# Patient Record
Sex: MALE | Race: WHITE | NOT HISPANIC OR LATINO | Employment: OTHER | URBAN - METROPOLITAN AREA
[De-identification: names, ages, dates, MRNs, and addresses within clinical notes are randomized per-mention and may not be internally consistent; named-entity substitution may affect disease eponyms.]

---

## 2023-08-12 ENCOUNTER — HOSPITAL ENCOUNTER (EMERGENCY)
Facility: HOSPITAL | Age: 68
Discharge: HOME | End: 2023-08-12
Attending: EMERGENCY MEDICINE
Payer: OTHER GOVERNMENT

## 2023-08-12 VITALS
RESPIRATION RATE: 16 BRPM | WEIGHT: 120.59 LBS | DIASTOLIC BLOOD PRESSURE: 94 MMHG | HEIGHT: 73 IN | SYSTOLIC BLOOD PRESSURE: 110 MMHG | BODY MASS INDEX: 15.98 KG/M2 | HEART RATE: 79 BPM | TEMPERATURE: 97.9 F | OXYGEN SATURATION: 95 %

## 2023-08-12 DIAGNOSIS — B00.89 HERPES DERMATITIS: Primary | ICD-10-CM

## 2023-08-12 PROCEDURE — 99282 EMERGENCY DEPT VISIT SF MDM: CPT

## 2023-08-12 RX ORDER — ACYCLOVIR 400 MG/1
400 TABLET ORAL
Qty: 50 TABLET | Refills: 0 | Status: SHIPPED | OUTPATIENT
Start: 2023-08-12

## 2023-08-12 NOTE — ED PROVIDER NOTES
Time: 10:19 AM EDT  Date of encounter:  8/12/2023  Independent Historian/Clinical History and Information was obtained by:   Patient    History is limited by: N/A    Chief Complaint: Skin rash      History of Present Illness:  Patient is a 67 y.o. year old male who presents to the emergency department for evaluation of rash to the mid back since early May.  He states initially was burning and now is itching.  He has previously reportedly been prescribed steroid cream which he states made it worse.  Is been no fever.    HPI    Patient Care Team  Primary Care Provider: Antony Florence MD    Past Medical History:     No Known Allergies  Past Medical History:   Diagnosis Date    Arthritis     COPD (chronic obstructive pulmonary disease)     Hypertension      History reviewed. No pertinent surgical history.  History reviewed. No pertinent family history.    Home Medications:  Prior to Admission medications    Medication Sig Start Date End Date Taking? Authorizing Provider   acyclovir (ZOVIRAX) 400 MG tablet Take 1 tablet by mouth 5 (Five) Times a Day. Take no more than 5 doses a day. 8/12/23   Dennis Woods MD   albuterol sulfate  (90 Base) MCG/ACT inhaler Inhale 2 puffs Every 4 (Four) Hours As Needed for Wheezing.    Provider, MD Pricila   amLODIPine (NORVASC) 5 MG tablet Take 1 tablet by mouth Daily.    ProviderPricila MD   diphenhydrAMINE (BENADRYL) 2 % cream Apply 1 application  topically to the appropriate area as directed 3 (Three) Times a Day As Needed for Itching. 8/12/23   Dennis Woods MD   gabapentin (NEURONTIN) 600 MG tablet Take 1 tablet by mouth 3 (Three) Times a Day.    ProviderPricila MD   methylPREDNISolone (MEDROL) 4 MG dose pack Take as directed on package instructions. 6/9/23   Cooksey, John Calvin, PA-C        Social History:   Social History     Tobacco Use    Smoking status: Every Day     Packs/day: 0.50     Years: 50.00     Pack years: 25.00     Types: Cigarettes     "Smokeless tobacco: Never   Vaping Use    Vaping Use: Never used   Substance Use Topics    Alcohol use: Never    Drug use: Never         Review of Systems:  Review of Systems   Constitutional:  Negative for chills and fever.   HENT:  Negative for congestion, ear pain and sore throat.    Eyes:  Negative for pain.   Respiratory:  Negative for cough, chest tightness and shortness of breath.    Cardiovascular:  Negative for chest pain.   Gastrointestinal:  Negative for abdominal pain, diarrhea, nausea and vomiting.   Genitourinary:  Negative for flank pain and hematuria.   Musculoskeletal:  Negative for joint swelling.   Skin:  Negative for pallor.   Neurological:  Negative for seizures and headaches.   All other systems reviewed and are negative.     Physical Exam:  BP (!) 130/108   Pulse 90   Temp 97.9 øF (36.6 øC) (Oral)   Resp 16   Ht 185.4 cm (73\")   Wt 54.7 kg (120 lb 9.5 oz)   SpO2 94%   BMI 15.91 kg/mý     Physical Exam  Constitutional:       Appearance: Normal appearance.   HENT:      Head: Normocephalic and atraumatic.      Nose: Nose normal.      Mouth/Throat:      Mouth: Mucous membranes are moist.   Eyes:      Extraocular Movements: Extraocular movements intact.      Conjunctiva/sclera: Conjunctivae normal.      Pupils: Pupils are equal, round, and reactive to light.   Cardiovascular:      Rate and Rhythm: Normal rate and regular rhythm.      Pulses: Normal pulses.      Heart sounds: Normal heart sounds.   Pulmonary:      Effort: Pulmonary effort is normal.      Breath sounds: Normal breath sounds. No wheezing.   Abdominal:      Palpations: Abdomen is soft.      Tenderness: There is no abdominal tenderness.   Musculoskeletal:         General: Normal range of motion.      Cervical back: Normal range of motion and neck supple.      Right lower leg: No edema.      Left lower leg: No edema.   Skin:     General: Skin is warm and dry.      Capillary Refill: Capillary refill takes less than 2 seconds.      " Findings: No rash.      Comments: To the mid thoracic region there is a 10 cm vertical by 15 cm horizontal area of rash.  This has a dark brawny discoloration with scattered raised sandpaper type lesions that could represent resolving vesicles.  There is no tenderness or erythema or fluctuance.   Neurological:      General: No focal deficit present.      Mental Status: He is alert and oriented to person, place, and time. Mental status is at baseline.      Cranial Nerves: No cranial nerve deficit.      Sensory: No sensory deficit.      Motor: No weakness.   Psychiatric:         Mood and Affect: Mood normal.         Behavior: Behavior normal.                Procedures:  Procedures      Medical Decision Making:      Comorbidities that affect care:        External Notes reviewed:          The following orders were placed and all results were independently analyzed by me:  No orders of the defined types were placed in this encounter.      Medications Given in the Emergency Department:  Medications - No data to display     ED Course:         Labs:    Lab Results (last 24 hours)       ** No results found for the last 24 hours. **             Imaging:    No Radiology Exams Resulted Within Past 24 Hours      Differential Diagnosis and Discussion:    Rash: Differential diagnosis includes but is not limited to sepsis, cellulitis, [place] Spotted Fever, meningitis, meningococcemia, Varicella, Strep infection, dermatitis, allergic reaction, Lyme disease, and toxic shock syndrome.        MDM  Number of Diagnoses or Management Options  Herpes dermatitis  Diagnosis management comments: Looking at the patient's rash and appreciating that steroids may have made it worse I am concerned that this may represent a herpetic resolving rash.  For that reason I will place him on acyclovir and he is prescribed Benadryl for the itching.  He does have a dermatology appointment 2 weeks and he strongly encouraged to keep that appointment.   I would not prescribe him steroids as he states those previously made it worse which again heightens my concern this may be herpetic in nature.  There is been no fever to suggest sepsis or other acute medical emergent issues at this time.             Patient Care Considerations:          Consultants/Shared Management Plan:    None    Social Determinants of Health:    Patient is independent, reliable, and has access to care.       Disposition and Care Coordination:    Discharged: The patient is suitable and stable for discharge with no need for consideration of observation or admission.    I have explained discharge medications and the need for follow up with the patient/caretakers. This was also printed in the discharge instructions. Patient was discharged with the following medications and follow up:      Medication List        New Prescriptions      acyclovir 400 MG tablet  Commonly known as: ZOVIRAX  Take 1 tablet by mouth 5 (Five) Times a Day. Take no more than 5 doses a day.     diphenhydrAMINE 2 % cream  Commonly known as: BENADRYL  Apply 1 application  topically to the appropriate area as directed 3 (Three) Times a Day As Needed for Itching.               Where to Get Your Medications        These medications were sent to [facility] [address] [place] [address], [address] - [phone]  - [phone]  [address] [facility] [address], [facility] [address]      Phone: [phone]   acyclovir 400 MG tablet  diphenhydrAMINE 2 % cream      Follow-up with the dermatologist as scheduled in 2 weeks.             Final diagnoses:   Herpes dermatitis        ED Disposition       ED Disposition   Discharge    Condition   Stable    Comment   --               This medical record created using voice recognition software.             Dennis Woods MD  08/12/23 [id number]

## 2023-11-27 PROCEDURE — 82948 REAGENT STRIP/BLOOD GLUCOSE: CPT

## 2023-12-25 ENCOUNTER — APPOINTMENT (OUTPATIENT)
Dept: GENERAL RADIOLOGY | Facility: HOSPITAL | Age: 68
End: 2023-12-25
Payer: OTHER GOVERNMENT

## 2023-12-25 ENCOUNTER — HOSPITAL ENCOUNTER (INPATIENT)
Facility: HOSPITAL | Age: 68
LOS: 4 days | Discharge: HOME | End: 2023-12-29
Attending: EMERGENCY MEDICINE | Admitting: FAMILY MEDICINE
Payer: OTHER GOVERNMENT

## 2023-12-25 DIAGNOSIS — R07.9 CHEST PAIN, UNSPECIFIED TYPE: Primary | ICD-10-CM

## 2023-12-25 DIAGNOSIS — I21.3 ST ELEVATION MYOCARDIAL INFARCTION (STEMI), UNSPECIFIED ARTERY: ICD-10-CM

## 2023-12-25 DIAGNOSIS — I21.21 ST ELEVATION MYOCARDIAL INFARCTION INVOLVING LEFT CIRCUMFLEX CORONARY ARTERY: ICD-10-CM

## 2023-12-25 DIAGNOSIS — E87.1 HYPONATREMIA: ICD-10-CM

## 2023-12-25 LAB
ACT BLD: 206 SECONDS (ref 89–137)
ACT BLD: 260 SECONDS (ref 89–137)
ACT BLD: 271 SECONDS (ref 89–137)
ALBUMIN SERPL-MCNC: 3.5 G/DL (ref 3.5–5.2)
ALBUMIN/GLOB SERPL: 0.9 G/DL
ALP SERPL-CCNC: 89 U/L (ref 39–117)
ALT SERPL W P-5'-P-CCNC: 10 U/L (ref 1–41)
ANION GAP SERPL CALCULATED.3IONS-SCNC: 13.3 MMOL/L (ref 5–15)
AST SERPL-CCNC: 29 U/L (ref 1–40)
BASOPHILS # BLD AUTO: 0.03 10*3/MM3 (ref 0–0.2)
BASOPHILS NFR BLD AUTO: 0.6 % (ref 0–1.5)
BILIRUB SERPL-MCNC: 0.3 MG/DL (ref 0–1.2)
BUN SERPL-MCNC: 7 MG/DL (ref 8–23)
BUN/CREAT SERPL: 11.5 (ref 7–25)
CALCIUM SPEC-SCNC: 9 MG/DL (ref 8.6–10.5)
CHLORIDE SERPL-SCNC: 89 MMOL/L (ref 98–107)
CHOLEST SERPL-MCNC: 116 MG/DL (ref 0–200)
CO2 SERPL-SCNC: 19.7 MMOL/L (ref 22–29)
CREAT SERPL-MCNC: 0.61 MG/DL (ref 0.76–1.27)
DEPRECATED RDW RBC AUTO: 44.2 FL (ref 37–54)
EGFRCR SERPLBLD CKD-EPI 2021: 105.9 ML/MIN/1.73
EOSINOPHIL # BLD AUTO: 0.2 10*3/MM3 (ref 0–0.4)
EOSINOPHIL NFR BLD AUTO: 3.9 % (ref 0.3–6.2)
ERYTHROCYTE [DISTWIDTH] IN BLOOD BY AUTOMATED COUNT: 13.2 % (ref 12.3–15.4)
GEN 5 2HR TROPONIN T REFLEX: 11 NG/L
GLOBULIN UR ELPH-MCNC: 3.7 GM/DL
GLUCOSE SERPL-MCNC: 100 MG/DL (ref 65–99)
HCT VFR BLD AUTO: 38.4 % (ref 37.5–51)
HDLC SERPL-MCNC: 32 MG/DL (ref 40–60)
HGB BLD-MCNC: 13.3 G/DL (ref 13–17.7)
HOLD SPECIMEN: NORMAL
HOLD SPECIMEN: NORMAL
IMM GRANULOCYTES # BLD AUTO: 0.01 10*3/MM3 (ref 0–0.05)
IMM GRANULOCYTES NFR BLD AUTO: 0.2 % (ref 0–0.5)
LDLC SERPL CALC-MCNC: 70 MG/DL (ref 0–100)
LDLC/HDLC SERPL: 2.2 {RATIO}
LIPASE SERPL-CCNC: 22 U/L (ref 13–60)
LYMPHOCYTES # BLD AUTO: 0.9 10*3/MM3 (ref 0.7–3.1)
LYMPHOCYTES NFR BLD AUTO: 17.4 % (ref 19.6–45.3)
MAGNESIUM SERPL-MCNC: 1.7 MG/DL (ref 1.6–2.4)
MCH RBC QN AUTO: 31.7 PG (ref 26.6–33)
MCHC RBC AUTO-ENTMCNC: 34.6 G/DL (ref 31.5–35.7)
MCV RBC AUTO: 91.4 FL (ref 79–97)
MONOCYTES # BLD AUTO: 0.85 10*3/MM3 (ref 0.1–0.9)
MONOCYTES NFR BLD AUTO: 16.4 % (ref 5–12)
NEUTROPHILS NFR BLD AUTO: 3.18 10*3/MM3 (ref 1.7–7)
NEUTROPHILS NFR BLD AUTO: 61.5 % (ref 42.7–76)
NRBC BLD AUTO-RTO: 0 /100 WBC (ref 0–0.2)
NT-PROBNP SERPL-MCNC: 285.8 PG/ML (ref 0–900)
PLATELET # BLD AUTO: 321 10*3/MM3 (ref 140–450)
PMV BLD AUTO: 8.2 FL (ref 6–12)
POTASSIUM SERPL-SCNC: 4.3 MMOL/L (ref 3.5–5.2)
PROCALCITONIN SERPL-MCNC: 2.44 NG/ML (ref 0–0.25)
PROT SERPL-MCNC: 7.2 G/DL (ref 6–8.5)
RBC # BLD AUTO: 4.2 10*6/MM3 (ref 4.14–5.8)
SODIUM SERPL-SCNC: 122 MMOL/L (ref 136–145)
TRIGL SERPL-MCNC: 68 MG/DL (ref 0–150)
TROPONIN T DELTA: 0 NG/L
TROPONIN T SERPL HS-MCNC: 11 NG/L
VLDLC SERPL-MCNC: 14 MG/DL (ref 5–40)
WBC NRBC COR # BLD AUTO: 5.17 10*3/MM3 (ref 3.4–10.8)
WHOLE BLOOD HOLD COAG: NORMAL
WHOLE BLOOD HOLD SPECIMEN: NORMAL

## 2023-12-25 PROCEDURE — 80061 LIPID PANEL: CPT | Performed by: INTERNAL MEDICINE

## 2023-12-25 PROCEDURE — 92978 ENDOLUMINL IVUS OCT C 1ST: CPT | Performed by: INTERNAL MEDICINE

## 2023-12-25 PROCEDURE — 25010000002 MORPHINE PER 10 MG: Performed by: EMERGENCY MEDICINE

## 2023-12-25 PROCEDURE — 92979 ENDOLUMINL IVUS OCT C EA: CPT | Performed by: INTERNAL MEDICINE

## 2023-12-25 PROCEDURE — 71045 X-RAY EXAM CHEST 1 VIEW: CPT

## 2023-12-25 PROCEDURE — 027034Z DILATION OF CORONARY ARTERY, ONE ARTERY WITH DRUG-ELUTING INTRALUMINAL DEVICE, PERCUTANEOUS APPROACH: ICD-10-PCS | Performed by: INTERNAL MEDICINE

## 2023-12-25 PROCEDURE — C1725 CATH, TRANSLUMIN NON-LASER: HCPCS | Performed by: INTERNAL MEDICINE

## 2023-12-25 PROCEDURE — 99291 CRITICAL CARE FIRST HOUR: CPT

## 2023-12-25 PROCEDURE — 99152 MOD SED SAME PHYS/QHP 5/>YRS: CPT | Performed by: INTERNAL MEDICINE

## 2023-12-25 PROCEDURE — 4A023N7 MEASUREMENT OF CARDIAC SAMPLING AND PRESSURE, LEFT HEART, PERCUTANEOUS APPROACH: ICD-10-PCS | Performed by: INTERNAL MEDICINE

## 2023-12-25 PROCEDURE — 83036 HEMOGLOBIN GLYCOSYLATED A1C: CPT | Performed by: INTERNAL MEDICINE

## 2023-12-25 PROCEDURE — 80053 COMPREHEN METABOLIC PANEL: CPT | Performed by: EMERGENCY MEDICINE

## 2023-12-25 PROCEDURE — 25510000001 IOPAMIDOL PER 1 ML: Performed by: INTERNAL MEDICINE

## 2023-12-25 PROCEDURE — 25010000002 HEPARIN (PORCINE) PER 1000 UNITS: Performed by: INTERNAL MEDICINE

## 2023-12-25 PROCEDURE — 25810000003 SODIUM CHLORIDE 0.9 % SOLUTION: Performed by: EMERGENCY MEDICINE

## 2023-12-25 PROCEDURE — C1753 CATH, INTRAVAS ULTRASOUND: HCPCS | Performed by: INTERNAL MEDICINE

## 2023-12-25 PROCEDURE — 25010000002 MIDAZOLAM PER 1MG: Performed by: INTERNAL MEDICINE

## 2023-12-25 PROCEDURE — 85347 COAGULATION TIME ACTIVATED: CPT

## 2023-12-25 PROCEDURE — B2111ZZ FLUOROSCOPY OF MULTIPLE CORONARY ARTERIES USING LOW OSMOLAR CONTRAST: ICD-10-PCS | Performed by: INTERNAL MEDICINE

## 2023-12-25 PROCEDURE — 83930 ASSAY OF BLOOD OSMOLALITY: CPT | Performed by: STUDENT IN AN ORGANIZED HEALTH CARE EDUCATION/TRAINING PROGRAM

## 2023-12-25 PROCEDURE — 99153 MOD SED SAME PHYS/QHP EA: CPT | Performed by: INTERNAL MEDICINE

## 2023-12-25 PROCEDURE — 84484 ASSAY OF TROPONIN QUANT: CPT | Performed by: EMERGENCY MEDICINE

## 2023-12-25 PROCEDURE — 93005 ELECTROCARDIOGRAM TRACING: CPT | Performed by: EMERGENCY MEDICINE

## 2023-12-25 PROCEDURE — C1887 CATHETER, GUIDING: HCPCS | Performed by: INTERNAL MEDICINE

## 2023-12-25 PROCEDURE — 93458 L HRT ARTERY/VENTRICLE ANGIO: CPT | Performed by: INTERNAL MEDICINE

## 2023-12-25 PROCEDURE — C1874 STENT, COATED/COV W/DEL SYS: HCPCS | Performed by: INTERNAL MEDICINE

## 2023-12-25 PROCEDURE — C1769 GUIDE WIRE: HCPCS | Performed by: INTERNAL MEDICINE

## 2023-12-25 PROCEDURE — C9606 PERC D-E COR REVASC W AMI S: HCPCS | Performed by: INTERNAL MEDICINE

## 2023-12-25 PROCEDURE — 83735 ASSAY OF MAGNESIUM: CPT | Performed by: EMERGENCY MEDICINE

## 2023-12-25 PROCEDURE — 83690 ASSAY OF LIPASE: CPT | Performed by: EMERGENCY MEDICINE

## 2023-12-25 PROCEDURE — 84484 ASSAY OF TROPONIN QUANT: CPT | Performed by: INTERNAL MEDICINE

## 2023-12-25 PROCEDURE — 25010000002 FENTANYL CITRATE (PF) 50 MCG/ML SOLUTION: Performed by: INTERNAL MEDICINE

## 2023-12-25 PROCEDURE — C1894 INTRO/SHEATH, NON-LASER: HCPCS | Performed by: INTERNAL MEDICINE

## 2023-12-25 PROCEDURE — 83880 ASSAY OF NATRIURETIC PEPTIDE: CPT | Performed by: EMERGENCY MEDICINE

## 2023-12-25 PROCEDURE — 25010000002 ONDANSETRON PER 1 MG: Performed by: EMERGENCY MEDICINE

## 2023-12-25 PROCEDURE — C9600 PERC DRUG-EL COR STENT SING: HCPCS | Performed by: INTERNAL MEDICINE

## 2023-12-25 PROCEDURE — B240ZZ3 ULTRASONOGRAPHY OF SINGLE CORONARY ARTERY, INTRAVASCULAR: ICD-10-PCS | Performed by: INTERNAL MEDICINE

## 2023-12-25 PROCEDURE — 93005 ELECTROCARDIOGRAM TRACING: CPT

## 2023-12-25 PROCEDURE — 85025 COMPLETE CBC W/AUTO DIFF WBC: CPT | Performed by: EMERGENCY MEDICINE

## 2023-12-25 PROCEDURE — 99223 1ST HOSP IP/OBS HIGH 75: CPT | Performed by: STUDENT IN AN ORGANIZED HEALTH CARE EDUCATION/TRAINING PROGRAM

## 2023-12-25 PROCEDURE — 84145 PROCALCITONIN (PCT): CPT | Performed by: STUDENT IN AN ORGANIZED HEALTH CARE EDUCATION/TRAINING PROGRAM

## 2023-12-25 PROCEDURE — 99255 IP/OBS CONSLTJ NEW/EST HI 80: CPT | Performed by: INTERNAL MEDICINE

## 2023-12-25 DEVICE — XIENCE SKYPOINT™ EVEROLIMUS ELUTING CORONARY STENT SYSTEM 3.00 MM X 15 MM / RAPID-EXCHANGE
Type: IMPLANTABLE DEVICE | Status: FUNCTIONAL
Brand: XIENCE SKYPOINT™

## 2023-12-25 DEVICE — XIENCE SKYPOINT™ EVEROLIMUS ELUTING CORONARY STENT SYSTEM 3.25 MM X 33 MM / RAPID-EXCHANGE
Type: IMPLANTABLE DEVICE | Status: FUNCTIONAL
Brand: XIENCE SKYPOINT™

## 2023-12-25 RX ORDER — FENTANYL CITRATE 50 UG/ML
INJECTION, SOLUTION INTRAMUSCULAR; INTRAVENOUS
Status: DISCONTINUED | OUTPATIENT
Start: 2023-12-25 | End: 2023-12-25 | Stop reason: HOSPADM

## 2023-12-25 RX ORDER — ASPIRIN 81 MG/1
324 TABLET, CHEWABLE ORAL ONCE
Status: DISCONTINUED | OUTPATIENT
Start: 2023-12-25 | End: 2023-12-25

## 2023-12-25 RX ORDER — SODIUM CHLORIDE 0.9 % (FLUSH) 0.9 %
10 SYRINGE (ML) INJECTION AS NEEDED
Status: DISCONTINUED | OUTPATIENT
Start: 2023-12-25 | End: 2023-12-29 | Stop reason: HOSPADM

## 2023-12-25 RX ORDER — HYDROCODONE BITARTRATE AND ACETAMINOPHEN 5; 325 MG/1; MG/1
1 TABLET ORAL EVERY 4 HOURS PRN
Status: DISCONTINUED | OUTPATIENT
Start: 2023-12-25 | End: 2023-12-29 | Stop reason: HOSPADM

## 2023-12-25 RX ORDER — ONDANSETRON 2 MG/ML
4 INJECTION INTRAMUSCULAR; INTRAVENOUS EVERY 6 HOURS PRN
Status: DISCONTINUED | OUTPATIENT
Start: 2023-12-25 | End: 2023-12-29 | Stop reason: HOSPADM

## 2023-12-25 RX ORDER — MIDAZOLAM HYDROCHLORIDE 2 MG/2ML
INJECTION, SOLUTION INTRAMUSCULAR; INTRAVENOUS
Status: DISCONTINUED | OUTPATIENT
Start: 2023-12-25 | End: 2023-12-25 | Stop reason: HOSPADM

## 2023-12-25 RX ORDER — ATORVASTATIN CALCIUM 40 MG/1
40 TABLET, FILM COATED ORAL NIGHTLY
Status: DISCONTINUED | OUTPATIENT
Start: 2023-12-25 | End: 2023-12-29 | Stop reason: HOSPADM

## 2023-12-25 RX ORDER — ONDANSETRON 2 MG/ML
4 INJECTION INTRAMUSCULAR; INTRAVENOUS ONCE
Status: COMPLETED | OUTPATIENT
Start: 2023-12-25 | End: 2023-12-25

## 2023-12-25 RX ORDER — LISINOPRIL 5 MG/1
5 TABLET ORAL DAILY
Status: ON HOLD | COMMUNITY
End: 2023-12-28 | Stop reason: SDUPTHER

## 2023-12-25 RX ORDER — IPRATROPIUM BROMIDE AND ALBUTEROL SULFATE 2.5; .5 MG/3ML; MG/3ML
3 SOLUTION RESPIRATORY (INHALATION)
Status: DISCONTINUED | OUTPATIENT
Start: 2023-12-26 | End: 2023-12-26

## 2023-12-25 RX ORDER — METOPROLOL SUCCINATE 25 MG/1
25 TABLET, EXTENDED RELEASE ORAL
Status: DISCONTINUED | OUTPATIENT
Start: 2023-12-26 | End: 2023-12-29 | Stop reason: HOSPADM

## 2023-12-25 RX ORDER — ASPIRIN 81 MG/1
81 TABLET ORAL DAILY
Status: DISCONTINUED | OUTPATIENT
Start: 2023-12-26 | End: 2023-12-29 | Stop reason: HOSPADM

## 2023-12-25 RX ORDER — NITROGLYCERIN 0.4 MG/1
0.4 TABLET SUBLINGUAL
Status: DISCONTINUED | OUTPATIENT
Start: 2023-12-25 | End: 2023-12-29 | Stop reason: HOSPADM

## 2023-12-25 RX ORDER — AMLODIPINE BESYLATE 5 MG/1
10 TABLET ORAL DAILY
Status: DISCONTINUED | OUTPATIENT
Start: 2023-12-26 | End: 2023-12-29 | Stop reason: HOSPADM

## 2023-12-25 RX ORDER — SODIUM CHLORIDE 9 MG/ML
500 INJECTION, SOLUTION INTRAVENOUS ONCE
Status: COMPLETED | OUTPATIENT
Start: 2023-12-26 | End: 2023-12-26

## 2023-12-25 RX ORDER — ACETAMINOPHEN 325 MG/1
650 TABLET ORAL EVERY 4 HOURS PRN
Status: DISCONTINUED | OUTPATIENT
Start: 2023-12-25 | End: 2023-12-29 | Stop reason: HOSPADM

## 2023-12-25 RX ORDER — LIDOCAINE HYDROCHLORIDE 20 MG/ML
INJECTION, SOLUTION INFILTRATION; PERINEURAL
Status: DISCONTINUED | OUTPATIENT
Start: 2023-12-25 | End: 2023-12-25 | Stop reason: HOSPADM

## 2023-12-25 RX ORDER — HEPARIN SODIUM 1000 [USP'U]/ML
INJECTION, SOLUTION INTRAVENOUS; SUBCUTANEOUS
Status: DISCONTINUED | OUTPATIENT
Start: 2023-12-25 | End: 2023-12-25 | Stop reason: HOSPADM

## 2023-12-25 RX ORDER — ONDANSETRON 4 MG/1
4 TABLET, ORALLY DISINTEGRATING ORAL EVERY 6 HOURS PRN
Status: DISCONTINUED | OUTPATIENT
Start: 2023-12-25 | End: 2023-12-29 | Stop reason: HOSPADM

## 2023-12-25 RX ORDER — CEFTRIAXONE SODIUM 1 G/50ML
1000 INJECTION, SOLUTION INTRAVENOUS EVERY 24 HOURS
Status: DISCONTINUED | OUTPATIENT
Start: 2023-12-26 | End: 2023-12-27

## 2023-12-25 RX ORDER — SODIUM CHLORIDE 9 MG/ML
250 INJECTION, SOLUTION INTRAVENOUS ONCE AS NEEDED
Status: DISCONTINUED | OUTPATIENT
Start: 2023-12-25 | End: 2023-12-29 | Stop reason: HOSPADM

## 2023-12-25 RX ORDER — DIPHENHYDRAMINE HCL 25 MG
25 CAPSULE ORAL EVERY 6 HOURS PRN
Status: DISCONTINUED | OUTPATIENT
Start: 2023-12-25 | End: 2023-12-29 | Stop reason: HOSPADM

## 2023-12-25 RX ORDER — MORPHINE SULFATE 2 MG/ML
2 INJECTION, SOLUTION INTRAMUSCULAR; INTRAVENOUS ONCE
Status: COMPLETED | OUTPATIENT
Start: 2023-12-25 | End: 2023-12-25

## 2023-12-25 RX ADMIN — MORPHINE SULFATE 2 MG: 2 INJECTION, SOLUTION INTRAMUSCULAR; INTRAVENOUS at 19:42

## 2023-12-25 RX ADMIN — ATORVASTATIN CALCIUM 40 MG: 40 TABLET, FILM COATED ORAL at 22:04

## 2023-12-25 RX ADMIN — HYDROCODONE BITARTRATE AND ACETAMINOPHEN 1 TABLET: 5; 325 TABLET ORAL at 22:08

## 2023-12-25 RX ADMIN — AMLODIPINE BESYLATE 10 MG: 10 TABLET ORAL at 23:51

## 2023-12-25 RX ADMIN — NITROGLYCERIN 0.4 MG: 0.4 TABLET SUBLINGUAL at 19:44

## 2023-12-25 RX ADMIN — ONDANSETRON 4 MG: 2 INJECTION INTRAMUSCULAR; INTRAVENOUS at 19:41

## 2023-12-25 RX ADMIN — SODIUM CHLORIDE 500 ML: 9 INJECTION, SOLUTION INTRAVENOUS at 19:40

## 2023-12-25 NOTE — Clinical Note
First balloon inflation max pressure = 14 lisa. First balloon inflation duration = 10 seconds. Second inflation of balloon - Max pressure = 10 lisa. 2nd Inflation of balloon - Duration = 10 seconds.

## 2023-12-25 NOTE — Clinical Note
First balloon inflation max pressure = 12 lisa. First balloon inflation duration = 10 seconds. Second inflation of balloon - Max pressure = 14 lisa. 2nd Inflation of balloon - Duration = 10 seconds. Third inflation of balloon - Max pressure = 12 lisa. 3rd Inflation of balloon - Duration = 10 seconds.

## 2023-12-25 NOTE — Clinical Note
First balloon inflation max pressure = 12 lisa. First balloon inflation duration = 10 seconds. Second inflation of balloon - Max pressure = 4 lisa. 2nd Inflation of balloon - Duration = 10 seconds.

## 2023-12-25 NOTE — Clinical Note
First balloon inflation max pressure = 8 lisa. First balloon inflation duration = 10 seconds. Second inflation of balloon - Max pressure = 8 lisa. 2nd Inflation of balloon - Duration = 10 seconds. Third inflation of balloon - Max pressure = 8 lisa. 3rd Inflation of balloon - Duration = 10 seconds.

## 2023-12-26 ENCOUNTER — APPOINTMENT (OUTPATIENT)
Dept: CARDIOLOGY | Facility: HOSPITAL | Age: 68
End: 2023-12-26
Payer: OTHER GOVERNMENT

## 2023-12-26 ENCOUNTER — APPOINTMENT (OUTPATIENT)
Dept: CT IMAGING | Facility: HOSPITAL | Age: 68
End: 2023-12-26
Payer: OTHER GOVERNMENT

## 2023-12-26 PROBLEM — E43 SEVERE MALNUTRITION: Status: ACTIVE | Noted: 2023-12-26

## 2023-12-26 LAB
ACT BLD: 141 SECONDS (ref 89–137)
ACT BLD: 152 SECONDS (ref 89–137)
ACT BLD: 158 SECONDS (ref 89–137)
ACT BLD: 158 SECONDS (ref 89–137)
ACT BLD: 185 SECONDS (ref 89–137)
ALBUMIN SERPL-MCNC: 3.5 G/DL (ref 3.5–5.2)
ALBUMIN/GLOB SERPL: 1 G/DL
ALP SERPL-CCNC: 88 U/L (ref 39–117)
ALT SERPL W P-5'-P-CCNC: 10 U/L (ref 1–41)
ANION GAP SERPL CALCULATED.3IONS-SCNC: 10.4 MMOL/L (ref 5–15)
ANION GAP SERPL CALCULATED.3IONS-SCNC: 12.2 MMOL/L (ref 5–15)
AST SERPL-CCNC: 26 U/L (ref 1–40)
BACTERIA BLD CULT: ABNORMAL
BACTERIA SPEC RESP CULT: NORMAL
BASOPHILS # BLD AUTO: 0.02 10*3/MM3 (ref 0–0.2)
BASOPHILS NFR BLD AUTO: 0.4 % (ref 0–1.5)
BH CV ECHO MEAS - AO ROOT DIAM: 3.2 CM
BH CV ECHO MEAS - EDV(CUBED): 91.1 ML
BH CV ECHO MEAS - EDV(MOD-SP2): 79.3 ML
BH CV ECHO MEAS - EDV(MOD-SP4): 65.8 ML
BH CV ECHO MEAS - EF(MOD-BP): 59.4 %
BH CV ECHO MEAS - EF(MOD-SP2): 63.3 %
BH CV ECHO MEAS - EF(MOD-SP4): 57.3 %
BH CV ECHO MEAS - ESV(CUBED): 19.9 ML
BH CV ECHO MEAS - ESV(MOD-SP2): 29.1 ML
BH CV ECHO MEAS - ESV(MOD-SP4): 28.1 ML
BH CV ECHO MEAS - FS: 39.8 %
BH CV ECHO MEAS - IVS/LVPW: 1.04 CM
BH CV ECHO MEAS - IVSD: 1.07 CM
BH CV ECHO MEAS - LA DIMENSION: 2.7 CM
BH CV ECHO MEAS - LAT PEAK E' VEL: 4.9 CM/SEC
BH CV ECHO MEAS - LV MASS(C)D: 164 GRAMS
BH CV ECHO MEAS - LVIDD: 4.5 CM
BH CV ECHO MEAS - LVIDS: 2.7 CM
BH CV ECHO MEAS - LVOT AREA: 3.1 CM2
BH CV ECHO MEAS - LVOT DIAM: 2 CM
BH CV ECHO MEAS - LVPWD: 1.03 CM
BH CV ECHO MEAS - MED PEAK E' VEL: 7 CM/SEC
BH CV ECHO MEAS - MV A MAX VEL: 64.9 CM/SEC
BH CV ECHO MEAS - MV DEC SLOPE: 509 CM/SEC2
BH CV ECHO MEAS - MV DEC TIME: 0.14 SEC
BH CV ECHO MEAS - MV E MAX VEL: 73.5 CM/SEC
BH CV ECHO MEAS - MV E/A: 1.13
BH CV ECHO MEAS - SV(MOD-SP2): 50.2 ML
BH CV ECHO MEAS - SV(MOD-SP4): 37.7 ML
BH CV ECHO MEAS - TAPSE (>1.6): 2.7 CM
BH CV ECHO MEAS - TR MAX PG: 42.8 MMHG
BH CV ECHO MEAS - TR MAX VEL: 327 CM/SEC
BH CV ECHO MEASUREMENTS AVERAGE E/E' RATIO: 12.35
BILIRUB SERPL-MCNC: 0.4 MG/DL (ref 0–1.2)
BOTTLE TYPE: ABNORMAL
BUN SERPL-MCNC: 8 MG/DL (ref 8–23)
BUN SERPL-MCNC: 8 MG/DL (ref 8–23)
BUN/CREAT SERPL: 15.1 (ref 7–25)
BUN/CREAT SERPL: 15.4 (ref 7–25)
CALCIUM SPEC-SCNC: 8.6 MG/DL (ref 8.6–10.5)
CALCIUM SPEC-SCNC: 8.8 MG/DL (ref 8.6–10.5)
CHLORIDE SERPL-SCNC: 89 MMOL/L (ref 98–107)
CHLORIDE SERPL-SCNC: 89 MMOL/L (ref 98–107)
CO2 SERPL-SCNC: 18.8 MMOL/L (ref 22–29)
CO2 SERPL-SCNC: 20.6 MMOL/L (ref 22–29)
CREAT SERPL-MCNC: 0.52 MG/DL (ref 0.76–1.27)
CREAT SERPL-MCNC: 0.53 MG/DL (ref 0.76–1.27)
CREAT UR-MCNC: 54 MG/DL
D-LACTATE SERPL-SCNC: 1 MMOL/L (ref 0.5–2)
DEPRECATED RDW RBC AUTO: 44.3 FL (ref 37–54)
EGFRCR SERPLBLD CKD-EPI 2021: 110.5 ML/MIN/1.73
EGFRCR SERPLBLD CKD-EPI 2021: 111.2 ML/MIN/1.73
EOSINOPHIL # BLD AUTO: 0.06 10*3/MM3 (ref 0–0.4)
EOSINOPHIL NFR BLD AUTO: 1.3 % (ref 0.3–6.2)
ERYTHROCYTE [DISTWIDTH] IN BLOOD BY AUTOMATED COUNT: 13.1 % (ref 12.3–15.4)
GLOBULIN UR ELPH-MCNC: 3.6 GM/DL
GLUCOSE SERPL-MCNC: 102 MG/DL (ref 65–99)
GLUCOSE SERPL-MCNC: 93 MG/DL (ref 65–99)
GRAM STN SPEC: NORMAL
HBA1C MFR BLD: 5.4 % (ref 4.8–5.6)
HCT VFR BLD AUTO: 39.7 % (ref 37.5–51)
HGB BLD-MCNC: 13.8 G/DL (ref 13–17.7)
HOLD SPECIMEN: NORMAL
IMM GRANULOCYTES # BLD AUTO: 0.03 10*3/MM3 (ref 0–0.05)
IMM GRANULOCYTES NFR BLD AUTO: 0.7 % (ref 0–0.5)
IVRT: 50 MS
L PNEUMO1 AG UR QL IA: NEGATIVE
LEFT ATRIUM VOLUME INDEX: 8.9 ML/M2
LYMPHOCYTES # BLD AUTO: 0.73 10*3/MM3 (ref 0.7–3.1)
LYMPHOCYTES NFR BLD AUTO: 16.1 % (ref 19.6–45.3)
MAGNESIUM SERPL-MCNC: 1.7 MG/DL (ref 1.6–2.4)
MCH RBC QN AUTO: 31.8 PG (ref 26.6–33)
MCHC RBC AUTO-ENTMCNC: 34.8 G/DL (ref 31.5–35.7)
MCV RBC AUTO: 91.5 FL (ref 79–97)
MONOCYTES # BLD AUTO: 0.65 10*3/MM3 (ref 0.1–0.9)
MONOCYTES NFR BLD AUTO: 14.3 % (ref 5–12)
MRSA DNA SPEC QL NAA+PROBE: NORMAL
NEUTROPHILS NFR BLD AUTO: 3.04 10*3/MM3 (ref 1.7–7)
NEUTROPHILS NFR BLD AUTO: 67.2 % (ref 42.7–76)
NRBC BLD AUTO-RTO: 0 /100 WBC (ref 0–0.2)
OSMOLALITY SERPL: 253 MOSM/KG (ref 280–301)
OSMOLALITY UR: 565 MOSM/KG
PHOSPHATE SERPL-MCNC: 3.6 MG/DL (ref 2.5–4.5)
PLATELET # BLD AUTO: 311 10*3/MM3 (ref 140–450)
PMV BLD AUTO: 8 FL (ref 6–12)
POTASSIUM SERPL-SCNC: 3.9 MMOL/L (ref 3.5–5.2)
POTASSIUM SERPL-SCNC: 4.2 MMOL/L (ref 3.5–5.2)
PROT SERPL-MCNC: 7.1 G/DL (ref 6–8.5)
RBC # BLD AUTO: 4.34 10*6/MM3 (ref 4.14–5.8)
S PNEUM AG SPEC QL LA: NEGATIVE
SODIUM SERPL-SCNC: 120 MMOL/L (ref 136–145)
SODIUM SERPL-SCNC: 120 MMOL/L (ref 136–145)
SODIUM UR-SCNC: <20 MMOL/L
WBC NRBC COR # BLD AUTO: 4.53 10*3/MM3 (ref 3.4–10.8)
WHOLE BLOOD HOLD SPECIMEN: NORMAL

## 2023-12-26 PROCEDURE — 87449 NOS EACH ORGANISM AG IA: CPT | Performed by: STUDENT IN AN ORGANIZED HEALTH CARE EDUCATION/TRAINING PROGRAM

## 2023-12-26 PROCEDURE — 84300 ASSAY OF URINE SODIUM: CPT | Performed by: STUDENT IN AN ORGANIZED HEALTH CARE EDUCATION/TRAINING PROGRAM

## 2023-12-26 PROCEDURE — 85347 COAGULATION TIME ACTIVATED: CPT

## 2023-12-26 PROCEDURE — 93306 TTE W/DOPPLER COMPLETE: CPT | Performed by: INTERNAL MEDICINE

## 2023-12-26 PROCEDURE — 25810000003 SODIUM CHLORIDE 0.9 % SOLUTION: Performed by: STUDENT IN AN ORGANIZED HEALTH CARE EDUCATION/TRAINING PROGRAM

## 2023-12-26 PROCEDURE — 87150 DNA/RNA AMPLIFIED PROBE: CPT | Performed by: STUDENT IN AN ORGANIZED HEALTH CARE EDUCATION/TRAINING PROGRAM

## 2023-12-26 PROCEDURE — 87641 MR-STAPH DNA AMP PROBE: CPT | Performed by: STUDENT IN AN ORGANIZED HEALTH CARE EDUCATION/TRAINING PROGRAM

## 2023-12-26 PROCEDURE — 25010000002 ONDANSETRON PER 1 MG: Performed by: INTERNAL MEDICINE

## 2023-12-26 PROCEDURE — 99233 SBSQ HOSP IP/OBS HIGH 50: CPT | Performed by: FAMILY MEDICINE

## 2023-12-26 PROCEDURE — 93306 TTE W/DOPPLER COMPLETE: CPT

## 2023-12-26 PROCEDURE — 83935 ASSAY OF URINE OSMOLALITY: CPT | Performed by: STUDENT IN AN ORGANIZED HEALTH CARE EDUCATION/TRAINING PROGRAM

## 2023-12-26 PROCEDURE — 87205 SMEAR GRAM STAIN: CPT | Performed by: STUDENT IN AN ORGANIZED HEALTH CARE EDUCATION/TRAINING PROGRAM

## 2023-12-26 PROCEDURE — 83605 ASSAY OF LACTIC ACID: CPT | Performed by: STUDENT IN AN ORGANIZED HEALTH CARE EDUCATION/TRAINING PROGRAM

## 2023-12-26 PROCEDURE — 94761 N-INVAS EAR/PLS OXIMETRY MLT: CPT

## 2023-12-26 PROCEDURE — 84100 ASSAY OF PHOSPHORUS: CPT | Performed by: STUDENT IN AN ORGANIZED HEALTH CARE EDUCATION/TRAINING PROGRAM

## 2023-12-26 PROCEDURE — 82570 ASSAY OF URINE CREATININE: CPT | Performed by: STUDENT IN AN ORGANIZED HEALTH CARE EDUCATION/TRAINING PROGRAM

## 2023-12-26 PROCEDURE — 99223 1ST HOSP IP/OBS HIGH 75: CPT | Performed by: INTERNAL MEDICINE

## 2023-12-26 PROCEDURE — 85025 COMPLETE CBC W/AUTO DIFF WBC: CPT | Performed by: STUDENT IN AN ORGANIZED HEALTH CARE EDUCATION/TRAINING PROGRAM

## 2023-12-26 PROCEDURE — 25010000002 CEFTRIAXONE PER 250 MG: Performed by: STUDENT IN AN ORGANIZED HEALTH CARE EDUCATION/TRAINING PROGRAM

## 2023-12-26 PROCEDURE — 25810000003 SODIUM CHLORIDE 0.9 % SOLUTION: Performed by: INTERNAL MEDICINE

## 2023-12-26 PROCEDURE — 80053 COMPREHEN METABOLIC PANEL: CPT | Performed by: STUDENT IN AN ORGANIZED HEALTH CARE EDUCATION/TRAINING PROGRAM

## 2023-12-26 PROCEDURE — 94640 AIRWAY INHALATION TREATMENT: CPT

## 2023-12-26 PROCEDURE — 71250 CT THORAX DX C-: CPT

## 2023-12-26 PROCEDURE — 25010000002 AZITHROMYCIN PER 500 MG: Performed by: STUDENT IN AN ORGANIZED HEALTH CARE EDUCATION/TRAINING PROGRAM

## 2023-12-26 PROCEDURE — 94664 DEMO&/EVAL PT USE INHALER: CPT

## 2023-12-26 PROCEDURE — 87040 BLOOD CULTURE FOR BACTERIA: CPT | Performed by: FAMILY MEDICINE

## 2023-12-26 PROCEDURE — 94799 UNLISTED PULMONARY SVC/PX: CPT

## 2023-12-26 PROCEDURE — 25810000003 SODIUM CHLORIDE 0.9 % SOLUTION: Performed by: FAMILY MEDICINE

## 2023-12-26 PROCEDURE — 87040 BLOOD CULTURE FOR BACTERIA: CPT | Performed by: STUDENT IN AN ORGANIZED HEALTH CARE EDUCATION/TRAINING PROGRAM

## 2023-12-26 PROCEDURE — 83735 ASSAY OF MAGNESIUM: CPT | Performed by: STUDENT IN AN ORGANIZED HEALTH CARE EDUCATION/TRAINING PROGRAM

## 2023-12-26 PROCEDURE — 87899 AGENT NOS ASSAY W/OPTIC: CPT | Performed by: STUDENT IN AN ORGANIZED HEALTH CARE EDUCATION/TRAINING PROGRAM

## 2023-12-26 PROCEDURE — 93005 ELECTROCARDIOGRAM TRACING: CPT | Performed by: INTERNAL MEDICINE

## 2023-12-26 RX ORDER — IBUPROFEN 200 MG
600 TABLET ORAL EVERY 6 HOURS PRN
COMMUNITY
End: 2023-12-29 | Stop reason: HOSPADM

## 2023-12-26 RX ORDER — NICOTINE 21 MG/24HR
1 PATCH, TRANSDERMAL 24 HOURS TRANSDERMAL DAILY PRN
Status: DISCONTINUED | OUTPATIENT
Start: 2023-12-26 | End: 2023-12-29 | Stop reason: HOSPADM

## 2023-12-26 RX ORDER — LISINOPRIL 10 MG/1
10 TABLET ORAL
Status: DISCONTINUED | OUTPATIENT
Start: 2023-12-26 | End: 2023-12-29 | Stop reason: HOSPADM

## 2023-12-26 RX ORDER — GABAPENTIN 300 MG/1
600 CAPSULE ORAL EVERY 8 HOURS SCHEDULED
Status: DISCONTINUED | OUTPATIENT
Start: 2023-12-26 | End: 2023-12-29 | Stop reason: HOSPADM

## 2023-12-26 RX ORDER — HYDROCODONE BITARTRATE AND ACETAMINOPHEN 10; 325 MG/1; MG/1
1 TABLET ORAL EVERY 8 HOURS PRN
COMMUNITY

## 2023-12-26 RX ORDER — MELATONIN
2000 DAILY
COMMUNITY

## 2023-12-26 RX ORDER — SODIUM CHLORIDE 9 MG/ML
50 INJECTION, SOLUTION INTRAVENOUS CONTINUOUS
Status: DISCONTINUED | OUTPATIENT
Start: 2023-12-26 | End: 2023-12-27

## 2023-12-26 RX ORDER — IPRATROPIUM BROMIDE AND ALBUTEROL SULFATE 2.5; .5 MG/3ML; MG/3ML
3 SOLUTION RESPIRATORY (INHALATION) EVERY 4 HOURS PRN
Status: DISCONTINUED | OUTPATIENT
Start: 2023-12-26 | End: 2023-12-29 | Stop reason: HOSPADM

## 2023-12-26 RX ADMIN — CEFTRIAXONE SODIUM 1000 MG: 1 INJECTION, SOLUTION INTRAVENOUS at 01:45

## 2023-12-26 RX ADMIN — ASPIRIN 81 MG: 81 TABLET, COATED ORAL at 09:21

## 2023-12-26 RX ADMIN — SODIUM CHLORIDE 500 ML/HR: 9 INJECTION, SOLUTION INTRAVENOUS at 00:25

## 2023-12-26 RX ADMIN — HYDROCODONE BITARTRATE AND ACETAMINOPHEN 1 TABLET: 5; 325 TABLET ORAL at 10:40

## 2023-12-26 RX ADMIN — SODIUM CHLORIDE 50 ML/HR: 9 INJECTION, SOLUTION INTRAVENOUS at 21:06

## 2023-12-26 RX ADMIN — ONDANSETRON 4 MG: 2 INJECTION INTRAMUSCULAR; INTRAVENOUS at 04:45

## 2023-12-26 RX ADMIN — HYDROCODONE BITARTRATE AND ACETAMINOPHEN 1 TABLET: 5; 325 TABLET ORAL at 06:01

## 2023-12-26 RX ADMIN — IPRATROPIUM BROMIDE AND ALBUTEROL SULFATE 3 ML: .5; 3 SOLUTION RESPIRATORY (INHALATION) at 12:49

## 2023-12-26 RX ADMIN — TICAGRELOR 90 MG: 90 TABLET ORAL at 09:20

## 2023-12-26 RX ADMIN — ATORVASTATIN CALCIUM 40 MG: 40 TABLET, FILM COATED ORAL at 20:49

## 2023-12-26 RX ADMIN — AZITHROMYCIN 500 MG: 500 INJECTION, POWDER, LYOPHILIZED, FOR SOLUTION INTRAVENOUS at 02:19

## 2023-12-26 RX ADMIN — METOPROLOL SUCCINATE 25 MG: 25 TABLET, EXTENDED RELEASE ORAL at 09:21

## 2023-12-26 RX ADMIN — TICAGRELOR 90 MG: 90 TABLET ORAL at 20:56

## 2023-12-26 RX ADMIN — Medication 10 ML: at 20:49

## 2023-12-26 RX ADMIN — IPRATROPIUM BROMIDE AND ALBUTEROL SULFATE 3 ML: .5; 3 SOLUTION RESPIRATORY (INHALATION) at 08:05

## 2023-12-26 RX ADMIN — IPRATROPIUM BROMIDE AND ALBUTEROL SULFATE 3 ML: .5; 3 SOLUTION RESPIRATORY (INHALATION) at 19:30

## 2023-12-26 RX ADMIN — GABAPENTIN 600 MG: 300 CAPSULE ORAL at 15:43

## 2023-12-26 RX ADMIN — HYDROCODONE BITARTRATE AND ACETAMINOPHEN 1 TABLET: 5; 325 TABLET ORAL at 18:59

## 2023-12-26 RX ADMIN — GABAPENTIN 600 MG: 300 CAPSULE ORAL at 21:05

## 2023-12-26 RX ADMIN — LISINOPRIL 10 MG: 10 TABLET ORAL at 02:19

## 2023-12-26 NOTE — PLAN OF CARE
Goal Outcome Evaluation:      Patient alert and oriented x4. CT chest completed today. Transfer orders received. Patient medicated for back pain throughout shift as needed - see MAR. VSS. Jabari Rodriguez RN

## 2023-12-26 NOTE — ED NOTES
Patient on montor, on pads, and all medication given per STEMI box and MD orders. Patient updated on the plan of care. Patient ready for transport to cath lab.

## 2023-12-26 NOTE — CONSULTS
"Nutrition Services    Patient Name: Hermelindo Zuñiga  YOB: 1955  MRN: [id number]  Admission date: 12/25/2023      CLINICAL NUTRITION ASSESSMENT      Reason for Assessment  BMI   H&P:  Past Medical History:   Diagnosis Date    Arthritis     COPD (chronic obstructive pulmonary disease)     Hypertension         Current Problems:   Active Hospital Problems    Diagnosis     **STEMI (ST elevation myocardial infarction)         Nutrition/Diet History         Narrative   Patient presented to ED for evaluation of acute substernal chest pain radiating down the left arm. Admitted to ICU with STEMI. Weight hx reveals patient's weight is trending up, however, patient reports ~ 25 lbs lost \"recently\".  Patient's BMI is worrisome. Patient with visible fat and muscles losses. RD performed NFPE, see full report below. Pt amenable to ONS TID.      Anthropometrics        Current Height, Weight Height: 185.4 cm (73\")  Weight: 60.2 kg (132 lb 11.5 oz)   Current BMI Body mass index is 17.51 kg/m².   BMI Classification Underweight   % IBW 75%   Adjusted Body Weight (ABW)    Weight Hx  Wt Readings from Last 30 Encounters:   12/25/23 1918 60.2 kg (132 lb 11.5 oz)   11/27/23 1541 56.7 kg (125 lb)   08/12/23 0840 54.7 kg (120 lb 9.5 oz)   06/09/23 1400 56.7 kg (124 lb 14.4 oz)          Wt Change Observation Trending up     Estimated/Assessed Needs  Estimated Needs based on: Ideal Body Weight 80 kg       Energy Requirements 25-30 kcal/kg   EST Needs (kcal/day) 8860-9458 kcal       Protein Requirements 1.0-1.2 g/kg   EST Daily Needs (g/day) 80-96 g       Fluid Requirements 25-30 ml/kg    Estimated Needs (mL/day) 8885-6487 ml     Labs/Medications         Pertinent Labs Reviewed.   Results from last 7 days   Lab Units 12/26/23  0159 12/25/23  1927   SODIUM mmol/L 120* 122*   POTASSIUM mmol/L 4.2 4.3   CHLORIDE mmol/L 89* 89*   CO2 mmol/L 18.8* 19.7*   BUN mg/dL 8 7*   CREATININE mg/dL 0.53* 0.61*   CALCIUM mg/dL 8.8 9.0 " "  BILIRUBIN mg/dL 0.4 0.3   ALK PHOS U/L 88 89   ALT (SGPT) U/L 10 10   AST (SGOT) U/L 26 29   GLUCOSE mg/dL 93 100*     Results from last 7 days   Lab Units 12/26/23  0159 12/25/23  1927   MAGNESIUM mg/dL 1.7 1.7   PHOSPHORUS mg/dL 3.6  --    HEMOGLOBIN g/dL 13.8 13.3   HEMATOCRIT % 39.7 38.4   TRIGLYCERIDES mg/dL  --  68     No results found for: \"COVID19\"  Lab Results   Component Value Date    HGBA1C 5.40 12/25/2023         Pertinent Medications Reviewed.     Malnutrition Severity Assessment      Patient meets criteria for : Severe Malnutrition  Malnutrition Type (last 8 hours)       Malnutrition Severity Assessment       Row Name 12/26/23 1404       Malnutrition Severity Assessment    Malnutrition Type Chronic Disease - Related Malnutrition      Row Name 12/26/23 1404       Muscle Loss    Loss of Muscle Mass Findings Severe    [place] Region Moderate - slight depression    Clavicle Bone Region Moderate - some protrusion in females, visible in males    Acromion Bone Region Moderate - acromion may slightly protrude    Patellar Region Severe - prominent bone, square looking, very little muscle definition    Anterior Thigh Region Severe - line/depression along thigh, obviously thin    Posterior Calf Region Severe - thin with very little definition/firmness      Row Name 12/26/23 1404       Fat Loss    Subcutaneous Fat Loss Findings Severe    Orbital Region  Moderate -  somewhat hollowness, slightly dark circles    Upper Arm Region Severe - mostly skin, very little space between folds, fingers touch    Thoracic & Lumbar Region Severe - ribs visible with prominent depressions, iliac crest very prominent      Row Name 12/26/23 1404       Criteria Met (Must meet criteria for severity in at least 2 of these categories: M Wasting, Fat Loss, Fluid, Secondary Signs, Wt. Status, Intake)    Patient meets criteria for  Severe Malnutrition                       Nutrition Diagnosis         Nutrition Dx Problem 1 Severe " malnutrition related to decreased ability to consume sufficient energy as evidenced by decreased appetite., unintended weight change., body composition changes., and patient report.     Nutrition Intervention           Current Nutrition Orders & Evaluation of Intake       Current PO Diet Diet: Cardiac Diets; Healthy Heart (2-3 Na+); Texture: Regular Texture (IDDSI 7); Fluid Consistency: Thin (IDDSI 0)   Supplement No active supplement orders           Nutrition Intervention/Prescription        Boost Plus TID ( 1080 kcal, 42 g pro)        Medical Nutrition Therapy/Nutrition Education          Learner     Readiness Patient  Acceptance     Method     Response Explanation  Verbalizes understanding     Monitor/Evaluation        Monitor Per protocol, PO intake, Supplement intake, Weight, Symptoms, POC/GOC     Nutrition Discharge Plan         Continue with high calorie/high protein supplement.     Electronically signed by:  Bess Watts RD  12/26/23 14:04 EST

## 2023-12-26 NOTE — PROGRESS NOTES
[facility]     Cardiology Progress Note    Patient Name: Hermelindo Zuñiga  : 1955  MRN: [id number]  Primary Care Physician:  Antony Florence MD  Date of admission: 2023    Subjective   Subjective   Chief complaint  Chest pain, abnormal EKG, right upper lobe consolidation    HPI:  Patient Reports overall he feels well.  He notes no chest pain or nausea.  He does note that he is aching throughout his back from laying in the bed.    Review of Systems   All systems were reviewed and negative except for: Backache    Objective   Objective     Vitals:   Temp:  [97.4 °F (36.3 °C)-98.1 °F (36.7 °C)] 97.4 °F (36.3 °C)  Heart Rate:  [68-91] 88  Resp:  [13-16] 13  BP: (100-190)/() 133/108  Flow (L/min):  [4] 4  Physical Exam   Neck: no JVD, no bruit  Lungs: clear to ausculation bilaterally.  No crackles or wheezes  CV: regular rate and rhythm, no murmur  Ext: no cyanosis, clubbing or edema.  Normal bilateral LE pulses.      Scheduled Meds:amLODIPine, 10 mg, Oral, Daily  aspirin, 81 mg, Oral, Daily  atorvastatin, 40 mg, Oral, Nightly  azithromycin, 500 mg, Intravenous, Q24H  cefTRIAXone, 1,000 mg, Intravenous, Q24H  ipratropium-albuterol, 3 mL, Nebulization, 4x Daily - RT  lisinopril, 10 mg, Oral, Q24H  metoprolol succinate XL, 25 mg, Oral, Q24H  ticagrelor, 90 mg, Oral, BID      Continuous Infusions:        Result Review    Result Review:  I have personally reviewed the results from the time of this admission to 2023 10:56 EST and agree with these findings:  [x]  Laboratory  []  Microbiology  [x]  Radiology  [x]  EKG/Telemetry   [x]  Cardiology/Vascular   []  Pathology  []  Old records  []  Other:  Most notable findings include:     CBC          2023    19:27 2023    01:59   CBC   WBC 5.17  4.53    RBC 4.20  4.34    Hemoglobin 13.3  13.8    Hematocrit 38.4  39.7    MCV 91.4  91.5    MCH 31.7  31.8    MCHC 34.6  34.8    RDW 13.2  13.1    Platelets 321  311      CMP           12/25/2023    19:27 12/26/2023    01:59   CMP   Glucose 100  93    BUN 7  8    Creatinine 0.61  0.53    EGFR 105.9  110.5    Sodium 122  120    Potassium 4.3  4.2    Chloride 89  89    Calcium 9.0  8.8    Total Protein 7.2  7.1    Albumin 3.5  3.5    Globulin 3.7  3.6    Total Bilirubin 0.3  0.4    Alkaline Phosphatase 89  88    AST (SGOT) 29  26    ALT (SGPT) 10  10    Albumin/Globulin Ratio 0.9  1.0    BUN/Creatinine Ratio 11.5  15.1    Anion Gap 13.3  12.2       CARDIAC LABS:      Lab 12/25/23  2152 12/25/23  1927   PROBNP  --  285.8   HSTROP T 11 11        Assessment & Plan   Assessment / Plan     Brief Patient Summary:  Hermelindo Zuñiga is a 68 y.o. male who presented with chest pain.  He had an abnormal EKG.  He had JANELL-3 flow to both the left circumflex and LAD.  He did receive a proximal left circumflex stent that was 3.0 x 15 mm postdilated up to 3.25.  He received an LAD proximal to mid stent that was 3.25 x 33 mm postdilated up to 3.5 mm.  He had no significant disease in the RCA.  He does have more distal left circumflex diffuse disease up to 70 to 75% after the takeoff of a OM.  His ejection fraction appears at approximately 50%.  He is an appropriate septal motion.  He has mild tricuspid regurgitation with an RVSP in the 50s.  He has known COPD and smokes.  He was found to have a right upper lobe consolidation and they are planning a CAT scan.  He is being followed by pulmonary.    Active Hospital Problems:  Active Hospital Problems    Diagnosis     **STEMI (ST elevation myocardial infarction)        Assessment:  1.  Coronary artery disease status post proximal left circumflex stent and proximal to mid LAD stent.  He has residual more distal left circumflex stenosis that is in the 70 to 75% range.  2.  Right upper lobe consolidation  3.  Tobacco abuse  4.  Hypertension  5.  Hyperlipidemia    Plan:   1.  I reviewed patient's cath films.  His distal left circumflex has diffuse disease in the 70 to  75% range.  This does look like stable stenosis.  He does appear in some views to possibly have a area to touch down the stent without affecting the OM.  He would need both these vessels wired.  Would consider possibly only balloon angioplasty of the OM if plaque shift.  I do not think this is emergent and could likely be done as an outpatient in 1 to 2 weeks.  2.  Continue the aspirin and Brilinta.  Continue the Lipitor, Toprol, lisinopril and amlodipine.  3.  Smoking cessation  4.  Patient is getting a CAT scan by pulmonary for this right upper lobe consolidation.  5.  Feel patient can be transferred to the floor under telemetry.       CODE STATUS:   Level Of Support Discussed With: Patient  Code Status (Patient has no pulse and is not breathing): CPR (Attempt to Resuscitate)  Medical Interventions (Patient has pulse or is breathing): Full Support      Electronically signed by Altaf Barrera MD, 12/26/23, 10:56 AM EST.

## 2023-12-26 NOTE — CONSULTS
[facility]   Consult Note    Patient Name: Hermelindo Zuñiga  : 1955  MRN: [id number]  Primary Care Physician:  Antony Florence MD  Referring Physician: No ref. provider found  Date of admission: 2023    Consults  Subjective   Subjective     Reason for Consult/ Chief Complaint: Reason for consultation abnormal imaging of the chest    History of Present Illness  Hermelindo Zuñiga is a 68 y.o. male admitted to the hospital overnight for chest pain, patient was found to have STEMI, patient was taken to the Cath Lab he did receive a proximal left circumflex stent with a drug-eluting stent, chest x-ray obtained on admission showed a dense consolidative opacity in the right upper lobe patient is also found to have not low natremia.  Patient does have significant smoking history.  Pulmonary consulted due to concern for possible lung mass    Review of Systems   Positive shortness of breath  Positive for chest pain  Personal History     Past Medical History:   Diagnosis Date    Arthritis     COPD (chronic obstructive pulmonary disease)     Hypertension        Past Surgical History:   Procedure Laterality Date    CARDIAC CATHETERIZATION N/A 2023    Procedure: Left Heart Cath;  Surgeon: Jeancarlos White MD;  Location: [facility] CATH INVASIVE LOCATION;  Service: Cardiovascular;  Laterality: N/A;    CARDIAC CATHETERIZATION N/A 2023    Procedure: Percutaneous Coronary Intervention;  Surgeon: Jeancarlos White MD;  Location: [facility] CATH INVASIVE LOCATION;  Service: Cardiovascular;  Laterality: N/A;       Family History: family history is not on file. Otherwise pertinent FHx was reviewed and not pertinent to current issue.    Social History:  reports that he has been smoking cigarettes. He has a 25.00 pack-year smoking history. He has never used smokeless tobacco. He reports that he does not drink alcohol and does not use drugs.    Home Medications:   HYDROcodone-acetaminophen, acyclovir, albuterol  sulfate HFA, cholecalciferol, gabapentin, ibuprofen, lisinopril, and ondansetron ODT    Allergies:  No Known Allergies    Objective    Objective     Vitals:  Temp:  [97.4 °F (36.3 °C)-98.1 °F (36.7 °C)] 97.4 °F (36.3 °C)  Heart Rate:  [68-91] 90  Resp:  [13-18] 18  BP: (100-190)/() 133/108  Flow (L/min):  [4] 4    Physical Exam  Chronically ill-appearing male  His skin has a red yun appearance  He does have poor air exchange  He does have some fullness of the supraclavicular area but no palpable lymphadenopathy  He is alert and oriented he is in no acute distress  Result Review    Result Review:  I have personally reviewed the results from the time of this admission to 12/26/2023 14:36 EST and agree with these findings:  [x]  Laboratory  [x]  Microbiology  [x]  Radiology  [x]  EKG/Telemetry   [x]  Cardiology/Vascular   []  Pathology  []  Old records  []  Other:    Most notable findings include: Chest x-ray showing right upper lobe infiltrate concerning for mass    Assessment & Plan   Assessment / Plan     Brief Patient Summary:  Hermelindo Zuñiga is a 68 y.o. male who in the intensive care unit status post STEMI found to have an abnormal chest x-ray    Active Hospital Problems:  Active Hospital Problems    Diagnosis     **STEMI (ST elevation myocardial infarction)    Right upper lobe mass/consolidation  Hyponatremia euvolemic  Possible right upper lobe pneumonia concern for gram-positive organisms    Plan:   Chest x-ray reviewed  I am concerned about the presence of a lung mass given his significant smoking history and his hyponatremia  We will order a noncontrasted CT scan of the chest  Due to the fact that the patient could have a pneumonia we will start patient on ceftriaxone and Zithromax  Will send urine and Legionella antigen    Depending on the results of the CT scan that will help further determine if we need to perform biopsy or not, this would be very tricky situation as the patient has  received significant antiplatelet therapy and did recently have a drug-eluting stent placed    I personally reviewed all imaging, laboratory data, and I spoke with respiratory therapy, and nursing regarding the patient's care, have also spoken with the patient's primary admitting physician regarding his plan of care.      Electronically signed by Morteza Davila DO, 12/26/23, 2:36 PM EST.

## 2023-12-26 NOTE — ED PROVIDER NOTES
Time: 7:27 PM EST  Date of encounter:  12/25/2023  Independent Historian/Clinical History and Information was obtained by:   Patient and Nursing Staff    History is limited by: N/A    Chief Complaint: Chest pain today      History of Present Illness:  Patient is a 68 y.o. year old male with history of hypertension and COPD and tobacco abuse who presents to the emergency department for evaluation of acute substernal chest pain occurring a few hours ago today and radiating down the left arm.    It was associated with some shortness of breath and nausea and vomiting and he actually passed out in the ambulance.    Blood pressure was mildly low on arrival measuring 100 and over 80, so no nitroglycerin was given.  He was already given 324 mg of aspirin.    He denies any known history of coronary artery disease.  He is still having the chest pain now.    HPI    Patient Care Team  Primary Care Provider: Antony Florence MD    Past Medical History:   Reviewed  No Known Allergies  Past Medical History:   Diagnosis Date    Arthritis     COPD (chronic obstructive pulmonary disease)     Hypertension      History reviewed. No pertinent surgical history.  History reviewed. No pertinent family history.    Home Medications:  Prior to Admission medications    Medication Sig Start Date End Date Taking? Authorizing Provider   acyclovir (ZOVIRAX) 400 MG tablet Take 1 tablet by mouth 5 (Five) Times a Day. Take no more than 5 doses a day. 8/12/23   Dennis Woods MD   albuterol sulfate  (90 Base) MCG/ACT inhaler Inhale 2 puffs Every 4 (Four) Hours As Needed for Wheezing.    Provider, MD Pricila   amLODIPine (NORVASC) 5 MG tablet Take 1 tablet by mouth Daily.    Provider, MD Pricila   diphenhydrAMINE (BENADRYL) 2 % cream Apply 1 application  topically to the appropriate area as directed 3 (Three) Times a Day As Needed for Itching. 8/12/23   Dennis Woods MD   gabapentin (NEURONTIN) 600 MG tablet Take 1 tablet by mouth 3  "(Three) Times a Day.    Provider, MD Pricila   ondansetron ODT (ZOFRAN-ODT) 4 MG disintegrating tablet Place 1 tablet on the tongue Every 8 (Eight) Hours As Needed for Nausea or Vomiting for up to 10 doses. 11/27/23   Sandi Iverson MD   triamcinolone (KENALOG) 0.1 % cream apply topically TO THE AFFECTED AREA(S) TWICE DAILY for eczema 11/14/23   Provider, MD Pricila        Social History:   Social History     Tobacco Use    Smoking status: Every Day     Packs/day: 0.50     Years: 50.00     Additional pack years: 0.00     Total pack years: 25.00     Types: Cigarettes    Smokeless tobacco: Never   Vaping Use    Vaping Use: Never used   Substance Use Topics    Alcohol use: Never    Drug use: Never         Review of Systems:  Review of Systems   I performed a 10 point review of systems which was all negative, except for the positives found in the HPI above.  Physical Exam:  /90 (BP Location: Left arm, Patient Position: Lying)   Pulse 80   Temp 98.1 °F (36.7 °C) (Oral)   Resp 16   Ht 185.4 cm (73\")   Wt 60.2 kg (132 lb 11.5 oz)   SpO2 94%   BMI 17.51 kg/m²     Physical Exam   General: Awake alert and in moderate distress, cachectic appearing    HEENT: Head normocephalic atraumatic, eyes PERRLA EOMI, nose normal, oropharynx normal.    Neck: Supple full range of motion, no meningismus, no lymphadenopathy    Heart: Regular rate and rhythm, no murmurs or rubs, 2+ radial pulses bilaterally    Lungs: Clear to auscultation bilaterally without wheezes or crackles, no respiratory distress    Abdomen: Soft, nontender, nondistended, no rebound or guarding    Skin: Warm, dry, no rash    Musculoskeletal: Normal range of motion, no lower extremity edema, no calf tenderness    Neurologic: Oriented x3, no motor deficits no sensory deficits    Psychiatric: Mood appears stable, no psychosis          Procedures:  Procedures      Medical Decision Making:      Comorbidities that affect care:    COPD, Hypertension, " Smoking    External Notes reviewed:    None      The following orders were placed and all results were independently analyzed by me:  Orders Placed This Encounter   Procedures    XR Chest 1 View    [place] Draw    High Sensitivity Troponin T    Comprehensive Metabolic Panel    Lipase    BNP    Magnesium    CBC Auto Differential    Lipid Panel    Hemoglobin A1c    High Sensitivity Troponin T 2Hr    Activated Clotting Time (ACT)    Activated Clotting Time (ACT)    Procalcitonin    Sodium, Urine, Random - Urine, Clean Catch    Creatinine Urine Random (kidney function) GFR component - Urine, Clean Catch    Osmolality, Urine - Urine, Clean Catch    Osmolality, Serum    Phosphorus    Magnesium    Comprehensive Metabolic Panel    CBC Auto Differential    Diet: Cardiac Diets; Healthy Heart (2-3 Na+); Texture: Regular Texture (IDDSI 7); Fluid Consistency: Thin (IDDSI 0)    Undress & Gown    Vital Signs and Check distal extremity for warmth, color, sensation and pulses with each vital sign and site check.    Telemetry - Maintain IV Access    Telemetry - Place Orders & Notify Provider of Results When Patient Experiences Acute Chest Pain, Dysrhythmia or Respiratory Distress    No IM injections, hold pressure on venipuncture sites for 5 minutes.    Change site dressing    Encourage fluids    Strict intake and output    Continuous Pulse Oximetry    Advance Diet As Tolerated -    Notify MD if platelet count is less than 100,000, is less than 1/2 baseline, or if Hgb drops by more than 3mg/dl.    Notify MD of hypotension (SBP less than 95), bleeding, or dysrythmia and follow Sheath Removal Policy if needed.    Notify Provider - Labs    Assess Puncture Site, Vital SIgns, Distal Pulses & Observe Site for Bleeding or Swelling    Remove Femoral / Brachial Sheaths    Apply Femostop    Head of Bed: Flat; Other; 0.5; Elevate Head of Bed: 30 Degrees; 5 Hours; Activity Level: Strict Bed Rest; Keep Affected Extremity/ Extremities Straight     For Vasovagal Response    Code Status and Medical Interventions:    Cardiology - Interventional (on-call MD unless specified)    Cardiac Rehab Evaluation and Enrollment    Inpatient Case Management  Consult    Inpatient Hospitalist Consult    Inpatient Pulmonology Consult    Oxygen Therapy- Nasal Cannula; Titrate 1-6 LPM Per SpO2; 90 - 95%    RT to Initiate Bronchodilator Protocol    POC Activated Clotting Time    POC Activated Clotting Time    POC Activated Clotting Time    POC Activated Clotting Time    POC Activated Clotting Time    ECG 12 Lead ED Triage Standing Order; Chest Pain    ECG 12 Lead ED Triage Standing Order; Chest Pain    ECG 12 Lead Chest Pain    Adult Transthoracic Echo Complete W/ Cont if Necessary Per Protocol    Insert Peripheral IV    Inpatient Admission    CBC & Differential    Green Top (Gel)    Lavender Top    Gold Top - SST    Light Blue Top    CBC & Differential       Medications Given in the Emergency Department:  Medications   sodium chloride 0.9 % flush 10 mL ( Intravenous MAR Unhold 12/25/23 2122)   nitroglycerin (NITROSTAT) SL tablet 0.4 mg ( Sublingual MAR Unhold 12/25/23 2122)   nitroglycerin (NITROSTAT) SL tablet 0.4 mg (has no administration in time range)   acetaminophen (TYLENOL) tablet 650 mg (has no administration in time range)   HYDROcodone-acetaminophen (NORCO) 5-325 MG per tablet 1 tablet (1 tablet Oral Given 12/25/23 2208)   ondansetron ODT (ZOFRAN-ODT) disintegrating tablet 4 mg (has no administration in time range)     Or   ondansetron (ZOFRAN) injection 4 mg (has no administration in time range)   diphenhydrAMINE (BENADRYL) capsule 25 mg (has no administration in time range)   atropine sulfate injection 0.5 mg (has no administration in time range)   sodium chloride 0.9 % infusion 250 mL (has no administration in time range)   ticagrelor (BRILINTA) tablet 90 mg (has no administration in time range)   atorvastatin (LIPITOR) tablet 40 mg (40 mg Oral Given  12/25/23 2204)   metoprolol succinate XL (TOPROL-XL) 24 hr tablet 25 mg (has no administration in time range)   aspirin EC tablet 81 mg (has no administration in time range)   amLODIPine (NORVASC) tablet 10 mg (10 mg Oral Given 12/25/23 2351)   sodium chloride 0.9 % infusion (has no administration in time range)   ipratropium-albuterol (DUO-NEB) nebulizer solution 3 mL (has no administration in time range)   morphine injection 2 mg (2 mg Intravenous Given 12/25/23 1942)   ondansetron (ZOFRAN) injection 4 mg (4 mg Intravenous Given 12/25/23 1941)   sodium chloride 0.9 % bolus 500 mL (500 mL Intravenous New Bag 12/25/23 1940)        ED Course:         Labs:    Lab Results (last 24 hours)       Procedure Component Value Units Date/Time    High Sensitivity Troponin T [[id number]]  (Normal) Collected: 12/25/23 1927    Specimen: Blood Updated: 12/25/23 2007     HS Troponin T 11 ng/L     Narrative:      High Sensitive Troponin T Reference Range:  <14.0 ng/L- Negative Female for AMI  <22.0 ng/L- Negative Male for AMI  >=14 - Abnormal Female indicating possible myocardial injury.  >=22 - Abnormal Male indicating possible myocardial injury.   Clinicians would have to utilize clinical acumen, EKG, Troponin, and serial changes to determine if it is an Acute Myocardial Infarction or myocardial injury due to an underlying chronic condition.         CBC & Differential [[id number]]  (Abnormal) Collected: 12/25/23 1927    Specimen: Blood Updated: 12/25/23 1942    Narrative:      The following orders were created for panel order CBC & Differential.  Procedure                               Abnormality         Status                     ---------                               -----------         ------                     CBC Auto Differential[[id number]]        Abnormal            Final result                 Please view results for these tests on the individual orders.    Comprehensive Metabolic Panel [[id number]]  (Abnormal) Collected:  12/25/23 1927    Specimen: Blood Updated: 12/25/23 2007     Glucose 100 mg/dL      BUN 7 mg/dL      Creatinine 0.61 mg/dL      Sodium 122 mmol/L      Potassium 4.3 mmol/L      Comment: Slight hemolysis detected by analyzer. Result may be falsely elevated.        Chloride 89 mmol/L      CO2 19.7 mmol/L      Calcium 9.0 mg/dL      Total Protein 7.2 g/dL      Albumin 3.5 g/dL      ALT (SGPT) 10 U/L      AST (SGOT) 29 U/L      Alkaline Phosphatase 89 U/L      Total Bilirubin 0.3 mg/dL      Globulin 3.7 gm/dL      A/G Ratio 0.9 g/dL      BUN/Creatinine Ratio 11.5     Anion Gap 13.3 mmol/L      eGFR 105.9 mL/min/1.73     Narrative:      GFR Normal >60  Chronic Kidney Disease <60  Kidney Failure <15      Lipase [[id number]]  (Normal) Collected: 12/25/23 1927    Specimen: Blood Updated: 12/25/23 2007     Lipase 22 U/L     BNP [[id number]]  (Normal) Collected: 12/25/23 1927    Specimen: Blood Updated: 12/25/23 2003     proBNP 285.8 pg/mL     Narrative:      This assay is used as an aid in the diagnosis of individuals suspected of having heart failure. It can be used as an aid in the diagnosis of acute decompensated heart failure (ADHF) in patients presenting with signs and symptoms of ADHF to the emergency department (ED). In addition, NT-proBNP of <300 pg/mL indicates ADHF is not likely.    Age Range Result Interpretation  NT-proBNP Concentration (pg/mL:      <50             Positive            >450                   Gray                 300-450                    Negative             <300    50-75           Positive            >900                  Gray                300-900                  Negative            <300      >75             Positive            >1800                  Gray                300-1800                  Negative            <300    Magnesium [[id number]]  (Normal) Collected: 12/25/23 1927    Specimen: Blood Updated: 12/25/23 2007     Magnesium 1.7 mg/dL     CBC Auto Differential [[id number]]  (Abnormal)  Collected: 12/25/23 1927    Specimen: Blood Updated: 12/25/23 1942     WBC 5.17 10*3/mm3      RBC 4.20 10*6/mm3      Hemoglobin 13.3 g/dL      Hematocrit 38.4 %      MCV 91.4 fL      MCH 31.7 pg      MCHC 34.6 g/dL      RDW 13.2 %      RDW-SD 44.2 fl      MPV 8.2 fL      Platelets 321 10*3/mm3      Neutrophil % 61.5 %      Lymphocyte % 17.4 %      Monocyte % 16.4 %      Eosinophil % 3.9 %      Basophil % 0.6 %      Immature Grans % 0.2 %      Neutrophils, Absolute 3.18 10*3/mm3      Lymphocytes, Absolute 0.90 10*3/mm3      Monocytes, Absolute 0.85 10*3/mm3      Eosinophils, Absolute 0.20 10*3/mm3      Basophils, Absolute 0.03 10*3/mm3      Immature Grans, Absolute 0.01 10*3/mm3      nRBC 0.0 /100 WBC     Lipid Panel [[id number]]  (Abnormal) Collected: 12/25/23 1927    Specimen: Blood Updated: 12/25/23 2019     Total Cholesterol 116 mg/dL      Triglycerides 68 mg/dL      HDL Cholesterol 32 mg/dL      LDL Cholesterol  70 mg/dL      VLDL Cholesterol 14 mg/dL      LDL/HDL Ratio 2.20    Narrative:      Cholesterol Reference Ranges  (U.S. Department of Health and Human Services ATP III Classifications)    Desirable          <200 mg/dL  Borderline High    200-239 mg/dL  High Risk          >240 mg/dL      Triglyceride Reference Ranges  (U.S. Department of Health and Human Services ATP III Classifications)    Normal           <150 mg/dL  Borderline High  150-199 mg/dL  High             200-499 mg/dL  Very High        >500 mg/dL    HDL Reference Ranges  (U.S. Department of Health and Human Services ATP III Classifications)    Low     <40 mg/dl (major risk factor for CHD)  High    >60 mg/dl ('negative' risk factor for CHD)        LDL Reference Ranges  (U.S. Department of Health and Human Services ATP III Classifications)    Optimal          <100 mg/dL  Near Optimal     100-129 mg/dL  Borderline High  130-159 mg/dL  High             160-189 mg/dL  Very High        >189 mg/dL    Hemoglobin A1c [[id number]] Collected: 12/25/23 1927  "   Specimen: Blood Updated: 12/25/23 2007    POC Activated Clotting Time [[id number]]  (Abnormal) Collected: 12/25/23 2041    Specimen: Blood Updated: 12/25/23 2049     Activated Clotting Time  260 Seconds      Comment: Serial Number: [id number]:  [id number]       POC Activated Clotting Time [[id number]]  (Abnormal) Collected: 12/25/23 2117    Specimen: Blood Updated: 12/25/23 2123     Activated Clotting Time  271 Seconds      Comment: Serial Number: [id number]:  [id number]       High Sensitivity Troponin T 2Hr [[id number]]  (Normal) Collected: 12/25/23 2152    Specimen: Blood from Arm, Left Updated: 12/25/23 2322     HS Troponin T 11 ng/L      Troponin T Delta 0 ng/L     Narrative:      High Sensitive Troponin T Reference Range:  <14.0 ng/L- Negative Female for AMI  <22.0 ng/L- Negative Male for AMI  >=14 - Abnormal Female indicating possible myocardial injury.  >=22 - Abnormal Male indicating possible myocardial injury.   Clinicians would have to utilize clinical acumen, EKG, Troponin, and serial changes to determine if it is an Acute Myocardial Infarction or myocardial injury due to an underlying chronic condition.         Procalcitonin [[id number]]  (Abnormal) Collected: 12/25/23 2152    Specimen: Blood from Arm, Left Updated: 12/25/23 2347     Procalcitonin 2.44 ng/mL     Narrative:      As a Marker for Sepsis (Non-Neonates):    1. <0.5 ng/mL represents a low risk of severe sepsis and/or septic shock.  2. >2 ng/mL represents a high risk of severe sepsis and/or septic shock.    As a Marker for Lower Respiratory Tract Infections that require antibiotic therapy:    PCT on Admission    Antibiotic Therapy       6-12 Hrs later    >0.5                Strongly Recommended  >0.25 - <0.5        Recommended  0.1 - 0.25          Discouraged              Remeasure/reassess PCT  <0.1                Strongly Discouraged     Remeasure/reassess PCT    As 28 day mortality risk marker: \"Change in Procalcitonin Result\" (>80% or <=80%) " if Day 0 (or Day 1) and Day 4 values are available. Refer to [email or web address]    Change in PCT <=80%  A decrease of PCT levels below or equal to 80% defines a positive change in PCT test result representing a higher risk for 28-day all-cause mortality of patients diagnosed with severe sepsis for septic shock.    Change in PCT >80%  A decrease of PCT levels of more than 80% defines a negative change in PCT result representing a lower risk for 28-day all-cause mortality of patients diagnosed with severe sepsis or septic shock.    This test is Prognostic not Diagnostic, if elevated correlate with clinical findings before administering antibiotic treatment.        Activated Clotting Time (ACT) [[id number]] Collected: 12/25/23 2314    Specimen: Blood from Arm, Left Updated: 12/25/23 2328    POC Activated Clotting Time [[id number]]  (Abnormal) Collected: 12/25/23 2314    Specimen: Blood Updated: 12/25/23 2320     Activated Clotting Time  206 Seconds      Comment: Serial Number: [id number]:  [id number]                Imaging:    Cardiac Catheterization/Vascular Study    Result Date: 12/25/2023  OPERATORS Jeancarlos White M.D. (Attending Cardiologist)  PROCEDURES PERFORMED Ultrasound guided Vascular access Left Heart Catheterization Coronary Angiogram PCI of left circumflex artery IVUS of left circumflex artery PCI of proximal LAD IVUS of LAD Moderate sedation  INDICATIONS FOR PROCEDURE 68 years old man with multiple cardiovascular risk factors presented to the hospital with chest pain and dynamic ECG changes in the anterolateral leads.  After receiving nitroglycerin he became severely hypotensive into 70s systolic.  After emergent discussion of risk and benefit of the procedure he was brought to the Cath Lab for definitive coronary angiography.  PROCEDURE IN DETAIL Informed consent was obtained from the patient after explaining the risks, benefits, and alternative options of the procedure. After obtaining  informed consent, the patient was brought to the cath lab and was prepped in a sterile fashion. Lidocaine 2% was used for local anesthesia into the right femoral access site. The right femoral artery was accessed with a micropuncture needle via modified Seldinger technique under ultrasound guidance. A 6F was inserted successfully. Afterwards, 6F JR4 and JL5 diagnostic catheters were advanced over a wire into the ascending aorta and were used to engage the ostia of the left main and RCA respectively. JR4 used to cross the AV and obtain LV pressures and gradient across the AV measured via pullback technique. Images of the right and left coronary systems were obtained.  HEMODYNAMICS  LV: 115/0, 3 mmHg AO: 119/69, 89 mmHg No significant gradient across the aortic valve during pullback of JR4 catheter.  LV gram was not performed during the study.  FINDINGS Coronary Angiogram Right dominant circulation Left main: Left main is a large caliber vessel which gives rise to the Left Anterior Descending and the Left circumflex.  Ostium of the left main has 20% stenosis. Left Anterior Descending Artery: LAD is a medium caliber vessel which gives rise to several septal perforators and several diagonal branches.  Mid LAD has 80% tubular stenosis.  JANELL-3 flow Left Circumflex: Left circumflex artery gives rise to obtuse marginals.  Proximal left circumflex has tubular 80% hazy appearing stenosis.  Mid segment of left circumflex at bifurcation with OM has a complex 70% stenosis in both segments.  JANELL-3 flow Right Coronary Artery: The RCA is a medium caliber vessel gives rise to PDA and PLV.  Mid RCA has diffuse 40% stenosis. Percutaneous coronary intervention Diagnostic coronary angiography suggested both left circumflex and LAD to be contributed to ischemia and symptoms.  Left circumflex being the worst of the 2 vessels. 100 units/kg of heparin was administered and ACT of more than 250 was documented. 0.014 run-through wire was  then advanced into the left circumflex artery. I then advanced intravascular ultrasound catheter into the proximal segment of left circumflex. IVUS showed proximal left circumflex to be 3 mm and mid left circumflex to be 3.5 mm in dimension.  IVUS also showed calcification with thrombus in the proximal left circumflex. The lesion in the proximal left circumflex was predilated with 2.5 x 12 mm compliant balloon at 12 lisa. This was followed by placement of 3 x 15 mm Xience magi point stent which was then postdilated with 3.25 x 12 mm noncompliant balloon at 14 lisa. I then noted a new 50% stenosis at the ostium of left circumflex artery which was treated with balloon angioplasty by a 3 x 12 mm compliant balloon at 12 lisa. Final angiography showed 30% stenosis at the ostium of left circumflex artery.  JANELL-3 flow in the left circumflex artery and OM.  I did not treat mid left circumflex vessel as it was a complex bifurcating lesion which would have required more contrast and time while the patient was hypotensive receiving pressors and IV fluid. I then quickly diverted my attention to the LAD which was wired with 0.014 run-through wire. Intravascular ultrasound catheter was then advanced into the mid LAD and a slow manual pullback was performed. IVUS showed LAD size to be 3.5 mm.  IVUS also confirmed 80% calcific stenosis in the mid LAD. I then predilated this lesion with 3.25 x 12 mm noncompliant balloon at 12 lisa. This was followed by placement of 3.25 x 33 mm Xience magi point stent. The stent was postdilated with 3.5 x 12 mm noncompliant balloon at 16 lisa. Final angiography showed 0% stenosis in LAD and JANELL-3 flow. All the catheters were exchanged over a wire and subsequently removed.  6 Albanian introducer sheath was left in place to be pulled manually at a later time when ACT is less than 150  ESTIMATED BLOOD LOSS: 10 ml  COMPLICATIONS: None  IMPRESSIONS PCI of proximal left circumflex and mid LAD. Low LVEDP   RECOMMENDATIONS -Start dual antiplatelet therapy, high intensity statin and beta-blocker -Staged bifurcation PCI of mid left circumflex artery/OM -Smoking cessation -Obtain an echocardiogram -Cardiac rehab at the time of discharge. Electronically signed by Jeancarlos White MD, 12/25/23, 9:52 PM EST.       XR Chest 1 View    Result Date: 12/25/2023  PROCEDURE: XR CHEST 1 VW  COMPARISON: [facility], , CHEST AP/PA 1 VIEW, 4/15/2013, 7:42.  INDICATIONS: Chest Pain Triage Protocol  FINDINGS:  Several benign calcified granulomas are noted.  There is a consolidation in the right upper lung.  The heart and mediastinal contours appear normal.  The pulmonary vasculature appears normal.  The osseous structures appear intact.       Consolidation within right upper lung, concerning for pneumonia.  Recommend follow-up CXR in 4-6 weeks to document resolution.       BLAIR ROBERSON MD       Electronically Signed and Approved By: BLAIR ROBERSON MD on 12/25/2023 at 19:53                Differential Diagnosis and Discussion:    Chest Pain:  Based on the patient's signs and symptoms, I considered aortic dissection, myocardial infaction, pulmonary embolism, cardiac tamponade, pericarditis, pneumothorax, musculoskeletal chest pain and other differential diagnosis as an etiology of the patient's chest pain.   Syncope: Differential diagnosis includes but is not limited to TIA, hyperventilation, aortic stenosis, pulmonary emboli, myocardial disease, bradycardia arrhythmia, heart block, tachyarrhythmia, vasovagal, orthostatic hypotension, ruptured AAA, aortic dissection, subarachnoid hemorrhage, seizure, hypoglycemia.    All labs were reviewed and interpreted by me.  All X-rays impressions were independently interpreted by me.  EKG was interpreted by me.    MDM     Amount and/or Complexity of Data Reviewed  Tests in the medicine section of CPT®: reviewed             This patient is a 68-year-old male with COPD and tobacco  abuse and hypertension presenting with chest pain radiating down the left arm that has been going on intermittently for couple weeks but today has been going on more severely the past few hours at home.    He had some nausea and vomiting and actually passed out in the ambulance.    He was already given aspirin prior to arrival and blood pressure mildly low so we held off on nitroglycerin but I will give him a small dose of morphine and Zofran for symptom relief and a small fluid bolus to bolster blood pressure.    EKG shows some concerning ST depressions in the precordial leads concerning for ischemia and I will check cardiac enzymes.      There is also some very mild but concave elevations in the lateral leads in the setting of possible reciprocal depressions on the EKG, so I showed it to the interventional cardiologist and, as the patient was still having active chest pain, we called a STEMI alert and activated the Cath Lab.    I was approached by the EMS personnel later on regarding the rhythm strips and it look like he had 3 rhythm strips printed out the first of which looked normal the second of which showed clear-cut acute STEMI, followed by a completely normal one again in just a minute later.    I consider he could have some coronary artery vasospasm or unstable plaque.    He was given nitroglycerin and heparin and Lipitor per STEMI protocol and taken emergently to the Cath Lab.              Critical Care Note: Total Critical Care time of 30 minutes. Total critical care time documented does not include time spent on separately billed procedures for services of nurses or physician assistants. I personally saw and examined the patient. I have reviewed all diagnostic interpretations and treatment plans as written. I was present for the key portions of any procedures performed and the inclusive time noted in any critical care statement. Critical care time includes patient management by me, time spent at the  patients bedside,  time to review lab and imaging results, discussing patient care, documentation in the medical record, and time spent with family or caregiver.        Patient Care Considerations:          Consultants/Shared Management Plan:    Consultant: I have discussed the case with the interventional cardiologist on-call, who agrees to consult on the patient.    Social Determinants of Health:    Patient is independent, reliable, and has access to care.       Disposition and Care Coordination:    Admit:   Through independent evaluation of the patient's history, physical, and imperical data, the patient meets criteria for observation/admission to the hospital.        Final diagnoses:   Chest pain, unspecified type   ST elevation myocardial infarction (STEMI), unspecified artery        ED Disposition       ED Disposition   Decision to Admit    Condition   --    Comment   Level of Care: Critical Care [6]   Diagnosis: STEMI (ST elevation myocardial infarction) [841346]   Certification: I Certify That Inpatient Hospital Services Are Medically Necessary For Greater Than 2 Midnights                 This medical record created using voice recognition software.             Christiano Le MD  12/25/23 0256

## 2023-12-26 NOTE — CONSULTS
Referring Provider: Jeancarlos White MD    Reason for Consultation: Chest pain, ST elevation MI      Patient Care Team:  Antony Florence MD as PCP - General (Family Medicine)      SUBJECTIVE     Chief Complaint: Chest pain    History of present illness:  Hermelindo Zuñiga is a 68 y.o. male with hypertension, COPD, lifelong smoker, arthritis who presented to the hospital with complaints of chest pain.  First ECG shows significant ischemic changes in the anterolateral leads.  STEMI alert was called for emergent coronary angiography and primary PCI.  Patient was noted to be hypotensive with ongoing chest pain.  He appeared in mild distress.    Review of systems:    Constitutional: No weakness, fatigue, fever, rigors, chills   Eyes: No vision changes, eye pain   ENT/oropharynx: No difficulty swallowing, sore throat, epistaxis, changes in hearing   Cardiovascular: + chest pain, chest tightness, palpitations, paroxysmal nocturnal dyspnea, orthopnea, diaphoresis, dizziness / syncopal episode   Respiratory: + shortness of breath, dyspnea on exertion, cough, wheezing, hemoptysis   Gastrointestinal: No abdominal pain, nausea, vomiting, diarrhea, bloody stools   Genitourinary: No hematuria, dysuria   Neurological: No headache, tremors, numbness, one-sided weakness    Musculoskeletal: No cramps, myalgias, joint pain, joint swelling   Integument: No rash, edema        Personal History:      Past Medical History:   Diagnosis Date    Arthritis     COPD (chronic obstructive pulmonary disease)     Hypertension        History reviewed. No pertinent surgical history.    History reviewed. No pertinent family history.    Social History     Tobacco Use    Smoking status: Every Day     Packs/day: 0.50     Years: 50.00     Additional pack years: 0.00     Total pack years: 25.00     Types: Cigarettes    Smokeless tobacco: Never   Vaping Use    Vaping Use: Never used   Substance Use Topics    Alcohol use: Never    Drug use: Never         Home meds:  Prior to Admission medications    Medication Sig Start Date End Date Taking? Authorizing Provider   acyclovir (ZOVIRAX) 400 MG tablet Take 1 tablet by mouth 5 (Five) Times a Day. Take no more than 5 doses a day. 8/12/23   Dennis Woods MD   albuterol sulfate  (90 Base) MCG/ACT inhaler Inhale 2 puffs Every 4 (Four) Hours As Needed for Wheezing.    Pricila Hicks MD   amLODIPine (NORVASC) 5 MG tablet Take 1 tablet by mouth Daily.    Pricila Hicks MD   diphenhydrAMINE (BENADRYL) 2 % cream Apply 1 application  topically to the appropriate area as directed 3 (Three) Times a Day As Needed for Itching. 8/12/23   Dennis Woods MD   gabapentin (NEURONTIN) 600 MG tablet Take 1 tablet by mouth 3 (Three) Times a Day.    Pricila Hicks MD   ondansetron ODT (ZOFRAN-ODT) 4 MG disintegrating tablet Place 1 tablet on the tongue Every 8 (Eight) Hours As Needed for Nausea or Vomiting for up to 10 doses. 11/27/23   Sandi Iverson MD   triamcinolone (KENALOG) 0.1 % cream apply topically TO THE AFFECTED AREA(S) TWICE DAILY for eczema 11/14/23   Pricila Hicks MD       Allergies:     Patient has no known allergies.    Scheduled Meds:[START ON 12/26/2023] aspirin, 81 mg, Oral, Daily  atorvastatin, 40 mg, Oral, Nightly  [START ON 12/26/2023] metoprolol succinate XL, 25 mg, Oral, Q24H  [START ON 12/26/2023] ticagrelor, 90 mg, Oral, BID      Continuous Infusions:   PRN Meds:  acetaminophen    atropine    diphenhydrAMINE    HYDROcodone-acetaminophen    nitroglycerin    nitroglycerin    ondansetron ODT **OR** ondansetron    sodium chloride    sodium chloride      OBJECTIVE    Vital Signs  Vitals:    12/25/23 1944 12/25/23 1945 12/25/23 2000 12/25/23 2010   BP: 142/89 147/88 101/71    BP Location:       Patient Position:       Pulse: 90 91 89    Resp:       Temp:       TempSrc:       SpO2:  99% 97% 93%   Weight:       Height:           Flowsheet Rows      Flowsheet Row First Filed Value  "  Admission Height 185.4 cm (73\") Documented at 12/25/2023 1918   Admission Weight 60.2 kg (132 lb 11.5 oz) Documented at 12/25/2023 1918            No intake or output data in the 24 hours ending 12/25/23 2155     Telemetry: Sinus rhythm    Physical Exam:  The patient is alert, oriented and in mild distress.  Vital signs as noted above.  Head and neck revealed no carotid bruits or jugular venous distention.  No thyromegaly or lymphadenopathy is present  Lungs clear.  No wheezing.  Breath sounds are normal bilaterally.  Heart: Normal first and second heart sounds. No murmur.  No precordial rub is present.  No gallop is present.  Abdomen: Soft and nontender.  No organomegaly is present.  Extremities with good peripheral pulses without any pedal edema.  Skin: Warm and dry.  Musculoskeletal system is grossly normal.  CNS grossly normal.       Results Review:  I have personally reviewed the results from the time of this admission to 12/25/2023 21:55 EST and agree with these findings:  []  Laboratory  []  Microbiology  []  Radiology  []  EKG/Telemetry   []  Cardiology/Vascular   []  Pathology  []  Old records  []  Other:    Most notable findings include:     Lab Results (last 24 hours)       Procedure Component Value Units Date/Time    POC Activated Clotting Time [[id number]]  (Abnormal) Collected: 12/25/23 2117    Specimen: Blood Updated: 12/25/23 2123     Activated Clotting Time  271 Seconds      Comment: Serial Number: [id number]:  [id number]       POC Activated Clotting Time [[id number]]  (Abnormal) Collected: 12/25/23 2041    Specimen: Blood Updated: 12/25/23 2049     Activated Clotting Time  260 Seconds      Comment: Serial Number: [id number]:  [id number]       [place] Draw [[id number]] Collected: 12/25/23 1927    Specimen: Blood Updated: 12/25/23 2030    Narrative:      The following orders were created for panel order [place] Draw.  Procedure                               Abnormality         Status                   "   ---------                               -----------         ------                     Green Top (Gel)[[id number]]                                  Final result               Lavender Top[[id number]]                                     Final result               Gold Top - SST[[id number]]                                   Final result               Light Blue Top[[id number]]                                   Final result                 Please view results for these tests on the individual orders.    Gold Top - SST [[id number]] Collected: 12/25/23 1927    Specimen: Blood Updated: 12/25/23 2030     Extra Tube Hold for add-ons.     Comment: Auto resulted.       Lipid Panel [[id number]]  (Abnormal) Collected: 12/25/23 1927    Specimen: Blood Updated: 12/25/23 2019     Total Cholesterol 116 mg/dL      Triglycerides 68 mg/dL      HDL Cholesterol 32 mg/dL      LDL Cholesterol  70 mg/dL      VLDL Cholesterol 14 mg/dL      LDL/HDL Ratio 2.20    Narrative:      Cholesterol Reference Ranges  (U.S. Department of Health and Human Services ATP III Classifications)    Desirable          <200 mg/dL  Borderline High    200-239 mg/dL  High Risk          >240 mg/dL      Triglyceride Reference Ranges  (U.S. Department of Health and Human Services ATP III Classifications)    Normal           <150 mg/dL  Borderline High  150-199 mg/dL  High             200-499 mg/dL  Very High        >500 mg/dL    HDL Reference Ranges  (U.S. Department of Health and Human Services ATP III Classifications)    Low     <40 mg/dl (major risk factor for CHD)  High    >60 mg/dl ('negative' risk factor for CHD)        LDL Reference Ranges  (U.S. Department of Health and Human Services ATP III Classifications)    Optimal          <100 mg/dL  Near Optimal     100-129 mg/dL  Borderline High  130-159 mg/dL  High             160-189 mg/dL  Very High        >189 mg/dL    Light Blue Top [[id number]] Collected: 12/25/23 1927    Specimen: Blood Updated: 12/25/23 2014      Extra Tube Hold for add-ons.     Comment: Auto resulted       Hemoglobin A1c [[id number]] Collected: 12/25/23 1927    Specimen: Blood Updated: 12/25/23 2007    High Sensitivity Troponin T [[id number]]  (Normal) Collected: 12/25/23 1927    Specimen: Blood Updated: 12/25/23 2007     HS Troponin T 11 ng/L     Narrative:      High Sensitive Troponin T Reference Range:  <14.0 ng/L- Negative Female for AMI  <22.0 ng/L- Negative Male for AMI  >=14 - Abnormal Female indicating possible myocardial injury.  >=22 - Abnormal Male indicating possible myocardial injury.   Clinicians would have to utilize clinical acumen, EKG, Troponin, and serial changes to determine if it is an Acute Myocardial Infarction or myocardial injury due to an underlying chronic condition.         Comprehensive Metabolic Panel [[id number]]  (Abnormal) Collected: 12/25/23 1927    Specimen: Blood Updated: 12/25/23 2007     Glucose 100 mg/dL      BUN 7 mg/dL      Creatinine 0.61 mg/dL      Sodium 122 mmol/L      Potassium 4.3 mmol/L      Comment: Slight hemolysis detected by analyzer. Result may be falsely elevated.        Chloride 89 mmol/L      CO2 19.7 mmol/L      Calcium 9.0 mg/dL      Total Protein 7.2 g/dL      Albumin 3.5 g/dL      ALT (SGPT) 10 U/L      AST (SGOT) 29 U/L      Alkaline Phosphatase 89 U/L      Total Bilirubin 0.3 mg/dL      Globulin 3.7 gm/dL      A/G Ratio 0.9 g/dL      BUN/Creatinine Ratio 11.5     Anion Gap 13.3 mmol/L      eGFR 105.9 mL/min/1.73     Narrative:      GFR Normal >60  Chronic Kidney Disease <60  Kidney Failure <15      Lipase [[id number]]  (Normal) Collected: 12/25/23 1927    Specimen: Blood Updated: 12/25/23 2007     Lipase 22 U/L     Magnesium [[id number]]  (Normal) Collected: 12/25/23 1927    Specimen: Blood Updated: 12/25/23 2007     Magnesium 1.7 mg/dL     BNP [[id number]]  (Normal) Collected: 12/25/23 1927    Specimen: Blood Updated: 12/25/23 2003     proBNP 285.8 pg/mL     Narrative:      This assay is used as an  aid in the diagnosis of individuals suspected of having heart failure. It can be used as an aid in the diagnosis of acute decompensated heart failure (ADHF) in patients presenting with signs and symptoms of ADHF to the emergency department (ED). In addition, NT-proBNP of <300 pg/mL indicates ADHF is not likely.    Age Range Result Interpretation  NT-proBNP Concentration (pg/mL:      <50             Positive            >450                   Gray                 300-450                    Negative             <300    50-75           Positive            >900                  Gray                300-900                  Negative            <300      >75             Positive            >1800                  Gray                300-1800                  Negative            <300    Green Top (Gel) [[id number]] Collected: 12/25/23 1927    Specimen: Blood Updated: 12/25/23 1952     Extra Tube Hold for add-ons.     Comment: Auto resulted.       Lavender Top [[id number]] Collected: 12/25/23 1927    Specimen: Blood Updated: 12/25/23 1952     Extra Tube hold for add-on     Comment: Auto resulted       CBC & Differential [[id number]]  (Abnormal) Collected: 12/25/23 1927    Specimen: Blood Updated: 12/25/23 1942    Narrative:      The following orders were created for panel order CBC & Differential.  Procedure                               Abnormality         Status                     ---------                               -----------         ------                     CBC Auto Differential[[id number]]        Abnormal            Final result                 Please view results for these tests on the individual orders.    CBC Auto Differential [[id number]]  (Abnormal) Collected: 12/25/23 1927    Specimen: Blood Updated: 12/25/23 1942     WBC 5.17 10*3/mm3      RBC 4.20 10*6/mm3      Hemoglobin 13.3 g/dL      Hematocrit 38.4 %      MCV 91.4 fL      MCH 31.7 pg      MCHC 34.6 g/dL      RDW 13.2 %      RDW-SD 44.2 fl      MPV 8.2 fL       Platelets 321 10*3/mm3      Neutrophil % 61.5 %      Lymphocyte % 17.4 %      Monocyte % 16.4 %      Eosinophil % 3.9 %      Basophil % 0.6 %      Immature Grans % 0.2 %      Neutrophils, Absolute 3.18 10*3/mm3      Lymphocytes, Absolute 0.90 10*3/mm3      Monocytes, Absolute 0.85 10*3/mm3      Eosinophils, Absolute 0.20 10*3/mm3      Basophils, Absolute 0.03 10*3/mm3      Immature Grans, Absolute 0.01 10*3/mm3      nRBC 0.0 /100 WBC             Imaging Results (Last 24 Hours)       Procedure Component Value Units Date/Time    XR Chest 1 View [[id number]] Collected: 12/25/23 1953     Updated: 12/25/23 1956    Narrative:      PROCEDURE: XR CHEST 1 VW     COMPARISON: [facility], , CHEST AP/PA 1 VIEW, 4/15/2013, 7:42.     INDICATIONS: Chest Pain Triage Protocol     FINDINGS:   Several benign calcified granulomas are noted.  There is a consolidation in the right upper lung.    The heart and mediastinal contours appear normal.  The pulmonary vasculature appears normal.  The   osseous structures appear intact.       Impression:       Consolidation within right upper lung, concerning for pneumonia.  Recommend follow-up   CXR in 4-6 weeks to document resolution.                  BLAIR ROBERSON MD         Electronically Signed and Approved By: BLAIR ROBERSON MD on 12/25/2023 at 19:53                             LAB RESULTS (LAST 7 DAYS)    CBC  Results from last 7 days   Lab Units 12/25/23 1927   WBC 10*3/mm3 5.17   RBC 10*6/mm3 4.20   HEMOGLOBIN g/dL 13.3   HEMATOCRIT % 38.4   MCV fL 91.4   PLATELETS 10*3/mm3 321       BMP  Results from last 7 days   Lab Units 12/25/23 1927   SODIUM mmol/L 122*   POTASSIUM mmol/L 4.3   CHLORIDE mmol/L 89*   CO2 mmol/L 19.7*   BUN mg/dL 7*   CREATININE mg/dL 0.61*   GLUCOSE mg/dL 100*   MAGNESIUM mg/dL 1.7       CMP   Results from last 7 days   Lab Units 12/25/23 1927   SODIUM mmol/L 122*   POTASSIUM mmol/L 4.3   CHLORIDE mmol/L 89*   CO2 mmol/L 19.7*   BUN mg/dL 7*    CREATININE mg/dL 0.61*   GLUCOSE mg/dL 100*   ALBUMIN g/dL 3.5   BILIRUBIN mg/dL 0.3   ALK PHOS U/L 89   AST (SGOT) U/L 29   ALT (SGPT) U/L 10   LIPASE U/L 22       BNP        TROPONIN  Results from last 7 days   Lab Units 12/25/23 1927   HSTROP T ng/L 11       CoAg        Creatinine Clearance  Estimated Creatinine Clearance: 101.4 mL/min (A) (by C-G formula based on SCr of 0.61 mg/dL (L)).    ABG          Radiology  XR Chest 1 View    Result Date: 12/25/2023   Consolidation within right upper lung, concerning for pneumonia.  Recommend follow-up CXR in 4-6 weeks to document resolution.       BLAIR ROBERSON MD       Electronically Signed and Approved By: BLAIR ROBERSON MD on 12/25/2023 at 19:53                EKG  I personally viewed and interpreted the patient's EKG/Telemetry data:  ECG 12 Lead ED Triage Standing Order; Chest Pain   Preliminary Result   HEART RATE= 91  bpm   RR Interval= 660  ms   VA Interval= 170  ms   P Horizontal Axis= -29  deg   P Front Axis= 73  deg   QRSD Interval= 100  ms   QT Interval= 351  ms   QTcB= 432  ms   QRS Axis= 63  deg   T Wave Axis= 70  deg   - ABNORMAL ECG -   Sinus rhythm   Inferoposterior infarct, acute (LCx)   Anteroseptal infarct, age indeterminate   Electronically Signed By:    Date and Time of Study: 2023-12-25 19:22:07      ECG 12 Lead ED Triage Standing Order; Chest Pain    (Results Pending)         Echocardiogram:          Stress Test:        Cardiac Catheterization:  No results found for this or any previous visit.        Other:      ASSESSMENT & PLAN:    Principal Problem:    STEMI (ST elevation myocardial infarction)    STEMI  He was taken to the Cath Lab emergently.  Culprit vessel appeared to be left circumflex however there was also a mid LAD lesion.  Status post PCI to proximal left circumflex and mid LAD with drug-eluting stents.  Start dual antiplatelet therapy, high intensity statin and beta-blocker.  Obtain an echocardiogram  He will need staged  bifurcation PCI of mid left circumflex/OM preferably during this admission.  Cardiac rehab at the time of discharge.    Hypertension  Currently hypotensive  IV fluids as LVEDP was 0    Hyperlipidemia  LDL 70, HDL 32, triglyceride 68 and total cholesterol 116  Start high intensity statin    Hyponatremia  Closely monitor sodium levels currently 122  IV fluid resuscitation    Smoker  Lifelong smoker with underlying COPD  Smoking cessation counseling provided to the patient        Jeancarlos White MD  12/25/23  21:55 EST

## 2023-12-26 NOTE — H&P
[facility]   HOSPITALIST HISTORY AND PHYSICAL  Date: 2023   Patient Name: Hermelindo Zuñiga  : 1955  MRN: [id number]  Primary Care Physician:  Antony Florence MD  Date of admission: 2023    Subjective   Subjective     Chief Complaint: Chest pain    HPI:    Hermelindo Zuñiga is a 68 y.o. male with past medical history of hypertension, COPD, current active tobacco use presented to the ED for evaluation of substernal chest pain that started few hours ago prior to arrival to the ED.  Radiating to the left arm.  Associated with nausea, vomiting, mild shortness of breath.  Patient had a syncopal episode while he was in the ambulance.    In the ED, vital signs Temperature 98.1, pulse 83, respiratory 16, blood pressure 100/80 on 4 L of nasal cannula saturating around 94%.  Labs showed troponin 11, proBNP 285, sodium 122, chloride 89, bicarb 19.7, creatinine 0.61, rest of the CMP with no significant findings, lipid panel showed total cholesterol 116, LDL 70.  Lipase normal.  CBC within normal limits.  Chest x-ray showed consolidation in the right upper lobe concerning for pneumonia.  EKG showed ischemic changes concerning for STEMI.  STEMI alert was activated, cardiology was consulted.  Patient underwent cardiac cath with 2 THERON placed in proximal left circumflex and mid LAD.  Tolerated the procedure well and patient has been transferred to the ICU for further management.  Hospitalist service has been consulted for further management.  Discussed with Dr. White.  Patient will be scheduled to undergo staged bifurcation PCI of the mid left circumflex artery tomorrow by Dr. Barrera.  Started on dual antiplatelet therapy, metoprolol, atorvastatin.      Personal History     Past Medical History:   Diagnosis Date    Arthritis     COPD (chronic obstructive pulmonary disease)     Hypertension          History reviewed. No pertinent surgical history.      History reviewed. No pertinent family  history.      Social History     Socioeconomic History    Marital status:    Tobacco Use    Smoking status: Every Day     Packs/day: 0.50     Years: 50.00     Additional pack years: 0.00     Total pack years: 25.00     Types: Cigarettes    Smokeless tobacco: Never   Vaping Use    Vaping Use: Never used   Substance and Sexual Activity    Alcohol use: Never    Drug use: Never    Sexual activity: Defer         Home Medications:  acyclovir, albuterol sulfate HFA, amLODIPine, diphenhydrAMINE, gabapentin, lisinopril, ondansetron ODT, and triamcinolone    Allergies:  No Known Allergies    Review of Systems   All other systems reviewed and negative except as mentioned above in the HPI    Objective   Objective     Vitals:   Temp:  [98.1 °F (36.7 °C)] 98.1 °F (36.7 °C)  Heart Rate:  [68-91] 80  Resp:  [16] 16  BP: (100-171)/(71-90) 158/90  Flow (L/min):  [4] 4    Physical Exam    Constitutional: Awake, alert, no acute distress   Eyes: Pupils equal, sclerae anicteric, no conjunctival injection   HENT: NCAT, mucous membranes moist   Respiratory: Clear to auscultation bilaterally, nonlabored respirations    Cardiovascular: RRR, no murmurs, rubs, or gallops, catheter insertion site in the right groin is covered with dressing, no bleeding or hematoma noted    Gastrointestinal: Positive bowel sounds, soft, nontender, nondistended   Musculoskeletal: No bilateral ankle edema, no clubbing or cyanosis to extremities   Psychiatric: Appropriate affect, cooperative   Neurologic: Oriented x 3, speech clear   Skin: No rashes     Result Review    Result Review:  I have personally reviewed the results from the time of this admission to 12/26/2023 00:01 EST and agree with these findings:  [x]  Laboratory  []  Microbiology  [x]  Radiology  [x]  EKG/Telemetry   []  Cardiology/Vascular   []  Pathology  []  Old records  []  Other:        Imaging Results (Last 24 Hours)       Procedure Component Value Units Date/Time    XR Chest 1 View  [[id number]] Collected: 12/25/23 1953     Updated: 12/25/23 1956    Narrative:      PROCEDURE: XR CHEST 1 VW     COMPARISON: [facility], CR, CHEST AP/PA 1 VIEW, 4/15/2013, 7:42.     INDICATIONS: Chest Pain Triage Protocol     FINDINGS:   Several benign calcified granulomas are noted.  There is a consolidation in the right upper lung.    The heart and mediastinal contours appear normal.  The pulmonary vasculature appears normal.  The   osseous structures appear intact.       Impression:       Consolidation within right upper lung, concerning for pneumonia.  Recommend follow-up   CXR in 4-6 weeks to document resolution.                  BLAIR ROBERSON MD         Electronically Signed and Approved By: BLAIR ROBERSON MD on 12/25/2023 at 19:53                              amLODIPine, 10 mg, Oral, Daily  aspirin, 81 mg, Oral, Daily  atorvastatin, 40 mg, Oral, Nightly  azithromycin, 500 mg, Intravenous, Q24H  cefTRIAXone, 1,000 mg, Intravenous, Q24H  ipratropium-albuterol, 3 mL, Nebulization, 4x Daily - RT  metoprolol succinate XL, 25 mg, Oral, Q24H  sodium chloride, 500 mL/hr, Intravenous, Once  ticagrelor, 90 mg, Oral, BID         Assessment & Plan   Assessment / Plan     Assessment/Plan:   Chest pain  STEMI s/p THERON to proximal left circumflex and mid LAD POD 0  Right Upper lobe pneumonia  Moderate hyponatremia, clinically significant DDx likely due to dehydration  Hypertension  COPD  Current active tobacco use    Plan  Admit to ICU  Intensivist consult in the a.m.  Patient to undergo staged bifurcation PCI of mid left circumflex artery by Dr. Barrera.  Dr. Whtie will be updating Dr. Barrera  Continue aspirin, ticagrelor, atorvastatin, metoprolol  Bronchodilator protocol  DuoNebs every 6 hours as needed  Chest x-ray showed possible right upper lobe pneumonia.  Normal white count.  Denies any worsening cough/sputum production.  States that for the last 2 months he has been having low-grade fever  intermittently.  Procalcitonin elevated at 2.44.  Will start on ceftriaxone and azithromycin.  Follow-up on urine Legionella, urine strep, MRSA swab, blood cultures, sputum cultures, lactic acid.  Pulmonology consult in the a.m.  Moderate hyponatremia likely due to dehydration, Received 500 cc bolus Normal saline, another 500cc bolus now, monitor sodium with a.m. labs.  Based on the improvement in sodium levels, consider consulting nephrology in the a.m. for further management of hyponatremia.  Follow-up on urine sodium, urine osmolality, urine creatinine, serum osmolality  Amlodipine   Resume home lisinopril if the BP is >140 systolic in the AM   N.p.o. after midnight for possible staged PCI in the morning  Resume other appropriate home medications once reconciled  POCT glucose every 6 hours    DVT prophylaxis:  No DVT prophylaxis order currently exists.    CODE STATUS:    Level Of Support Discussed With: Patient  Code Status (Patient has no pulse and is not breathing): CPR (Attempt to Resuscitate)  Medical Interventions (Patient has pulse or is breathing): Full Support      Admission Status:  I believe this patient meets inpatient status.    Part of this note may be an electronic transcription/translation of spoken language to printed text using the Dragon Dictation System    Monique Tomlinson MD

## 2023-12-27 LAB
ANION GAP SERPL CALCULATED.3IONS-SCNC: 10.9 MMOL/L (ref 5–15)
ANION GAP SERPL CALCULATED.3IONS-SCNC: 12.8 MMOL/L (ref 5–15)
ANION GAP SERPL CALCULATED.3IONS-SCNC: 14.3 MMOL/L (ref 5–15)
ANION GAP SERPL CALCULATED.3IONS-SCNC: 9.3 MMOL/L (ref 5–15)
BACTERIA SPEC AEROBE CULT: ABNORMAL
BUN SERPL-MCNC: 5 MG/DL (ref 8–23)
BUN SERPL-MCNC: 7 MG/DL (ref 8–23)
BUN SERPL-MCNC: 7 MG/DL (ref 8–23)
BUN SERPL-MCNC: 9 MG/DL (ref 8–23)
BUN/CREAT SERPL: 12.1 (ref 7–25)
BUN/CREAT SERPL: 13.6 (ref 7–25)
BUN/CREAT SERPL: 15.2 (ref 7–25)
BUN/CREAT SERPL: 9.1 (ref 7–25)
CALCIUM SPEC-SCNC: 8.6 MG/DL (ref 8.6–10.5)
CALCIUM SPEC-SCNC: 8.9 MG/DL (ref 8.6–10.5)
CALCIUM SPEC-SCNC: 8.9 MG/DL (ref 8.6–10.5)
CALCIUM SPEC-SCNC: 9 MG/DL (ref 8.6–10.5)
CHLORIDE SERPL-SCNC: 89 MMOL/L (ref 98–107)
CHLORIDE SERPL-SCNC: 90 MMOL/L (ref 98–107)
CHLORIDE SERPL-SCNC: 91 MMOL/L (ref 98–107)
CHLORIDE SERPL-SCNC: 92 MMOL/L (ref 98–107)
CO2 SERPL-SCNC: 20.2 MMOL/L (ref 22–29)
CO2 SERPL-SCNC: 22.7 MMOL/L (ref 22–29)
CO2 SERPL-SCNC: 24.7 MMOL/L (ref 22–29)
CO2 SERPL-SCNC: 25.1 MMOL/L (ref 22–29)
CREAT SERPL-MCNC: 0.46 MG/DL (ref 0.76–1.27)
CREAT SERPL-MCNC: 0.55 MG/DL (ref 0.76–1.27)
CREAT SERPL-MCNC: 0.58 MG/DL (ref 0.76–1.27)
CREAT SERPL-MCNC: 0.66 MG/DL (ref 0.76–1.27)
DEPRECATED RDW RBC AUTO: 45.6 FL (ref 37–54)
EGFRCR SERPLBLD CKD-EPI 2021: 103.4 ML/MIN/1.73
EGFRCR SERPLBLD CKD-EPI 2021: 107.6 ML/MIN/1.73
EGFRCR SERPLBLD CKD-EPI 2021: 109.3 ML/MIN/1.73
EGFRCR SERPLBLD CKD-EPI 2021: 115.4 ML/MIN/1.73
ERYTHROCYTE [DISTWIDTH] IN BLOOD BY AUTOMATED COUNT: 13.2 % (ref 12.3–15.4)
GLUCOSE SERPL-MCNC: 102 MG/DL (ref 65–99)
GLUCOSE SERPL-MCNC: 80 MG/DL (ref 65–99)
GLUCOSE SERPL-MCNC: 83 MG/DL (ref 65–99)
GLUCOSE SERPL-MCNC: 89 MG/DL (ref 65–99)
GRAM STN SPEC: ABNORMAL
HCT VFR BLD AUTO: 39 % (ref 37.5–51)
HGB BLD-MCNC: 13.3 G/DL (ref 13–17.7)
ISOLATED FROM: ABNORMAL
MAGNESIUM SERPL-MCNC: 1.9 MG/DL (ref 1.6–2.4)
MCH RBC QN AUTO: 31.6 PG (ref 26.6–33)
MCHC RBC AUTO-ENTMCNC: 34.1 G/DL (ref 31.5–35.7)
MCV RBC AUTO: 92.6 FL (ref 79–97)
PHOSPHATE SERPL-MCNC: 3.3 MG/DL (ref 2.5–4.5)
PLATELET # BLD AUTO: 331 10*3/MM3 (ref 140–450)
PMV BLD AUTO: 8.1 FL (ref 6–12)
POTASSIUM SERPL-SCNC: 3.7 MMOL/L (ref 3.5–5.2)
POTASSIUM SERPL-SCNC: 3.9 MMOL/L (ref 3.5–5.2)
POTASSIUM SERPL-SCNC: 3.9 MMOL/L (ref 3.5–5.2)
POTASSIUM SERPL-SCNC: 4 MMOL/L (ref 3.5–5.2)
RBC # BLD AUTO: 4.21 10*6/MM3 (ref 4.14–5.8)
SODIUM SERPL-SCNC: 122 MMOL/L (ref 136–145)
SODIUM SERPL-SCNC: 126 MMOL/L (ref 136–145)
SODIUM SERPL-SCNC: 126 MMOL/L (ref 136–145)
SODIUM SERPL-SCNC: 128 MMOL/L (ref 136–145)
WBC NRBC COR # BLD AUTO: 5.75 10*3/MM3 (ref 3.4–10.8)
WHOLE BLOOD HOLD SPECIMEN: NORMAL

## 2023-12-27 PROCEDURE — 85027 COMPLETE CBC AUTOMATED: CPT | Performed by: FAMILY MEDICINE

## 2023-12-27 PROCEDURE — 83735 ASSAY OF MAGNESIUM: CPT | Performed by: FAMILY MEDICINE

## 2023-12-27 PROCEDURE — 99233 SBSQ HOSP IP/OBS HIGH 50: CPT | Performed by: INTERNAL MEDICINE

## 2023-12-27 PROCEDURE — 84100 ASSAY OF PHOSPHORUS: CPT | Performed by: FAMILY MEDICINE

## 2023-12-27 PROCEDURE — 25010000002 AZITHROMYCIN PER 500 MG: Performed by: STUDENT IN AN ORGANIZED HEALTH CARE EDUCATION/TRAINING PROGRAM

## 2023-12-27 PROCEDURE — 87305 ASPERGILLUS AG IA: CPT | Performed by: INTERNAL MEDICINE

## 2023-12-27 PROCEDURE — 25010000002 FUROSEMIDE PER 20 MG: Performed by: INTERNAL MEDICINE

## 2023-12-27 PROCEDURE — 86698 HISTOPLASMA ANTIBODY: CPT | Performed by: INTERNAL MEDICINE

## 2023-12-27 PROCEDURE — 87449 NOS EACH ORGANISM AG IA: CPT | Performed by: INTERNAL MEDICINE

## 2023-12-27 PROCEDURE — 25010000002 AMPICILLIN-SULBACTAM PER 1.5 G: Performed by: INTERNAL MEDICINE

## 2023-12-27 PROCEDURE — 25010000002 CEFTRIAXONE PER 250 MG: Performed by: STUDENT IN AN ORGANIZED HEALTH CARE EDUCATION/TRAINING PROGRAM

## 2023-12-27 PROCEDURE — 99232 SBSQ HOSP IP/OBS MODERATE 35: CPT | Performed by: INTERNAL MEDICINE

## 2023-12-27 PROCEDURE — 80048 BASIC METABOLIC PNL TOTAL CA: CPT | Performed by: FAMILY MEDICINE

## 2023-12-27 PROCEDURE — 86606 ASPERGILLUS ANTIBODY: CPT | Performed by: INTERNAL MEDICINE

## 2023-12-27 PROCEDURE — 25810000003 SODIUM CHLORIDE 0.9 % SOLUTION: Performed by: STUDENT IN AN ORGANIZED HEALTH CARE EDUCATION/TRAINING PROGRAM

## 2023-12-27 PROCEDURE — 87385 HISTOPLASMA CAPSUL AG IA: CPT | Performed by: INTERNAL MEDICINE

## 2023-12-27 RX ORDER — FUROSEMIDE 10 MG/ML
40 INJECTION INTRAMUSCULAR; INTRAVENOUS ONCE
Status: COMPLETED | OUTPATIENT
Start: 2023-12-27 | End: 2023-12-27

## 2023-12-27 RX ADMIN — AMPICILLIN AND SULBACTAM 3 G: 2; 1 INJECTION, POWDER, FOR SOLUTION INTRAMUSCULAR; INTRAVENOUS at 08:25

## 2023-12-27 RX ADMIN — HYDROCODONE BITARTRATE AND ACETAMINOPHEN 1 TABLET: 5; 325 TABLET ORAL at 13:49

## 2023-12-27 RX ADMIN — AZITHROMYCIN 500 MG: 500 INJECTION, POWDER, LYOPHILIZED, FOR SOLUTION INTRAVENOUS at 01:27

## 2023-12-27 RX ADMIN — AMPICILLIN AND SULBACTAM 3 G: 2; 1 INJECTION, POWDER, FOR SOLUTION INTRAMUSCULAR; INTRAVENOUS at 22:17

## 2023-12-27 RX ADMIN — TICAGRELOR 90 MG: 90 TABLET ORAL at 08:24

## 2023-12-27 RX ADMIN — ATORVASTATIN CALCIUM 40 MG: 40 TABLET, FILM COATED ORAL at 21:12

## 2023-12-27 RX ADMIN — TICAGRELOR 90 MG: 90 TABLET ORAL at 21:14

## 2023-12-27 RX ADMIN — GABAPENTIN 600 MG: 300 CAPSULE ORAL at 21:12

## 2023-12-27 RX ADMIN — GABAPENTIN 600 MG: 300 CAPSULE ORAL at 05:19

## 2023-12-27 RX ADMIN — ASPIRIN 81 MG: 81 TABLET, COATED ORAL at 08:23

## 2023-12-27 RX ADMIN — AMPICILLIN AND SULBACTAM 3 G: 2; 1 INJECTION, POWDER, FOR SOLUTION INTRAMUSCULAR; INTRAVENOUS at 13:49

## 2023-12-27 RX ADMIN — HYDROCODONE BITARTRATE AND ACETAMINOPHEN 1 TABLET: 5; 325 TABLET ORAL at 19:53

## 2023-12-27 RX ADMIN — FUROSEMIDE 40 MG: 10 INJECTION, SOLUTION INTRAMUSCULAR; INTRAVENOUS at 08:24

## 2023-12-27 RX ADMIN — GABAPENTIN 600 MG: 300 CAPSULE ORAL at 13:50

## 2023-12-27 RX ADMIN — CEFTRIAXONE SODIUM 1000 MG: 1 INJECTION, SOLUTION INTRAVENOUS at 00:41

## 2023-12-27 NOTE — PROGRESS NOTES
[facility]   Hospitalist Progress Note  Date: 2023  Patient Name: Hermelindo Zuñiga  : 1955  MRN: [id number]  Date of admission: 2023      Subjective   Subjective     Chief Complaint: Follow-up chest pain    Summary:Hermelindo Zuñiga is a 68 y.o. male with past medical history of hypertension, COPD, current active tobacco use presented to the ED for evaluation of substernal chest pain that started few hours ago prior to arrival to the ED. pain radiating to the left arm and was associated with nausea, vomiting, mild shortness of breath.  Patient had a syncopal episode while he was in the ambulance. In the ED, patient afebrile, heart rate within normal limits, respiratory rate within normal limits, blood pressure borderline low, requiring 4 L nasal cannula keep sats greater than 90%.  Serum sodium found to be low at 122.  EKG positive for ischemic changes concerning for STEMI. STEMI alert was activated, cardiology consulted.  Patient underwent cardiac cath with 2 THERON placed in proximal left circumflex and mid LAD.  Patient noted to have residual distal left circumflex stenosis 70-75% range.  Cardiology planning on further intervention as an outpatient following discharge.  Tolerated the procedure well and patient transferred to the ICU for further management.  Hospitalist service has been consulted for further management. Started on dual antiplatelet therapy, metoprolol, atorvastatin.  Patient started on empiric antibiotics for possible pneumonia.  CT of the chest performed which demonstrated large mass in the right upper lobe.  Further workup per pulmonology.  Started on gentle IV fluids.  Following BMPs every 6 hours.  1 of 2 initial blood cultures positive for strep species suspect contaminant.  Repeat blood culture pending.    Interval Followup: Patient sitting up in bed appears to be resting comfortable.  Patient denies any further chest pain denies shortness of breath denies  productive cough.  Patient did state that he has been more fatigued and short of breath over the past month.  Afebrile overnight.  Sinus rhythm PACs PVCs  on telemetry review.    Review of Systems  Constitutional: Positive for fatigue and negative fever.   HENT: Negative for sore throat and trouble swallowing.    Eyes: Negative for pain and discharge.   Respiratory: Negative for cough and shortness of breath.    Cardiovascular: Negative for chest pain and palpitations.   Gastrointestinal: Negative for abdominal pain, nausea and vomiting.   Endocrine: Negative for cold intolerance and heat intolerance.   Genitourinary: Negative for difficulty urinating and dysuria.   Musculoskeletal: Negative for back pain and neck stiffness.   Skin: Negative for color change and rash.   Neurological: Negative for syncope and headaches.   Hematological: Negative for adenopathy.   Psychiatric/Behavioral: Negative for confusion and hallucinations.    Objective   Objective     Vitals:   Temp:  [97.4 °F (36.3 °C)-97.8 °F (36.6 °C)] 97.5 °F (36.4 °C)  Heart Rate:  [68-93] 82  Resp:  [13-18] 18  BP: (108-190)/() 119/56  Flow (L/min):  [4] 4  Physical Exam   General: well-developed appearing stated age thin frail appearing in no acute distress  HEENT: Normocephalic atraumatic moist membranes pupils equal round reactive light, no scleral icterus no conjunctival injection  Cardiovascular: regular rate and rhythm no murmurs rubs or gallops S1-S2, no lower extremity edema appreciated  Pulmonary: Diminished right upper lung field no wheezes rales or rhonchi symmetric chest expansion, unlabored, no conversational dyspnea appreciated  Gastrointestinal: Soft nontender nondistended positive bowel sounds all 4 quadrants no rebound or guarding  Musculoskeletal: No clubbing cyanosis, warm and well-perfused, calves soft symmetric nontender bilaterally  Skin: Clean dry without rashes  Neuro: Cranial nerves II through XII intact grossly no  sensorimotor deficits appreciated bilateral upper and lower extremities  Psych: Patient is calm cooperative and appropriate with exam not responding to internal stimuli  : No Nye catheter no bladder distention no suprapubic tenderness    Result Review    Result Review:  I have personally reviewed these results and agree with these findings:  [x]  Laboratory  LAB RESULTS:      Lab 12/26/23  0159 12/26/23  0046 12/25/23 2152 12/25/23 1927   WBC 4.53  --   --  5.17   HEMOGLOBIN 13.8  --   --  13.3   HEMATOCRIT 39.7  --   --  38.4   PLATELETS 311  --   --  321   NEUTROS ABS 3.04  --   --  3.18   IMMATURE GRANS (ABS) 0.03  --   --  0.01   LYMPHS ABS 0.73  --   --  0.90   MONOS ABS 0.65  --   --  0.85   EOS ABS 0.06  --   --  0.20   MCV 91.5  --   --  91.4   PROCALCITONIN  --   --  2.44*  --    LACTATE  --  1.0  --   --          Lab 12/26/23 0159 12/25/23 1927   SODIUM 120* 122*   POTASSIUM 4.2 4.3   CHLORIDE 89* 89*   CO2 18.8* 19.7*   ANION GAP 12.2 13.3   BUN 8 7*   CREATININE 0.53* 0.61*   EGFR 110.5 105.9   GLUCOSE 93 100*   CALCIUM 8.8 9.0   MAGNESIUM 1.7 1.7   PHOSPHORUS 3.6  --    HEMOGLOBIN A1C  --  5.40         Lab 12/26/23  0159 12/25/23 1927   TOTAL PROTEIN 7.1 7.2   ALBUMIN 3.5 3.5   GLOBULIN 3.6 3.7   ALT (SGPT) 10 10   AST (SGOT) 26 29   BILIRUBIN 0.4 0.3   ALK PHOS 88 89   LIPASE  --  22         Lab 12/25/23 2152 12/25/23 1927   PROBNP  --  285.8   HSTROP T 11 11         Lab 12/25/23 1927   CHOLESTEROL 116   LDL CHOL 70   HDL CHOL 32*   TRIGLYCERIDES 68             Brief Urine Lab Results  (Last result in the past 365 days)        Color   Clarity   Blood   Leuk Est   Nitrite   Protein   CREAT   Urine HCG        12/26/23 0159             54.0               Microbiology Results (last 10 days)       Procedure Component Value - Date/Time    S. Pneumo Ag Urine or CSF - Urine, Urine, Clean Catch [[id number]]  (Normal) Collected: 12/26/23 0159    Lab Status: Final result Specimen: Urine, Clean Catch  Updated: 12/26/23 0230     Strep Pneumo Ag Negative    Legionella Antigen, Urine - Urine, Urine, Clean Catch [[id number]]  (Normal) Collected: 12/26/23 0159    Lab Status: Final result Specimen: Urine, Clean Catch Updated: 12/26/23 0229     LEGIONELLA ANTIGEN, URINE Negative    Respiratory Culture - Sputum, Cough [[id number]] Collected: 12/26/23 0159    Lab Status: Final result Specimen: Sputum from Cough Updated: 12/26/23 0307     Respiratory Culture Rejected     Gram Stain Moderate (3+) WBCs seen      Few (2+) Epithelial cells seen     Comment: Modified report. Previous result was Moderate (3+) Epithelial cells seen on 12/26/2023 at 0303 EST.         Moderate (3+) Gram positive cocci in pairs, chains and clusters    Narrative:      Specimen rejected due to oropharyngeal contamination. Please reorder and recollect specimen if clinically necessary.    Blood Culture - Blood, Arm, Right [[id number]]  (Abnormal) Collected: 12/26/23 0130    Lab Status: Preliminary result Specimen: Blood from Arm, Right Updated: 12/26/23 1726     Blood Culture Abnormal Stain     Gram Stain Anaerobic Bottle Gram positive cocci in chains    Blood Culture ID, PCR - Blood, Arm, Right [[id number]]  (Abnormal) Collected: 12/26/23 0130    Lab Status: Final result Specimen: Blood from Arm, Right Updated: 12/26/23 1850     BCID, PCR Streptococcus spp, not A, B, or pneumoniae. Identification by BCID2 PCR.     BOTTLE TYPE Anaerobic Bottle    MRSA Screen, PCR (Inpatient) - Swab, Nares [[id number]]  (Normal) Collected: 12/26/23 0033    Lab Status: Final result Specimen: Swab from Nares Updated: 12/26/23 0158     MRSA PCR No MRSA Detected    Narrative:      The negative predictive value of this diagnostic test is high and should only be used to consider de-escalating anti-MRSA therapy. A positive result may indicate colonization with MRSA and must be correlated clinically.            [x]  Microbiology  [x]  Radiology  CT Chest Without Contrast  Diagnostic    Result Date: 12/26/2023    1. There is a large mass that has developed in the right upper lobe.  There are calcifications within the mass.  While it is possible this could relate to granulomatous exposure or possibly sarcoid, a malignancy could not be excluded. 2. Calcified mediastinal and right hilar lymph nodes are suggested. 3. Calcification in what looks like probable soft tissue mass right upper quadrant abdomen 4. Bibasilar effusions 5. Emphysematous changes most marked upper lobes 6. Coronary artery calcification.  FNA of mass would be recommended.    LUKE MILLER MD       Electronically Signed and Approved By: LUKE MILLER MD on 12/26/2023 at 16:53             Cardiac Catheterization/Vascular Study    Addendum Date: 12/26/2023    OPERATORS Jeancarlos White M.D. (Attending Cardiologist)  PROCEDURES PERFORMED Ultrasound guided Vascular access Left Heart Catheterization Coronary Angiogram PCI of left circumflex artery IVUS of left circumflex artery PCI of mid LAD IVUS of LAD Moderate sedation  INDICATIONS FOR PROCEDURE 68 years old man with multiple cardiovascular risk factors presented to the hospital with chest pain and dynamic ECG changes in the anterolateral leads.  After receiving nitroglycerin he became severely hypotensive into 70s systolic.  After emergent discussion of risk and benefit of the procedure he was brought to the Cath Lab for definitive coronary angiography.  PROCEDURE IN DETAIL Informed consent was obtained from the patient after explaining the risks, benefits, and alternative options of the procedure. After obtaining informed consent, the patient was brought to the cath lab and was prepped in a sterile fashion. Lidocaine 2% was used for local anesthesia into the right femoral access site. The right femoral artery was accessed with a micropuncture needle via modified Seldinger technique under ultrasound guidance. A 6F was inserted successfully. Afterwards, 6F JR4 and JL5  diagnostic catheters were advanced over a wire into the ascending aorta and were used to engage the ostia of the left main and RCA respectively. JR4 used to cross the AV and obtain LV pressures and gradient across the AV measured via pullback technique. Images of the right and left coronary systems were obtained.  HEMODYNAMICS  LV: 115/0, 3 mmHg AO: 119/69, 89 mmHg No significant gradient across the aortic valve during pullback of JR4 catheter.  LV gram was not performed during the study.  FINDINGS Coronary Angiogram Right dominant circulation Left main: Left main is a large caliber vessel which gives rise to the Left Anterior Descending and the Left circumflex.  Ostium of the left main has 20% stenosis. Left Anterior Descending Artery: LAD is a medium caliber vessel which gives rise to several septal perforators and several diagonal branches.  Mid LAD has 80% tubular stenosis.  JANELL-3 flow Left Circumflex: Left circumflex artery gives rise to obtuse marginals.  Proximal left circumflex has tubular 80% hazy appearing stenosis.  Mid segment of left circumflex at bifurcation with OM has a complex 70% stenosis in both segments.  JANELL-3 flow Right Coronary Artery: The RCA is a medium caliber vessel gives rise to PDA and PLV.  Mid RCA has diffuse 40% stenosis. Percutaneous coronary intervention Diagnostic coronary angiography suggested both left circumflex and LAD to be contributed to ischemia and symptoms.  Left circumflex being the worst of the 2 vessels. 100 units/kg of heparin was administered and ACT of more than 250 was documented. A 6 B[place]udian XB 4.0 guide catheter was used to engage the ostium of the left main coronary artery. 0.014 run-through wire was then advanced into the left circumflex artery. I then advanced intravascular ultrasound catheter into the proximal segment of left circumflex. IVUS showed proximal left circumflex to be 3 mm and mid left circumflex to be 3.5 mm in dimension.  IVUS also showed  calcification with thrombus in the proximal left circumflex. The lesion in the proximal left circumflex was predilated with 2.5 x 12 mm compliant balloon at 12 lisa. This was followed by placement of 3 x 15 mm Xience magi point stent which was then postdilated with 3.25 x 12 mm noncompliant balloon at 14 lisa. I then noted a new 50% stenosis at the ostium of left circumflex artery which was treated with balloon angioplasty by a 3 x 12 mm compliant balloon at 12 lisa. Final angiography showed 20-30% stenosis at the ostium of left circumflex artery.  JANELL-3 flow in the left circumflex artery and OM.  I did not treat mid left circumflex vessel as it was a complex bifurcating lesion which would have required more contrast and time while the patient was hypotensive receiving pressors and IV fluid. I then quickly diverted my attention to the LAD which was wired with 0.014 run-through wire. Intravascular ultrasound catheter was then advanced into the mid LAD and a slow manual pullback was performed. IVUS showed LAD size to be 3.5 mm.  IVUS also confirmed 80% calcific stenosis in the mid LAD. I then predilated this lesion with 3.25 x 12 mm noncompliant balloon at 12 lisa. This was followed by placement of 3.25 x 33 mm Xience magi point stent. The stent was postdilated with 3.5 x 12 mm noncompliant balloon at 16 lisa. Final angiography showed 0% stenosis in LAD and JANELL-3 flow. All the catheters were exchanged over a wire and subsequently removed.  6 German introducer sheath was left in place to be pulled manually at a later time when ACT is less than 150  ESTIMATED BLOOD LOSS: 10 ml  COMPLICATIONS: None  IMPRESSIONS PCI of proximal left circumflex and mid LAD. Low LVEDP  RECOMMENDATIONS -Start dual antiplatelet therapy, high intensity statin and beta-blocker -Staged bifurcation PCI of mid left circumflex artery/OM -Smoking cessation -Obtain an echocardiogram -Cardiac rehab at the time of discharge. Electronically signed by Jeancarlos  MD Christopher, 12/25/23, 9:52 PM EST.       Addendum Date: 12/26/2023    OPERATORS Jeancarlos White M.D. (Attending Cardiologist)  PROCEDURES PERFORMED Ultrasound guided Vascular access Left Heart Catheterization Coronary Angiogram PCI of left circumflex artery IVUS of left circumflex artery PCI of mid LAD IVUS of LAD Moderate sedation  INDICATIONS FOR PROCEDURE 68 years old man with multiple cardiovascular risk factors presented to the hospital with chest pain and dynamic ECG changes in the anterolateral leads.  After receiving nitroglycerin he became severely hypotensive into 70s systolic.  After emergent discussion of risk and benefit of the procedure he was brought to the Cath Lab for definitive coronary angiography.  PROCEDURE IN DETAIL Informed consent was obtained from the patient after explaining the risks, benefits, and alternative options of the procedure. After obtaining informed consent, the patient was brought to the cath lab and was prepped in a sterile fashion. Lidocaine 2% was used for local anesthesia into the right femoral access site. The right femoral artery was accessed with a micropuncture needle via modified Seldinger technique under ultrasound guidance. A 6F was inserted successfully. Afterwards, 6F JR4 and JL5 diagnostic catheters were advanced over a wire into the ascending aorta and were used to engage the ostia of the left main and RCA respectively. JR4 used to cross the AV and obtain LV pressures and gradient across the AV measured via pullback technique. Images of the right and left coronary systems were obtained.  HEMODYNAMICS  LV: 115/0, 3 mmHg AO: 119/69, 89 mmHg No significant gradient across the aortic valve during pullback of JR4 catheter.  LV gram was not performed during the study.  FINDINGS Coronary Angiogram Right dominant circulation Left main: Left main is a large caliber vessel which gives rise to the Left Anterior Descending and the Left circumflex.  Ostium of the left main has  20% stenosis. Left Anterior Descending Artery: LAD is a medium caliber vessel which gives rise to several septal perforators and several diagonal branches.  Mid LAD has 80% tubular stenosis.  JANELL-3 flow Left Circumflex: Left circumflex artery gives rise to obtuse marginals.  Proximal left circumflex has tubular 80% hazy appearing stenosis.  Mid segment of left circumflex at bifurcation with OM has a complex 70% stenosis in both segments.  JANELL-3 flow Right Coronary Artery: The RCA is a medium caliber vessel gives rise to PDA and PLV.  Mid RCA has diffuse 40% stenosis. Percutaneous coronary intervention Diagnostic coronary angiography suggested both left circumflex and LAD to be contributed to ischemia and symptoms.  Left circumflex being the worst of the 2 vessels. 100 units/kg of heparin was administered and ACT of more than 250 was documented. 0.014 run-through wire was then advanced into the left circumflex artery. I then advanced intravascular ultrasound catheter into the proximal segment of left circumflex. IVUS showed proximal left circumflex to be 3 mm and mid left circumflex to be 3.5 mm in dimension.  IVUS also showed calcification with thrombus in the proximal left circumflex. The lesion in the proximal left circumflex was predilated with 2.5 x 12 mm compliant balloon at 12 lisa. This was followed by placement of 3 x 15 mm Xience magi point stent which was then postdilated with 3.25 x 12 mm noncompliant balloon at 14 lisa. I then noted a new 50% stenosis at the ostium of left circumflex artery which was treated with balloon angioplasty by a 3 x 12 mm compliant balloon at 12 lisa. Final angiography showed 20-30% stenosis at the ostium of left circumflex artery.  JANELL-3 flow in the left circumflex artery and OM.  I did not treat mid left circumflex vessel as it was a complex bifurcating lesion which would have required more contrast and time while the patient was hypotensive receiving pressors and IV fluid. I  then quickly diverted my attention to the LAD which was wired with 0.014 run-through wire. Intravascular ultrasound catheter was then advanced into the mid LAD and a slow manual pullback was performed. IVUS showed LAD size to be 3.5 mm.  IVUS also confirmed 80% calcific stenosis in the mid LAD. I then predilated this lesion with 3.25 x 12 mm noncompliant balloon at 12 lisa. This was followed by placement of 3.25 x 33 mm Xience magi point stent. The stent was postdilated with 3.5 x 12 mm noncompliant balloon at 16 lisa. Final angiography showed 0% stenosis in LAD and JANELL-3 flow. All the catheters were exchanged over a wire and subsequently removed.  6 Turkish introducer sheath was left in place to be pulled manually at a later time when ACT is less than 150  ESTIMATED BLOOD LOSS: 10 ml  COMPLICATIONS: None  IMPRESSIONS PCI of proximal left circumflex and mid LAD. Low LVEDP  RECOMMENDATIONS -Start dual antiplatelet therapy, high intensity statin and beta-blocker -Staged bifurcation PCI of mid left circumflex artery/OM -Smoking cessation -Obtain an echocardiogram -Cardiac rehab at the time of discharge. Electronically signed by Jeancarlos White MD, 12/25/23, 9:52 PM EST.       XR Chest 1 View    Result Date: 12/25/2023   Consolidation within right upper lung, concerning for pneumonia.  Recommend follow-up CXR in 4-6 weeks to document resolution.       BLAIR ROBERSON MD       Electronically Signed and Approved By: BLAIR ROBERSON MD on 12/25/2023 at 19:53              [x]  EKG/Telemetry   [x]  Cardiology/Vascular   Echocardiogram 12/26/2023    Left ventricular ejection fraction appears to be 51 - 55%.    Left ventricular diastolic function is consistent with (grade I) impaired relaxation and age.    Estimated right ventricular systolic pressure from tricuspid regurgitation is moderately elevated (45-55 mmHg).    Mild to moderate tricuspid regurgitation.  []  Pathology  []  Old records  [x]  Other:  Scheduled  Meds:amLODIPine, 10 mg, Oral, Daily  aspirin, 81 mg, Oral, Daily  atorvastatin, 40 mg, Oral, Nightly  azithromycin, 500 mg, Intravenous, Q24H  cefTRIAXone, 1,000 mg, Intravenous, Q24H  gabapentin, 600 mg, Oral, Q8H  lisinopril, 10 mg, Oral, Q24H  metoprolol succinate XL, 25 mg, Oral, Q24H  ticagrelor, 90 mg, Oral, BID      Continuous Infusions:sodium chloride, 50 mL/hr      PRN Meds:.  acetaminophen    atropine    diphenhydrAMINE    HYDROcodone-acetaminophen    ipratropium-albuterol    nicotine    nitroglycerin    nitroglycerin    ondansetron ODT **OR** ondansetron    sodium chloride    sodium chloride      Assessment & Plan   Assessment / Plan     Assessment/Plan:  Chest pain  STEMI s/p THERON to proximal left circumflex and mid LAD   Coronary artery disease with residual distal left circumflex stenosis of 70 to 75%.  Right Upper lobe lung mass concerning for malignancy  Rule out pneumonia  Suspected blood culture contaminant  Moderate hyponatremia, clinically significant DDx likely due to dehydration and concern for possible SIADH due to pulmonary malignancy  Hypertension  COPD without acute exacerbation  Current active tobacco use  Severe protein calorie malnutrition        Patient admitted for further evaluation and treatment  Pulmonology critical care and cardiology consulted thank you for your assistance  Status post PCI as above  Continue aspirin Alimta and atorvastatin  Continue metoprolol lisinopril  Plan for outpatient follow-up with cardiology for further intervention on residual distal left circumflex stenosis  Further workup of right upper lobe lung mass per pulmonology  Continue Rocephin azithromycin plan to discuss with pulmonology de-escalation tomorrow  Repeat blood culture pending though suspect contaminant based on current speciation 1 out of 2 bottles  Start normal saline at 50 MLS per hour and monitor serum sodium every 6 hours with goal rise in serum sodium no more than 8 mmol/L per 24  hours  Continue amlodipine  Continue as needed DuoNebs  Patient counseled the importance of tobacco cessation  Will provide supplemental nicotine as needed  Start protein supplementation with meals  Further inpatient orders recommendations pending clinical course         Discussed plan with bedside RN as well as pulmonology critical care team.    Disposition: Home once cleared by pulmonology and cardiology.    DVT prophylaxis:  Mechanical DVT prophylaxis orders are present.    CODE STATUS:   Level Of Support Discussed With: Patient  Code Status (Patient has no pulse and is not breathing): CPR (Attempt to Resuscitate)  Medical Interventions (Patient has pulse or is breathing): Full Support

## 2023-12-27 NOTE — PROGRESS NOTES
[facility]   Hospitalist Progress Note  Date: 2023  Patient Name: Hermelindo Zuñiga  : 1955  MRN: [id number]  Date of admission: 2023      Subjective   Subjective     Chief Complaint: Follow-up chest pain    Summary:Hermelindo Zuñiga is a 68 y.o. male with past medical history of hypertension, COPD, current active tobacco use presented to the ED for evaluation of substernal chest pain that started few hours ago prior to arrival to the ED. pain radiating to the left arm and was associated with nausea, vomiting, mild shortness of breath.  Patient had a syncopal episode while he was in the ambulance. In the ED, patient afebrile, heart rate within normal limits, respiratory rate within normal limits, blood pressure borderline low, requiring 4 L nasal cannula keep sats greater than 90%.  Serum sodium found to be low at 122.  EKG positive for ischemic changes concerning for STEMI. STEMI alert was activated, cardiology consulted.  Patient underwent cardiac cath with 2 THERON placed in proximal left circumflex and mid LAD.  Patient noted to have residual distal left circumflex stenosis 70-75% range.  Cardiology planning on further intervention as an outpatient following discharge.  Tolerated the procedure well and patient transferred to the ICU for further management.  Hospitalist service has been consulted for further management. Started on dual antiplatelet therapy, metoprolol, atorvastatin.  Patient started on empiric antibiotics for possible pneumonia.  CT of the chest performed which demonstrated large mass in the right upper lobe.  Further workup per pulmonology.  Started on gentle IV fluids.  Following BMPs every 6 hours.  1 of 2 initial blood cultures positive for strep species suspect contaminant.  Repeat blood culture pending.    Interval Followup: Patient sitting up in bed appears to be resting comfortable.  Patient denies any further chest pain denies shortness of breath denies  productive cough.     Review of Systems  Constitutional: Positive for fatigue and negative fever.   HENT: Negative for sore throat and trouble swallowing.    Eyes: Negative for pain and discharge.   Respiratory: Negative for cough and shortness of breath.    Cardiovascular: Negative for chest pain and palpitations.   Gastrointestinal: Negative for abdominal pain, nausea and vomiting.   Endocrine: Negative for cold intolerance and heat intolerance.   Genitourinary: Negative for difficulty urinating and dysuria.   Musculoskeletal: Negative for back pain and neck stiffness.   Skin: Negative for color change and rash.   Neurological: Negative for syncope and headaches.   Hematological: Negative for adenopathy.   Psychiatric/Behavioral: Negative for confusion and hallucinations.    Objective   Objective     Vitals:   Temp:  [97.4 °F (36.3 °C)-98.5 °F (36.9 °C)] 98 °F (36.7 °C)  Heart Rate:  [78-93] 90  Resp:  [16-20] 18  BP: ()/(49-83) 119/58  Flow (L/min):  [3] 3  Physical Exam   General: well-developed appearing stated age thin frail appearing in no acute distress  HEENT: Normocephalic atraumatic moist membranes pupils equal round, no scleral icterus no conjunctival injection  Cardiovascular: regular rate and rhythm no murmurs, no lower extremity edema appreciated  Pulmonary: Fair air movement bilaterally, crackles at the bilateral bases, unlabored, no conversational dyspnea appreciated  Gastrointestinal: Soft nontender nondistended positive bowel sounds   Musculoskeletal: No clubbing cyanosis, warm and well-perfused, calves soft symmetric nontender bilaterally  Skin: Clean dry without rashes  Neuro: Cranial nerves II through XII intact grossly, moving all extremities equally-no focal weakness appreciated  Psych: Patient is calm cooperative and appropriate with exam not responding to internal stimuli  : No Nye catheter no bladder distention no suprapubic tenderness    Result Review    Result Review:  I have  personally reviewed these results and agree with these findings:  [x]  Laboratory  LAB RESULTS:      Lab 12/27/23  0632 12/26/23  0159 12/26/23 0046 12/25/23 2152 12/25/23 1927   WBC 5.75 4.53  --   --  5.17   HEMOGLOBIN 13.3 13.8  --   --  13.3   HEMATOCRIT 39.0 39.7  --   --  38.4   PLATELETS 331 311  --   --  321   NEUTROS ABS  --  3.04  --   --  3.18   IMMATURE GRANS (ABS)  --  0.03  --   --  0.01   LYMPHS ABS  --  0.73  --   --  0.90   MONOS ABS  --  0.65  --   --  0.85   EOS ABS  --  0.06  --   --  0.20   MCV 92.6 91.5  --   --  91.4   PROCALCITONIN  --   --   --  2.44*  --    LACTATE  --   --  1.0  --   --          Lab 12/27/23  0632 12/27/23 0043 12/26/23 2156 12/26/23 0159 12/25/23 1927   SODIUM 126* 122* 120* 120* 122*   POTASSIUM 4.0 3.7 3.9 4.2 4.3   CHLORIDE 92* 89* 89* 89* 89*   CO2 24.7 20.2* 20.6* 18.8* 19.7*   ANION GAP 9.3 12.8 10.4 12.2 13.3   BUN 5* 7* 8 8 7*   CREATININE 0.55* 0.46* 0.52* 0.53* 0.61*   EGFR 109.3 115.4 111.2 110.5 105.9   GLUCOSE 83 102* 102* 93 100*   CALCIUM 9.0 8.6 8.6 8.8 9.0   MAGNESIUM 1.9  --   --  1.7 1.7   PHOSPHORUS 3.3  --   --  3.6  --    HEMOGLOBIN A1C  --   --   --   --  5.40         Lab 12/26/23 0159 12/25/23 1927   TOTAL PROTEIN 7.1 7.2   ALBUMIN 3.5 3.5   GLOBULIN 3.6 3.7   ALT (SGPT) 10 10   AST (SGOT) 26 29   BILIRUBIN 0.4 0.3   ALK PHOS 88 89   LIPASE  --  22         Lab 12/25/23  2152 12/25/23 1927   PROBNP  --  285.8   HSTROP T 11 11         Lab 12/25/23 1927   CHOLESTEROL 116   LDL CHOL 70   HDL CHOL 32*   TRIGLYCERIDES 68             Brief Urine Lab Results  (Last result in the past 365 days)        Color   Clarity   Blood   Leuk Est   Nitrite   Protein   CREAT   Urine HCG        12/26/23 0159             54.0               Microbiology Results (last 10 days)       Procedure Component Value - Date/Time    S. Pneumo Ag Urine or CSF - Urine, Urine, Clean Catch [[id number]]  (Normal) Collected: 12/26/23 0159    Lab Status: Final result Specimen:  Urine, Clean Catch Updated: 12/26/23 0230     Strep Pneumo Ag Negative    Legionella Antigen, Urine - Urine, Urine, Clean Catch [[id number]]  (Normal) Collected: 12/26/23 0159    Lab Status: Final result Specimen: Urine, Clean Catch Updated: 12/26/23 0229     LEGIONELLA ANTIGEN, URINE Negative    Respiratory Culture - Sputum, Cough [[id number]] Collected: 12/26/23 0159    Lab Status: Final result Specimen: Sputum from Cough Updated: 12/26/23 0307     Respiratory Culture Rejected     Gram Stain Moderate (3+) WBCs seen      Few (2+) Epithelial cells seen     Comment: Modified report. Previous result was Moderate (3+) Epithelial cells seen on 12/26/2023 at 0303 EST.         Moderate (3+) Gram positive cocci in pairs, chains and clusters    Narrative:      Specimen rejected due to oropharyngeal contamination. Please reorder and recollect specimen if clinically necessary.    Blood Culture - Blood, Arm, Right [[id number]]  (Abnormal) Collected: 12/26/23 0130    Lab Status: Final result Specimen: Blood from Arm, Right Updated: 12/27/23 0639     Blood Culture Streptococcus, Alpha Hemolytic     Isolated from Anaerobic Bottle     Gram Stain Anaerobic Bottle Gram positive cocci in chains    Narrative:      Probable contaminant requires clinical correlation, susceptibility not performed unless requested by physician.    Blood Culture ID, PCR - Blood, Arm, Right [[id number]]  (Abnormal) Collected: 12/26/23 0130    Lab Status: Final result Specimen: Blood from Arm, Right Updated: 12/26/23 1850     BCID, PCR Streptococcus spp, not A, B, or pneumoniae. Identification by BCID2 PCR.     BOTTLE TYPE Anaerobic Bottle    Blood Culture - Blood, Arm, Left [[id number]]  (Normal) Collected: 12/26/23 0046    Lab Status: Preliminary result Specimen: Blood from Arm, Left Updated: 12/27/23 0101     Blood Culture No growth at 24 hours    MRSA Screen, PCR (Inpatient) - Swab, Nares [[id number]]  (Normal) Collected: 12/26/23 0033    Lab Status: Final  result Specimen: Swab from Nares Updated: 12/26/23 0158     MRSA PCR No MRSA Detected    Narrative:      The negative predictive value of this diagnostic test is high and should only be used to consider de-escalating anti-MRSA therapy. A positive result may indicate colonization with MRSA and must be correlated clinically.            [x]  Microbiology  [x]  Radiology  CT Chest Without Contrast Diagnostic    Result Date: 12/26/2023    1. There is a large mass that has developed in the right upper lobe.  There are calcifications within the mass.  While it is possible this could relate to granulomatous exposure or possibly sarcoid, a malignancy could not be excluded. 2. Calcified mediastinal and right hilar lymph nodes are suggested. 3. Calcification in what looks like probable soft tissue mass right upper quadrant abdomen 4. Bibasilar effusions 5. Emphysematous changes most marked upper lobes 6. Coronary artery calcification.  FNA of mass would be recommended.    LUKE MILLER MD       Electronically Signed and Approved By: LUKE MILLER MD on 12/26/2023 at 16:53             Cardiac Catheterization/Vascular Study    Addendum Date: 12/26/2023    OPERATORS Jeancarlos White M.D. (Attending Cardiologist)  PROCEDURES PERFORMED Ultrasound guided Vascular access Left Heart Catheterization Coronary Angiogram PCI of left circumflex artery IVUS of left circumflex artery PCI of mid LAD IVUS of LAD Moderate sedation  INDICATIONS FOR PROCEDURE 68 years old man with multiple cardiovascular risk factors presented to the hospital with chest pain and dynamic ECG changes in the anterolateral leads.  After receiving nitroglycerin he became severely hypotensive into 70s systolic.  After emergent discussion of risk and benefit of the procedure he was brought to the Cath Lab for definitive coronary angiography.  PROCEDURE IN DETAIL Informed consent was obtained from the patient after explaining the risks, benefits, and alternative options of the  procedure. After obtaining informed consent, the patient was brought to the cath lab and was prepped in a sterile fashion. Lidocaine 2% was used for local anesthesia into the right femoral access site. The right femoral artery was accessed with a micropuncture needle via modified Seldinger technique under ultrasound guidance. A 6F was inserted successfully. Afterwards, 6F JR4 and JL5 diagnostic catheters were advanced over a wire into the ascending aorta and were used to engage the ostia of the left main and RCA respectively. JR4 used to cross the AV and obtain LV pressures and gradient across the AV measured via pullback technique. Images of the right and left coronary systems were obtained.  HEMODYNAMICS  LV: 115/0, 3 mmHg AO: 119/69, 89 mmHg No significant gradient across the aortic valve during pullback of JR4 catheter.  LV gram was not performed during the study.  FINDINGS Coronary Angiogram Right dominant circulation Left main: Left main is a large caliber vessel which gives rise to the Left Anterior Descending and the Left circumflex.  Ostium of the left main has 20% stenosis. Left Anterior Descending Artery: LAD is a medium caliber vessel which gives rise to several septal perforators and several diagonal branches.  Mid LAD has 80% tubular stenosis.  JANELL-3 flow Left Circumflex: Left circumflex artery gives rise to obtuse marginals.  Proximal left circumflex has tubular 80% hazy appearing stenosis.  Mid segment of left circumflex at bifurcation with OM has a complex 70% stenosis in both segments.  JANELL-3 flow Right Coronary Artery: The RCA is a medium caliber vessel gives rise to PDA and PLV.  Mid RCA has diffuse 40% stenosis. Percutaneous coronary intervention Diagnostic coronary angiography suggested both left circumflex and LAD to be contributed to ischemia and symptoms.  Left circumflex being the worst of the 2 vessels. 100 units/kg of heparin was administered and ACT of more than 250 was documented. A 6  Japanese XB 4.0 guide catheter was used to engage the ostium of the left main coronary artery. 0.014 run-through wire was then advanced into the left circumflex artery. I then advanced intravascular ultrasound catheter into the proximal segment of left circumflex. IVUS showed proximal left circumflex to be 3 mm and mid left circumflex to be 3.5 mm in dimension.  IVUS also showed calcification with thrombus in the proximal left circumflex. The lesion in the proximal left circumflex was predilated with 2.5 x 12 mm compliant balloon at 12 lisa. This was followed by placement of 3 x 15 mm Xience magi point stent which was then postdilated with 3.25 x 12 mm noncompliant balloon at 14 lisa. I then noted a new 50% stenosis at the ostium of left circumflex artery which was treated with balloon angioplasty by a 3 x 12 mm compliant balloon at 12 lisa. Final angiography showed 20-30% stenosis at the ostium of left circumflex artery.  JANELL-3 flow in the left circumflex artery and OM.  I did not treat mid left circumflex vessel as it was a complex bifurcating lesion which would have required more contrast and time while the patient was hypotensive receiving pressors and IV fluid. I then quickly diverted my attention to the LAD which was wired with 0.014 run-through wire. Intravascular ultrasound catheter was then advanced into the mid LAD and a slow manual pullback was performed. IVUS showed LAD size to be 3.5 mm.  IVUS also confirmed 80% calcific stenosis in the mid LAD. I then predilated this lesion with 3.25 x 12 mm noncompliant balloon at 12 lisa. This was followed by placement of 3.25 x 33 mm Xience magi point stent. The stent was postdilated with 3.5 x 12 mm noncompliant balloon at 16 lisa. Final angiography showed 0% stenosis in LAD and JANELL-3 flow. All the catheters were exchanged over a wire and subsequently removed.  6 Japanese introducer sheath was left in place to be pulled manually at a later time when ACT is less than 150   ESTIMATED BLOOD LOSS: 10 ml  COMPLICATIONS: None  IMPRESSIONS PCI of proximal left circumflex and mid LAD. Low LVEDP  RECOMMENDATIONS -Start dual antiplatelet therapy, high intensity statin and beta-blocker -Staged bifurcation PCI of mid left circumflex artery/OM -Smoking cessation -Obtain an echocardiogram -Cardiac rehab at the time of discharge. Electronically signed by Jeancarlos White MD, 12/25/23, 9:52 PM EST.       Addendum Date: 12/26/2023    OPERATORS Jeancarlos White M.D. (Attending Cardiologist)  PROCEDURES PERFORMED Ultrasound guided Vascular access Left Heart Catheterization Coronary Angiogram PCI of left circumflex artery IVUS of left circumflex artery PCI of mid LAD IVUS of LAD Moderate sedation  INDICATIONS FOR PROCEDURE 68 years old man with multiple cardiovascular risk factors presented to the hospital with chest pain and dynamic ECG changes in the anterolateral leads.  After receiving nitroglycerin he became severely hypotensive into 70s systolic.  After emergent discussion of risk and benefit of the procedure he was brought to the Cath Lab for definitive coronary angiography.  PROCEDURE IN DETAIL Informed consent was obtained from the patient after explaining the risks, benefits, and alternative options of the procedure. After obtaining informed consent, the patient was brought to the cath lab and was prepped in a sterile fashion. Lidocaine 2% was used for local anesthesia into the right femoral access site. The right femoral artery was accessed with a micropuncture needle via modified Seldinger technique under ultrasound guidance. A 6F was inserted successfully. Afterwards, 6F JR4 and JL5 diagnostic catheters were advanced over a wire into the ascending aorta and were used to engage the ostia of the left main and RCA respectively. JR4 used to cross the AV and obtain LV pressures and gradient across the AV measured via pullback technique. Images of the right and left coronary systems were obtained.   HEMODYNAMICS  LV: 115/0, 3 mmHg AO: 119/69, 89 mmHg No significant gradient across the aortic valve during pullback of JR4 catheter.  LV gram was not performed during the study.  FINDINGS Coronary Angiogram Right dominant circulation Left main: Left main is a large caliber vessel which gives rise to the Left Anterior Descending and the Left circumflex.  Ostium of the left main has 20% stenosis. Left Anterior Descending Artery: LAD is a medium caliber vessel which gives rise to several septal perforators and several diagonal branches.  Mid LAD has 80% tubular stenosis.  JANELL-3 flow Left Circumflex: Left circumflex artery gives rise to obtuse marginals.  Proximal left circumflex has tubular 80% hazy appearing stenosis.  Mid segment of left circumflex at bifurcation with OM has a complex 70% stenosis in both segments.  JANELL-3 flow Right Coronary Artery: The RCA is a medium caliber vessel gives rise to PDA and PLV.  Mid RCA has diffuse 40% stenosis. Percutaneous coronary intervention Diagnostic coronary angiography suggested both left circumflex and LAD to be contributed to ischemia and symptoms.  Left circumflex being the worst of the 2 vessels. 100 units/kg of heparin was administered and ACT of more than 250 was documented. 0.014 run-through wire was then advanced into the left circumflex artery. I then advanced intravascular ultrasound catheter into the proximal segment of left circumflex. IVUS showed proximal left circumflex to be 3 mm and mid left circumflex to be 3.5 mm in dimension.  IVUS also showed calcification with thrombus in the proximal left circumflex. The lesion in the proximal left circumflex was predilated with 2.5 x 12 mm compliant balloon at 12 lisa. This was followed by placement of 3 x 15 mm Xience magi point stent which was then postdilated with 3.25 x 12 mm noncompliant balloon at 14 lisa. I then noted a new 50% stenosis at the ostium of left circumflex artery which was treated with balloon  angioplasty by a 3 x 12 mm compliant balloon at 12 lisa. Final angiography showed 20-30% stenosis at the ostium of left circumflex artery.  JANELL-3 flow in the left circumflex artery and OM.  I did not treat mid left circumflex vessel as it was a complex bifurcating lesion which would have required more contrast and time while the patient was hypotensive receiving pressors and IV fluid. I then quickly diverted my attention to the LAD which was wired with 0.014 run-through wire. Intravascular ultrasound catheter was then advanced into the mid LAD and a slow manual pullback was performed. IVUS showed LAD size to be 3.5 mm.  IVUS also confirmed 80% calcific stenosis in the mid LAD. I then predilated this lesion with 3.25 x 12 mm noncompliant balloon at 12 lisa. This was followed by placement of 3.25 x 33 mm Xience magi point stent. The stent was postdilated with 3.5 x 12 mm noncompliant balloon at 16 lisa. Final angiography showed 0% stenosis in LAD and JANELL-3 flow. All the catheters were exchanged over a wire and subsequently removed.  6 Welsh introducer sheath was left in place to be pulled manually at a later time when ACT is less than 150  ESTIMATED BLOOD LOSS: 10 ml  COMPLICATIONS: None  IMPRESSIONS PCI of proximal left circumflex and mid LAD. Low LVEDP  RECOMMENDATIONS -Start dual antiplatelet therapy, high intensity statin and beta-blocker -Staged bifurcation PCI of mid left circumflex artery/OM -Smoking cessation -Obtain an echocardiogram -Cardiac rehab at the time of discharge. Electronically signed by Jeancarlos White MD, 12/25/23, 9:52 PM EST.       XR Chest 1 View    Result Date: 12/25/2023   Consolidation within right upper lung, concerning for pneumonia.  Recommend follow-up CXR in 4-6 weeks to document resolution.       BLAIR ROBERSON MD       Electronically Signed and Approved By: BLAIR ROBERSON MD on 12/25/2023 at 19:53              [x]  EKG/Telemetry   [x]  Cardiology/Vascular   Echocardiogram  12/26/2023    Left ventricular ejection fraction appears to be 51 - 55%.    Left ventricular diastolic function is consistent with (grade I) impaired relaxation and age.    Estimated right ventricular systolic pressure from tricuspid regurgitation is moderately elevated (45-55 mmHg).    Mild to moderate tricuspid regurgitation.  []  Pathology  []  Old records  [x]  Other:  Scheduled Meds:amLODIPine, 10 mg, Oral, Daily  ampicillin-sulbactam, 3 g, Intravenous, Q6H  aspirin, 81 mg, Oral, Daily  atorvastatin, 40 mg, Oral, Nightly  gabapentin, 600 mg, Oral, Q8H  lisinopril, 10 mg, Oral, Q24H  metoprolol succinate XL, 25 mg, Oral, Q24H  ticagrelor, 90 mg, Oral, BID      Continuous Infusions:     PRN Meds:.  acetaminophen    atropine    diphenhydrAMINE    HYDROcodone-acetaminophen    ipratropium-albuterol    nicotine    nitroglycerin    nitroglycerin    ondansetron ODT **OR** ondansetron    sodium chloride    sodium chloride      Assessment & Plan   Assessment / Plan     Assessment/Plan:  Chest pain  STEMI s/p THERON to proximal left circumflex and mid LAD   Coronary artery disease with residual distal left circumflex stenosis of 70 to 75%.  Right Upper lobe lung mass concerning for malignancy  Acute hypoxic respiratory failure  Acute cardiogenic pulmonary edema with bilateral pleural effusions  Suspected community-acquired pneumonia  Suspected blood culture contaminant 1 of 2 positive for strep species not A B or pneumoniae  Moderate hyponatremia, clinically significant DDx likely due to dehydration and concern for possible SIADH due to pulmonary malignancy  Hypertension  COPD without acute exacerbation  Current active tobacco use  Severe protein calorie malnutrition        Patient admitted for further evaluation and treatment  Pulmonology critical care and cardiology consulted thank you for your assistance  Status post PCI as above  Continue aspirin, Brilinta and atorvastatin  Continue metoprolol lisinopril  Plan for  outpatient follow-up with cardiology for further intervention on residual distal left circumflex stenosis  Further workup of right upper lobe lung mass per pulmonology as outpatient  Continue Rocephin azithromycin plan to discuss with pulmonology de-escalation tomorrow  Repeat blood culture pending though suspect contaminant based on current speciation 1 out of 2 bottles  Status post IV fluids  Lasix 40 mg IV x 1 on today  Wean O2 as tolerated  Continue amlodipine  Continue as needed DuoNebs  Patient counseled the importance of tobacco cessation  Will provide supplemental nicotine as needed  Continue protein supplementation with meals  Further inpatient orders recommendations pending clinical course         Discussed plan with bedside RN, cardiology as well as pulmonology critical care team.    Disposition: Home once cleared by pulmonology and cardiology.    DVT prophylaxis:  Mechanical DVT prophylaxis orders are present.    CODE STATUS:   Level Of Support Discussed With: Patient  Code Status (Patient has no pulse and is not breathing): CPR (Attempt to Resuscitate)  Medical Interventions (Patient has pulse or is breathing): Full Support

## 2023-12-27 NOTE — PROGRESS NOTES
[facility]     Cardiology Progress Note    Patient Name: Hermelindo Zuñiga  : 1955  MRN: [id number]  Primary Care Physician:  Antony Florence MD  Date of admission: 2023    Subjective   Subjective   Chief complaint  Chest pain, abnormal EKG, right upper lobe consolidation, hyponatremia    HPI:  Patient Reports overall he feels good.  He denies any chest pain or shortness of breath.    Review of Systems   All systems were reviewed and negative except for: Cardiac review of systems negative.    Objective   Objective     Vitals:   Temp:  [97.5 °F (36.4 °C)-98.5 °F (36.9 °C)] 98 °F (36.7 °C)  Heart Rate:  [79-93] 88  Resp:  [16-20] 18  BP: ()/(49-82) 96/62  Flow (L/min):  [3] 3  Physical Exam   Neck: no JVD, no bruit  Lungs: clear to ausculation bilaterally.  No crackles or wheezes  CV: regular rate and rhythm, no murmur  Ext: no cyanosis, clubbing or edema.  Normal bilateral LE pulses.      Scheduled Meds:amLODIPine, 10 mg, Oral, Daily  ampicillin-sulbactam, 3 g, Intravenous, Q6H  aspirin, 81 mg, Oral, Daily  atorvastatin, 40 mg, Oral, Nightly  gabapentin, 600 mg, Oral, Q8H  lisinopril, 10 mg, Oral, Q24H  metoprolol succinate XL, 25 mg, Oral, Q24H  ticagrelor, 90 mg, Oral, BID      Continuous Infusions:        Result Review    Result Review:  I have personally reviewed the results from the time of this admission to 2023 17:46 EST and agree with these findings:  [x]  Laboratory  []  Microbiology  [x]  Radiology  [x]  EKG/Telemetry   [x]  Cardiology/Vascular   []  Pathology  []  Old records  []  Other:  Most notable findings include:     CBC          2023    19:27 2023    01:59 2023    06:32   CBC   WBC 5.17  4.53  5.75    RBC 4.20  4.34  4.21    Hemoglobin 13.3  13.8  13.3    Hematocrit 38.4  39.7  39.0    MCV 91.4  91.5  92.6    MCH 31.7  31.8  31.6    MCHC 34.6  34.8  34.1    RDW 13.2  13.1  13.2    Platelets 321  311  331      [facility]          2023    19:27  12/26/2023    01:59 12/26/2023    21:56 12/27/2023    00:43 12/27/2023    06:32 12/27/2023    11:47   CMP   Glucose 100  93  102  102  83  80    BUN 7  8  8  7  5  7    Creatinine 0.61  0.53  0.52  0.46  0.55  0.58    EGFR 105.9  110.5  111.2  115.4  109.3  107.6    Sodium 122  120  120  122  126  128    Potassium 4.3  4.2  3.9  3.7  4.0  3.9    Chloride 89  89  89  89  92  91    Calcium 9.0  8.8  8.6  8.6  9.0  8.9    Total Protein 7.2  7.1        Albumin 3.5  3.5        Globulin 3.7  3.6        Total Bilirubin 0.3  0.4        Alkaline Phosphatase 89  88        AST (SGOT) 29  26        ALT (SGPT) 10  10        Albumin/Globulin Ratio 0.9  1.0        BUN/Creatinine Ratio 11.5  15.1  15.4  15.2  9.1  12.1    Anion Gap 13.3  12.2  10.4  12.8  9.3  14.3       CARDIAC LABS:      Lab 12/25/23  2152 12/25/23  1927   PROBNP  --  285.8   HSTROP T 11 11        Assessment & Plan   Assessment / Plan     Brief Patient Summary:  Hermelindo Zuñiga is a 68 y.o. male who presented with chest pain.  He had an abnormal EKG.  He had JANELL-3 flow to both the left circumflex and LAD.  He did receive a proximal left circumflex stent that was 3.0 x 15 mm postdilated up to 3.25.  He received an LAD proximal to mid stent that was 3.25 x 33 mm postdilated up to 3.5 mm.  He had no significant disease in the RCA.  He does have more distal left circumflex diffuse disease up to 70 to 75% after the takeoff of a OM.  His ejection fraction appears at approximately 50%.  He is an appropriate septal motion.  He has mild tricuspid regurgitation with an RVSP in the 50s.  He has known COPD and smokes.  He was found to have a right upper lobe consolidation and they are planning a CAT scan.  He is being followed by pulmonary.     Active Hospital Problems:  Active Hospital Problems    Diagnosis     **STEMI (ST elevation myocardial infarction)     Severe malnutrition        Assessment:  1.  Coronary artery disease status post proximal left circumflex  stent and proximal to mid LAD stent.  He has residual more distal left circumflex stenosis that is in the 70 to 75% range.  2.  Right upper lobe consolidation  3.  Tobacco abuse  4.  Hypertension  5.  Hyperlipidemia     Plan:   1.  I reviewed patient's cath films.  His distal left circumflex has diffuse disease in the 70 to 75% range.  This does look like stable stenosis.  He does appear in some views to possibly have a area to touch down the stent without affecting the OM.  He would need both these vessels wired.  Would consider possibly only balloon angioplasty of the OM if plaque shift.  I do not think this is emergent and could likely be done as an outpatient in 3-4 weeks.  2.  Continue the aspirin and Brilinta.  Continue the Lipitor, Toprol, lisinopril and amlodipine.  3.  Smoking cessation  4.  Patient was found to have a large mass in the right upper lobe.  I did discuss with pulmonary today.  Unfortunately we would like to try to hold off on stopping the Brilinta for at least 3 months.  There is some option to admit him and put him on either heparin or cangrelor drip which could be stopped and then a biopsy done.  They are going to follow him as an outpatient.  5.  Patient can be discharged from a cardiac standpoint.  His echocardiogram showed EF of 51 to 55% with mild to moderate tricuspid regurgitation and estimated RVSP in the 40 to 50 mmHg range.  We did talk about smoking cessation.  6.  Will follow from a distance.  Call with any questions.  CODE STATUS:   Level Of Support Discussed With: Patient  Code Status (Patient has no pulse and is not breathing): CPR (Attempt to Resuscitate)  Medical Interventions (Patient has pulse or is breathing): Full Support      Electronically signed by Altaf Barrera MD, 12/27/23, 5:46 PM EST.

## 2023-12-27 NOTE — PLAN OF CARE
Goal Outcome Evaluation:          Patient A/O x 4. VSS. Medications and Antibiotics administered per MAR. Patient on 3L nasal cannula. Patient heart catheterization site intact with dry drainage, no presence of hematoma. Pain 9/10 reported to patient back on shift. PRN Norco administered on shift. No other concerns at this time. Plan of care ongoing.         Progress: improving            Problem: Adult Inpatient Plan of Care  Goal: Plan of Care Review  Outcome: Ongoing, Progressing  Flowsheets  Taken 12/27/2023 1756 by Gino Li RN  Progress: improving  Taken 12/27/2023 0513 by Kya García RN  Plan of Care Reviewed With: patient  Goal: Patient-Specific Goal (Individualized)  Outcome: Ongoing, Progressing  Goal: Absence of Hospital-Acquired Illness or Injury  Outcome: Ongoing, Progressing  Intervention: Identify and Manage Fall Risk  Recent Flowsheet Documentation  Taken 12/27/2023 1730 by Gino Li RN  Safety Promotion/Fall Prevention:   safety round/check completed   room organization consistent  Taken 12/27/2023 1529 by Gino Li RN  Safety Promotion/Fall Prevention:   safety round/check completed   room organization consistent  Taken 12/27/2023 1330 by Gino Li RN  Safety Promotion/Fall Prevention:   safety round/check completed   room organization consistent  Taken 12/27/2023 1130 by Gino Li RN  Safety Promotion/Fall Prevention:   safety round/check completed   room organization consistent  Taken 12/27/2023 0930 by Gino Li RN  Safety Promotion/Fall Prevention:   safety round/check completed   room organization consistent  Taken 12/27/2023 0825 by Gino Li RN  Safety Promotion/Fall Prevention:   safety round/check completed   room organization consistent  Taken 12/27/2023 0730 by Gino Li RN  Safety Promotion/Fall Prevention:   safety round/check completed   room organization consistent  Intervention: Prevent and Manage VTE (Venous  Thromboembolism) Risk  Recent Flowsheet Documentation  Taken 12/27/2023 0825 by Gino Li RN  VTE Prevention/Management: (see MAR) other (see comments)  Range of Motion: active ROM (range of motion) encouraged  Intervention: Prevent Infection  Recent Flowsheet Documentation  Taken 12/27/2023 1730 by Gino Li RN  Infection Prevention:   single patient room provided   rest/sleep promoted   hand hygiene promoted  Taken 12/27/2023 1529 by Gino Li RN  Infection Prevention:   single patient room provided   rest/sleep promoted   hand hygiene promoted  Taken 12/27/2023 1330 by Gino Li RN  Infection Prevention:   single patient room provided   rest/sleep promoted   hand hygiene promoted  Taken 12/27/2023 1130 by Gino Li RN  Infection Prevention:   single patient room provided   rest/sleep promoted   hand hygiene promoted  Taken 12/27/2023 0930 by Gino Li RN  Infection Prevention:   single patient room provided   rest/sleep promoted   hand hygiene promoted  Taken 12/27/2023 0825 by Gino Li RN  Infection Prevention:   single patient room provided   rest/sleep promoted   hand hygiene promoted  Taken 12/27/2023 0730 by Gino Li RN  Infection Prevention:   single patient room provided   rest/sleep promoted   hand hygiene promoted  Goal: Optimal Comfort and Wellbeing  Outcome: Ongoing, Progressing  Intervention: Provide Person-Centered Care  Recent Flowsheet Documentation  Taken 12/27/2023 0825 by Gino Li RN  Trust Relationship/Rapport:   care explained   choices provided   empathic listening provided   emotional support provided   questions answered   questions encouraged   reassurance provided   thoughts/feelings acknowledged  Goal: Readiness for Transition of Care  Outcome: Ongoing, Progressing     Problem: Skin Injury Risk Increased  Goal: Skin Health and Integrity  Outcome: Ongoing, Progressing     Problem: Fall Injury Risk  Goal:  Absence of Fall and Fall-Related Injury  Outcome: Ongoing, Progressing  Intervention: Promote Injury-Free Environment  Recent Flowsheet Documentation  Taken 12/27/2023 1730 by Gino Li, RN  Safety Promotion/Fall Prevention:   safety round/check completed   room organization consistent  Taken 12/27/2023 1529 by Gino Li, RN  Safety Promotion/Fall Prevention:   safety round/check completed   room organization consistent  Taken 12/27/2023 1330 by Gino Li, RN  Safety Promotion/Fall Prevention:   safety round/check completed   room organization consistent  Taken 12/27/2023 1130 by Gino Li, RN  Safety Promotion/Fall Prevention:   safety round/check completed   room organization consistent  Taken 12/27/2023 0930 by Gino Li, RN  Safety Promotion/Fall Prevention:   safety round/check completed   room organization consistent  Taken 12/27/2023 0825 by Gino Li, RN  Safety Promotion/Fall Prevention:   safety round/check completed   room organization consistent  Taken 12/27/2023 0730 by Gino Li, RN  Safety Promotion/Fall Prevention:   safety round/check completed   room organization consistent

## 2023-12-27 NOTE — PROGRESS NOTES
Pulmonary / Critical Care Progress Note      Patient Name: Hermelindo Zuñiga  : 1955  MRN: [id number]  Attending:  Steve Kendall, *  Date of admission: 2023    Subjective   Subjective   Follow-up for lung mass    Over past 24 hours:  Planing of shortness of breath and orthopnea this morning  CT did show small bilateral effusions and some volume overload.  Also showed a right upper lobe lung mass with calcifications concerning for a pulmonary malignancy  Remains on IV fluids this morning  Cultures so far pending/negative  Does have hyponatremia  Is weak and fatigue  No chest pain or hemoptysis  No nausea, fevers or chills      Objective   Objective     Vitals:   Temp:  [97.5 °F (36.4 °C)-98.5 °F (36.9 °C)] 97.5 °F (36.4 °C)  Heart Rate:  [78-93] 84  Resp:  [16-20] 17  BP: ()/(49-83) 129/66  Flow (L/min):  [3] 3    Physical Exam   Vital Signs Reviewed   General: Frail chronically ill-appearing male, Alert, NAD.    HEENT:  PERRL, EOMI.  OP, nares clear  Chest:  good aeration, barrel chested, diminished with crackles bilaterally, tympanic to percussion bilaterally, no work of breathing noted  CV: RRR, no MGR, pulses 2+, equal.  Abd:  Soft, NT, ND, + BS, no HSM  EXT:  no clubbing, no cyanosis, no edema, muscle wasting x 4 extremities  Neuro:  A&Ox3, CN grossly intact, no focal deficits.  Skin: No rashes or lesions noted      Result Review    Result Review:  I have personally reviewed the results from the time of this admission to 2023 13:45 EST and agree with these findings:  [x]  Laboratory  [x]  Microbiology  [x]  Radiology  [x]  EKG/Telemetry   [x]  Cardiology/Vascular   []  Pathology  []  Old records  []  Other:  Most notable findings include:       Lab 23  1147 23  0632 23  0043 23  2156 23  0159 23  1927   WBC  --  5.75  --   --  4.53 5.17   HEMOGLOBIN  --  13.3  --   --  13.8 13.3   HEMATOCRIT  --  39.0  --   --  39.7 38.4   PLATELETS  --   331  --   --  311 321   SODIUM 128* 126* 122* 120* 120* 122*   POTASSIUM 3.9 4.0 3.7 3.9 4.2 4.3   CHLORIDE 91* 92* 89* 89* 89* 89*   CO2 22.7 24.7 20.2* 20.6* 18.8* 19.7*   BUN 7* 5* 7* 8 8 7*   CREATININE 0.58* 0.55* 0.46* 0.52* 0.53* 0.61*   GLUCOSE 80 83 102* 102* 93 100*   CALCIUM 8.9 9.0 8.6 8.6 8.8 9.0   PHOSPHORUS  --  3.3  --   --  3.6  --    TOTAL PROTEIN  --   --   --   --  7.1 7.2   ALBUMIN  --   --   --   --  3.5 3.5   GLOBULIN  --   --   --   --  3.6 3.7     CT Chest Without Contrast Diagnostic    Result Date: 12/26/2023  PROCEDURE: CT CHEST WO CONTRAST DIAGNOSTIC  COMPARISON: [facility], , XR CHEST 1 VW, 12/25/2023, 19:36.  [facility], , CHEST AP/PA 1 VIEW, 4/15/2013, 7:42.  INDICATIONS: abnorml CXR  TECHNIQUE: CT images were created without the administration of contrast material.   PROTOCOL:   ION Protocol    RADIATION:   DLP: 117 mGy*cm   Automated exposure control was utilized to minimize radiation dose.  FINDINGS:  There are emphysematous changes.  There is a mass in the right upper lobe which has developed since 2013. There are calcifications within this mass.  This extends to the hilar area.  This measures about 7.6 by 7.4 cm.  There is calcification in precarinal, right hilar and subcarinal lymph nodes.  There is coronary artery calcification.  There are bilateral pleural effusions.  There are emphysematous changes with bulla most pronounced right apical area.  Lower slices through the upper abdomen reveal some soft tissue fullness in the right upper quadrant with calcification.       Impression:   1. There is a large mass that has developed in the right upper lobe.  There are calcifications within the mass.  While it is possible this could relate to granulomatous exposure or possibly sarcoid, a malignancy could not be excluded. 2. Calcified mediastinal and right hilar lymph nodes are suggested. 3. Calcification in what looks like probable soft tissue mass right  upper quadrant abdomen 4. Bibasilar effusions 5. Emphysematous changes most marked upper lobes 6. Coronary artery calcification.  FNA of mass would be recommended.    LUKE MILLER MD       Electronically Signed and Approved By: LUKE MILLER MD on 12/26/2023 at 16:53                Assessment & Plan   Assessment / Plan     Active Hospital Problems:  Active Hospital Problems    Diagnosis     **STEMI (ST elevation myocardial infarction)     Severe malnutrition      Impression:  Lung mass highly concerning for primary bronchogenic carcinoma.  Could potentially be invasive fungal infection as well  Euvolemic hyponatremia, clinically insignificant.  Question paraneoplastic acute hypoxemic respiratory failure  Acute cardiogenic pulmonary edema  Small bilateral pleural effusions  Possible community-acquired pneumonia from unspecified organism  COPD without exacerbation  Severe protein calorie malnutrition with muscle wasting    Plan:  I did review the patient's chest CT.  Does have a right upper lobe lung mass.  Highly concerning for primary lung cancer in the setting of hyponatremia and a smoking history.  Could potentially be an invasive fungal infection given its appearance.  Tissue biopsy would be of the utmost importance.  Will discuss with cardiology about timing of obtaining tissue biopsy after STEMI and when we can safely hold Brilinta x 5 days.  If it will be multiple months, we will have to have a risk versus benefits discussed with the patient about proceeding with lung biopsy on dual antiplatelet therapy as he may miss a window of treating this potential pulmonary malignancy  Discontinue IV fluids and give 40 mg IV Lasix x 1  Trend renal panel and electrolytes  Does have hyponatremia.  Could be paraneoplastic that does have lung cancer.  Monitor closely  Will send fungal serologies  No indication to drain pleural effusions at this time.  They are tiny.  Will diurese and monitor at this time  Wean O2 to keep SPO2  greater than 90%    DVT prophylaxis:  Mechanical DVT prophylaxis orders are present.    CODE STATUS:   Level Of Support Discussed With: Patient  Code Status (Patient has no pulse and is not breathing): CPR (Attempt to Resuscitate)  Medical Interventions (Patient has pulse or is breathing): Full Support      Labs, imaging, microbiology, notes and medications personally reviewed  Discussed with primary    Electronically signed by Carl Castellanos MD, 12/27/23, 1:47 PM EST.

## 2023-12-27 NOTE — PLAN OF CARE
Goal Outcome Evaluation:  Plan of Care Reviewed With: patient        Progress: no change  Outcome Evaluation: transfer from ICU No problems or complaints this shift. plan of care ongoing.

## 2023-12-28 LAB
ANION GAP SERPL CALCULATED.3IONS-SCNC: 10.8 MMOL/L (ref 5–15)
ANION GAP SERPL CALCULATED.3IONS-SCNC: 11 MMOL/L (ref 5–15)
ARTERIAL PATENCY WRIST A: POSITIVE
BASE EXCESS BLDA CALC-SCNC: 2.5 MMOL/L (ref -2–2)
BDY SITE: ABNORMAL
BUN SERPL-MCNC: 7 MG/DL (ref 8–23)
BUN SERPL-MCNC: 9 MG/DL (ref 8–23)
BUN/CREAT SERPL: 12.3 (ref 7–25)
BUN/CREAT SERPL: 13.4 (ref 7–25)
CALCIUM SPEC-SCNC: 8.6 MG/DL (ref 8.6–10.5)
CALCIUM SPEC-SCNC: 8.6 MG/DL (ref 8.6–10.5)
CHLORIDE SERPL-SCNC: 92 MMOL/L (ref 98–107)
CHLORIDE SERPL-SCNC: 93 MMOL/L (ref 98–107)
CO2 SERPL-SCNC: 23.2 MMOL/L (ref 22–29)
CO2 SERPL-SCNC: 24 MMOL/L (ref 22–29)
COHGB MFR BLD: 0.4 % (ref 0–1.5)
CREAT SERPL-MCNC: 0.57 MG/DL (ref 0.76–1.27)
CREAT SERPL-MCNC: 0.67 MG/DL (ref 0.76–1.27)
EGFRCR SERPLBLD CKD-EPI 2021: 103 ML/MIN/1.73
EGFRCR SERPLBLD CKD-EPI 2021: 108.1 ML/MIN/1.73
FHHB: 8.8 % (ref 0–5)
GLUCOSE SERPL-MCNC: 87 MG/DL (ref 65–99)
GLUCOSE SERPL-MCNC: 91 MG/DL (ref 65–99)
HCO3 BLDA-SCNC: 26.1 MMOL/L (ref 22–26)
HGB BLDA-MCNC: 13.7 G/DL (ref 13.8–16.4)
INHALED O2 CONCENTRATION: 21 %
LACTATE BLDA-SCNC: ABNORMAL MMOL/L
METHGB BLD QL: 0.1 % (ref 0–1.5)
MODALITY: ABNORMAL
OXYHGB MFR BLDV: 90.7 % (ref 94–99)
PCO2 BLDA: 37.2 MM HG (ref 35–45)
PH BLDA: 7.46 PH UNITS (ref 7.35–7.45)
PO2 BLD: 286 MM[HG] (ref 0–500)
PO2 BLDA: 60 MM HG (ref 80–100)
POTASSIUM SERPL-SCNC: 3.5 MMOL/L (ref 3.5–5.2)
POTASSIUM SERPL-SCNC: 3.7 MMOL/L (ref 3.5–5.2)
SAO2 % BLDCOA: 91.2 % (ref 95–99)
SODIUM SERPL-SCNC: 127 MMOL/L (ref 136–145)
SODIUM SERPL-SCNC: 127 MMOL/L (ref 136–145)
WHOLE BLOOD HOLD SPECIMEN: NORMAL

## 2023-12-28 PROCEDURE — 80048 BASIC METABOLIC PNL TOTAL CA: CPT | Performed by: FAMILY MEDICINE

## 2023-12-28 PROCEDURE — 82375 ASSAY CARBOXYHB QUANT: CPT | Performed by: INTERNAL MEDICINE

## 2023-12-28 PROCEDURE — 25010000002 FUROSEMIDE PER 20 MG: Performed by: INTERNAL MEDICINE

## 2023-12-28 PROCEDURE — 83050 HGB METHEMOGLOBIN QUAN: CPT | Performed by: INTERNAL MEDICINE

## 2023-12-28 PROCEDURE — 25010000002 AMPICILLIN-SULBACTAM PER 1.5 G: Performed by: INTERNAL MEDICINE

## 2023-12-28 PROCEDURE — 94618 PULMONARY STRESS TESTING: CPT

## 2023-12-28 PROCEDURE — 82805 BLOOD GASES W/O2 SATURATION: CPT | Performed by: INTERNAL MEDICINE

## 2023-12-28 PROCEDURE — 36600 WITHDRAWAL OF ARTERIAL BLOOD: CPT | Performed by: INTERNAL MEDICINE

## 2023-12-28 PROCEDURE — 99232 SBSQ HOSP IP/OBS MODERATE 35: CPT | Performed by: INTERNAL MEDICINE

## 2023-12-28 PROCEDURE — 94799 UNLISTED PULMONARY SVC/PX: CPT

## 2023-12-28 PROCEDURE — 99233 SBSQ HOSP IP/OBS HIGH 50: CPT | Performed by: INTERNAL MEDICINE

## 2023-12-28 RX ORDER — POTASSIUM CHLORIDE 750 MG/1
40 CAPSULE, EXTENDED RELEASE ORAL ONCE
Status: COMPLETED | OUTPATIENT
Start: 2023-12-28 | End: 2023-12-28

## 2023-12-28 RX ORDER — NICOTINE 21 MG/24HR
1 PATCH, TRANSDERMAL 24 HOURS TRANSDERMAL DAILY PRN
Qty: 30 PATCH | Refills: 0 | Status: SHIPPED | OUTPATIENT
Start: 2023-12-28

## 2023-12-28 RX ORDER — ASPIRIN 81 MG/1
81 TABLET ORAL DAILY
Qty: 30 TABLET | Refills: 0 | Status: SHIPPED | OUTPATIENT
Start: 2023-12-29

## 2023-12-28 RX ORDER — METOPROLOL SUCCINATE 25 MG/1
25 TABLET, EXTENDED RELEASE ORAL
Qty: 30 TABLET | Refills: 0 | Status: SHIPPED | OUTPATIENT
Start: 2023-12-29 | End: 2024-01-01 | Stop reason: SDUPTHER

## 2023-12-28 RX ORDER — LISINOPRIL 10 MG/1
10 TABLET ORAL DAILY
Qty: 30 TABLET | Refills: 0 | Status: SHIPPED | OUTPATIENT
Start: 2023-12-28 | End: 2024-01-01 | Stop reason: SDUPTHER

## 2023-12-28 RX ORDER — AMLODIPINE BESYLATE 10 MG/1
10 TABLET ORAL DAILY
Qty: 30 TABLET | Refills: 0 | Status: SHIPPED | OUTPATIENT
Start: 2023-12-29 | End: 2024-01-01 | Stop reason: SDUPTHER

## 2023-12-28 RX ORDER — AMOXICILLIN AND CLAVULANATE POTASSIUM 875; 125 MG/1; MG/1
1 TABLET, FILM COATED ORAL 2 TIMES DAILY
Qty: 6 TABLET | Refills: 0 | Status: SHIPPED | OUTPATIENT
Start: 2023-12-28 | End: 2023-12-31

## 2023-12-28 RX ORDER — NITROGLYCERIN 0.4 MG/1
0.4 TABLET SUBLINGUAL
Qty: 20 TABLET | Refills: 0 | Status: SHIPPED | OUTPATIENT
Start: 2023-12-28

## 2023-12-28 RX ORDER — ATORVASTATIN CALCIUM 40 MG/1
40 TABLET, FILM COATED ORAL NIGHTLY
Qty: 30 TABLET | Refills: 0 | Status: SHIPPED | OUTPATIENT
Start: 2023-12-28

## 2023-12-28 RX ORDER — FUROSEMIDE 10 MG/ML
40 INJECTION INTRAMUSCULAR; INTRAVENOUS ONCE
Status: COMPLETED | OUTPATIENT
Start: 2023-12-28 | End: 2023-12-28

## 2023-12-28 RX ADMIN — TICAGRELOR 90 MG: 90 TABLET ORAL at 21:35

## 2023-12-28 RX ADMIN — TICAGRELOR 90 MG: 90 TABLET ORAL at 08:11

## 2023-12-28 RX ADMIN — ASPIRIN 81 MG: 81 TABLET, COATED ORAL at 08:11

## 2023-12-28 RX ADMIN — HYDROCODONE BITARTRATE AND ACETAMINOPHEN 1 TABLET: 5; 325 TABLET ORAL at 09:40

## 2023-12-28 RX ADMIN — HYDROCODONE BITARTRATE AND ACETAMINOPHEN 1 TABLET: 5; 325 TABLET ORAL at 03:39

## 2023-12-28 RX ADMIN — ATORVASTATIN CALCIUM 40 MG: 40 TABLET, FILM COATED ORAL at 21:35

## 2023-12-28 RX ADMIN — AMPICILLIN AND SULBACTAM 3 G: 2; 1 INJECTION, POWDER, FOR SOLUTION INTRAMUSCULAR; INTRAVENOUS at 15:33

## 2023-12-28 RX ADMIN — HYDROCODONE BITARTRATE AND ACETAMINOPHEN 1 TABLET: 5; 325 TABLET ORAL at 21:35

## 2023-12-28 RX ADMIN — GABAPENTIN 600 MG: 300 CAPSULE ORAL at 14:48

## 2023-12-28 RX ADMIN — AMPICILLIN AND SULBACTAM 3 G: 2; 1 INJECTION, POWDER, FOR SOLUTION INTRAMUSCULAR; INTRAVENOUS at 03:34

## 2023-12-28 RX ADMIN — Medication 10 ML: at 08:11

## 2023-12-28 RX ADMIN — FUROSEMIDE 40 MG: 10 INJECTION, SOLUTION INTRAMUSCULAR; INTRAVENOUS at 08:11

## 2023-12-28 RX ADMIN — POTASSIUM CHLORIDE 40 MEQ: 10 CAPSULE, COATED, EXTENDED RELEASE ORAL at 08:10

## 2023-12-28 RX ADMIN — AMPICILLIN AND SULBACTAM 3 G: 2; 1 INJECTION, POWDER, FOR SOLUTION INTRAMUSCULAR; INTRAVENOUS at 21:35

## 2023-12-28 RX ADMIN — AMPICILLIN AND SULBACTAM 3 G: 2; 1 INJECTION, POWDER, FOR SOLUTION INTRAMUSCULAR; INTRAVENOUS at 09:40

## 2023-12-28 RX ADMIN — GABAPENTIN 600 MG: 300 CAPSULE ORAL at 06:31

## 2023-12-28 RX ADMIN — GABAPENTIN 600 MG: 300 CAPSULE ORAL at 21:35

## 2023-12-28 NOTE — PROGRESS NOTES
[facility]   Hospitalist Progress Note  Date: 2023  Patient Name: Hermelindo Zuñiga  : 1955  MRN: [id number]  Date of admission: 2023      Subjective   Subjective     Chief Complaint: Follow-up chest pain    Summary:Hermelindo Zuñiga is a 68 y.o. male with past medical history of hypertension, COPD, current active tobacco use presented to the ED for evaluation of substernal chest pain that started few hours ago prior to arrival to the ED. pain radiating to the left arm and was associated with nausea, vomiting, mild shortness of breath.  Patient had a syncopal episode while he was in the ambulance. In the ED, patient afebrile, heart rate within normal limits, respiratory rate within normal limits, blood pressure borderline low, requiring 4 L nasal cannula keep sats greater than 90%.  Serum sodium found to be low at 122.  EKG positive for ischemic changes concerning for STEMI. STEMI alert was activated, cardiology consulted.  Patient underwent cardiac cath with 2 THERON placed in proximal left circumflex and mid LAD.  Patient noted to have residual distal left circumflex stenosis 70-75% range.  Cardiology planning on further intervention as an outpatient following discharge.  Tolerated the procedure well and patient transferred to the ICU for further management.  Hospitalist service has been consulted for further management. Started on dual antiplatelet therapy, metoprolol, atorvastatin.  Patient started on empiric antibiotics for possible pneumonia.  CT of the chest performed which demonstrated large mass in the right upper lobe.  Further workup per pulmonology.  Started on gentle IV fluids.  Following BMPs every 6 hours.  1 of 2 initial blood cultures positive for strep species suspect contaminant.  Repeat blood culture pending.    Interval Followup: Patient sitting up in bed appears to be resting comfortable.  Patient denies any further chest pain denies shortness of breath denies  productive cough.     Review of Systems  Constitutional: Positive for fatigue and negative fever.   HENT: Negative for sore throat and trouble swallowing.    Eyes: Negative for pain and discharge.   Respiratory: Negative for cough and shortness of breath.    Cardiovascular: Negative for chest pain and palpitations.   Gastrointestinal: Negative for abdominal pain, nausea and vomiting.   Endocrine: Negative for cold intolerance and heat intolerance.   Genitourinary: Negative for difficulty urinating and dysuria.   Musculoskeletal: Negative for back pain and neck stiffness.   Skin: Negative for color change and rash.   Neurological: Negative for syncope and headaches.   Hematological: Negative for adenopathy.   Psychiatric/Behavioral: Negative for confusion and hallucinations.    Objective   Objective     Vitals:   Temp:  [97.2 °F (36.2 °C)-98 °F (36.7 °C)] 97.5 °F (36.4 °C)  Heart Rate:  [68-88] 77  Resp:  [17-20] 18  BP: ()/(44-69) 121/69  Flow (L/min):  [3] 3  Physical Exam   General: well-developed appearing stated age thin frail appearing in no acute distress  HEENT: Normocephalic atraumatic moist membranes pupils equal round, no scleral icterus no conjunctival injection  Cardiovascular: regular rate and rhythm no murmurs, no lower extremity edema appreciated  Pulmonary: Fair air movement bilaterally, to auscultation bilaterally, unlabored, no conversational dyspnea appreciated  Gastrointestinal: Soft nontender nondistended positive bowel sounds   Musculoskeletal: No clubbing cyanosis, warm and well-perfused, calves soft symmetric nontender bilaterally  Skin: Clean dry without rashes  Neuro: Cranial nerves II through XII intact grossly, moving all extremities equally-no focal weakness appreciated  Psych: Patient is calm cooperative and appropriate with exam not responding to internal stimuli  : No Nye catheter no bladder distention no suprapubic tenderness    Result Review    Result Review:  I have  personally reviewed these results and agree with these findings:  [x]  Laboratory  LAB RESULTS:      Lab 12/27/23  0632 12/26/23  0159 12/26/23  0046 12/25/23 2152 12/25/23 1927   WBC 5.75 4.53  --   --  5.17   HEMOGLOBIN 13.3 13.8  --   --  13.3   HEMATOCRIT 39.0 39.7  --   --  38.4   PLATELETS 331 311  --   --  321   NEUTROS ABS  --  3.04  --   --  3.18   IMMATURE GRANS (ABS)  --  0.03  --   --  0.01   LYMPHS ABS  --  0.73  --   --  0.90   MONOS ABS  --  0.65  --   --  0.85   EOS ABS  --  0.06  --   --  0.20   MCV 92.6 91.5  --   --  91.4   PROCALCITONIN  --   --   --  2.44*  --    LACTATE  --   --  1.0  --   --          Lab 12/28/23  0615 12/28/23  0031 12/27/23  1838 12/27/23  1147 12/27/23  0632 12/26/23 2156 12/26/23 0159 12/25/23 1927   SODIUM 127* 127* 126* 128* 126*   < > 120* 122*   POTASSIUM 3.7 3.5 3.9 3.9 4.0   < > 4.2 4.3   CHLORIDE 93* 92* 90* 91* 92*   < > 89* 89*   CO2 23.2 24.0 25.1 22.7 24.7   < > 18.8* 19.7*   ANION GAP 10.8 11.0 10.9 14.3 9.3   < > 12.2 13.3   BUN 7* 9 9 7* 5*   < > 8 7*   CREATININE 0.57* 0.67* 0.66* 0.58* 0.55*   < > 0.53* 0.61*   EGFR 108.1 103.0 103.4 107.6 109.3   < > 110.5 105.9   GLUCOSE 87 91 89 80 83   < > 93 100*   CALCIUM 8.6 8.6 8.9 8.9 9.0   < > 8.8 9.0   MAGNESIUM  --   --   --   --  1.9  --  1.7 1.7   PHOSPHORUS  --   --   --   --  3.3  --  3.6  --    HEMOGLOBIN A1C  --   --   --   --   --   --   --  5.40    < > = values in this interval not displayed.         Lab 12/26/23  0159 12/25/23 1927   TOTAL PROTEIN 7.1 7.2   ALBUMIN 3.5 3.5   GLOBULIN 3.6 3.7   ALT (SGPT) 10 10   AST (SGOT) 26 29   BILIRUBIN 0.4 0.3   ALK PHOS 88 89   LIPASE  --  22         Lab 12/25/23 2152 12/25/23 1927   PROBNP  --  285.8   HSTROP T 11 11         Lab 12/25/23 1927   CHOLESTEROL 116   LDL CHOL 70   HDL CHOL 32*   TRIGLYCERIDES 68             Brief Urine Lab Results  (Last result in the past 365 days)        Color   Clarity   Blood   Leuk Est   Nitrite   Protein   CREAT   [place]  [facility] [place]        12/26/23 0159             54.0               Microbiology Results (last 10 days)       Procedure Component Value - Date/Time    Blood Culture - Blood, Arm, Left [[id number]]  (Normal) Collected: 12/26/23 2008    Lab Status: Preliminary result Specimen: Blood from Arm, Left Updated: 12/27/23 2100     Blood Culture No growth at 24 hours    Blood Culture - Blood, Hand, Left [[id number]]  (Normal) Collected: 12/26/23 2008    Lab Status: Preliminary result Specimen: Blood from Hand, Left Updated: 12/27/23 2100     Blood Culture No growth at 24 hours    S. Pneumo Ag Urine or CSF - Urine, Urine, Clean Catch [[id number]]  (Normal) Collected: 12/26/23 0159    Lab Status: Final result Specimen: Urine, Clean Catch Updated: 12/26/23 0230     Strep Pneumo Ag Negative    Legionella Antigen, Urine - Urine, Urine, Clean Catch [[id number]]  (Normal) Collected: 12/26/23 0159    Lab Status: Final result Specimen: Urine, Clean Catch Updated: 12/26/23 0229     LEGIONELLA ANTIGEN, URINE Negative    Respiratory Culture - Sputum, Cough [[id number]] Collected: 12/26/23 0159    Lab Status: Final result Specimen: Sputum from Cough Updated: 12/26/23 0307     Respiratory Culture Rejected     Gram Stain Moderate (3+) WBCs seen      Few (2+) Epithelial cells seen     Comment: Modified report. Previous result was Moderate (3+) Epithelial cells seen on 12/26/2023 at 0303 EST.         Moderate (3+) Gram positive cocci in pairs, chains and clusters    Narrative:      Specimen rejected due to oropharyngeal contamination. Please reorder and recollect specimen if clinically necessary.    Blood Culture - Blood, Arm, Right [[id number]]  (Abnormal) Collected: 12/26/23 0130    Lab Status: Final result Specimen: Blood from Arm, Right Updated: 12/27/23 0639     Blood Culture Streptococcus, Alpha Hemolytic     Isolated from Anaerobic Bottle     Gram Stain Anaerobic Bottle Gram positive cocci in chains    Narrative:      Probable contaminant requires  clinical correlation, susceptibility not performed unless requested by physician.    Blood Culture ID, PCR - Blood, Arm, Right [[id number]]  (Abnormal) Collected: 12/26/23 0130    Lab Status: Final result Specimen: Blood from Arm, Right Updated: 12/26/23 1850     BCID, PCR Streptococcus spp, not A, B, or pneumoniae. Identification by BCID2 PCR.     BOTTLE TYPE Anaerobic Bottle    Blood Culture - Blood, Arm, Left [[id number]]  (Normal) Collected: 12/26/23 0046    Lab Status: Preliminary result Specimen: Blood from Arm, Left Updated: 12/28/23 0100     Blood Culture No growth at 2 days    MRSA Screen, PCR (Inpatient) - Swab, Nares [[id number]]  (Normal) Collected: 12/26/23 0033    Lab Status: Final result Specimen: Swab from Nares Updated: 12/26/23 0158     MRSA PCR No MRSA Detected    Narrative:      The negative predictive value of this diagnostic test is high and should only be used to consider de-escalating anti-MRSA therapy. A positive result may indicate colonization with MRSA and must be correlated clinically.            [x]  Microbiology  [x]  Radiology  CT Chest Without Contrast Diagnostic    Result Date: 12/26/2023    1. There is a large mass that has developed in the right upper lobe.  There are calcifications within the mass.  While it is possible this could relate to granulomatous exposure or possibly sarcoid, a malignancy could not be excluded. 2. Calcified mediastinal and right hilar lymph nodes are suggested. 3. Calcification in what looks like probable soft tissue mass right upper quadrant abdomen 4. Bibasilar effusions 5. Emphysematous changes most marked upper lobes 6. Coronary artery calcification.  FNA of mass would be recommended.    LUKE MILLER MD       Electronically Signed and Approved By: LUKE MILLER MD on 12/26/2023 at 16:53             Cardiac Catheterization/Vascular Study    Addendum Date: 12/26/2023    OPERATORS Jeancarlos White M.D. (Attending Cardiologist)  PROCEDURES PERFORMED Ultrasound  guided Vascular access Left Heart Catheterization Coronary Angiogram PCI of left circumflex artery IVUS of left circumflex artery PCI of mid LAD IVUS of LAD Moderate sedation  INDICATIONS FOR PROCEDURE 68 years old man with multiple cardiovascular risk factors presented to the hospital with chest pain and dynamic ECG changes in the anterolateral leads.  After receiving nitroglycerin he became severely hypotensive into 70s systolic.  After emergent discussion of risk and benefit of the procedure he was brought to the Cath Lab for definitive coronary angiography.  PROCEDURE IN DETAIL Informed consent was obtained from the patient after explaining the risks, benefits, and alternative options of the procedure. After obtaining informed consent, the patient was brought to the cath lab and was prepped in a sterile fashion. Lidocaine 2% was used for local anesthesia into the right femoral access site. The right femoral artery was accessed with a micropuncture needle via modified Seldinger technique under ultrasound guidance. A 6F was inserted successfully. Afterwards, 6F JR4 and JL5 diagnostic catheters were advanced over a wire into the ascending aorta and were used to engage the ostia of the left main and RCA respectively. JR4 used to cross the AV and obtain LV pressures and gradient across the AV measured via pullback technique. Images of the right and left coronary systems were obtained.  HEMODYNAMICS  LV: 115/0, 3 mmHg AO: 119/69, 89 mmHg No significant gradient across the aortic valve during pullback of JR4 catheter.  LV gram was not performed during the study.  FINDINGS Coronary Angiogram Right dominant circulation Left main: Left main is a large caliber vessel which gives rise to the Left Anterior Descending and the Left circumflex.  Ostium of the left main has 20% stenosis. Left Anterior Descending Artery: LAD is a medium caliber vessel which gives rise to several septal perforators and several diagonal branches.   Mid LAD has 80% tubular stenosis.  JANELL-3 flow Left Circumflex: Left circumflex artery gives rise to obtuse marginals.  Proximal left circumflex has tubular 80% hazy appearing stenosis.  Mid segment of left circumflex at bifurcation with OM has a complex 70% stenosis in both segments.  JANELL-3 flow Right Coronary Artery: The RCA is a medium caliber vessel gives rise to PDA and PLV.  Mid RCA has diffuse 40% stenosis. Percutaneous coronary intervention Diagnostic coronary angiography suggested both left circumflex and LAD to be contributed to ischemia and symptoms.  Left circumflex being the worst of the 2 vessels. 100 units/kg of heparin was administered and ACT of more than 250 was documented. A 6 Japanese XB 4.0 guide catheter was used to engage the ostium of the left main coronary artery. 0.014 run-through wire was then advanced into the left circumflex artery. I then advanced intravascular ultrasound catheter into the proximal segment of left circumflex. IVUS showed proximal left circumflex to be 3 mm and mid left circumflex to be 3.5 mm in dimension.  IVUS also showed calcification with thrombus in the proximal left circumflex. The lesion in the proximal left circumflex was predilated with 2.5 x 12 mm compliant balloon at 12 lisa. This was followed by placement of 3 x 15 mm Xience magi point stent which was then postdilated with 3.25 x 12 mm noncompliant balloon at 14 lisa. I then noted a new 50% stenosis at the ostium of left circumflex artery which was treated with balloon angioplasty by a 3 x 12 mm compliant balloon at 12 lisa. Final angiography showed 20-30% stenosis at the ostium of left circumflex artery.  JANELL-3 flow in the left circumflex artery and OM.  I did not treat mid left circumflex vessel as it was a complex bifurcating lesion which would have required more contrast and time while the patient was hypotensive receiving pressors and IV fluid. I then quickly diverted my attention to the LAD which was wired  with 0.014 run-through wire. Intravascular ultrasound catheter was then advanced into the mid LAD and a slow manual pullback was performed. IVUS showed LAD size to be 3.5 mm.  IVUS also confirmed 80% calcific stenosis in the mid LAD. I then predilated this lesion with 3.25 x 12 mm noncompliant balloon at 12 lisa. This was followed by placement of 3.25 x 33 mm Xience magi point stent. The stent was postdilated with 3.5 x 12 mm noncompliant balloon at 16 lisa. Final angiography showed 0% stenosis in LAD and JANELL-3 flow. All the catheters were exchanged over a wire and subsequently removed.  6 Monegasque introducer sheath was left in place to be pulled manually at a later time when ACT is less than 150  ESTIMATED BLOOD LOSS: 10 ml  COMPLICATIONS: None  IMPRESSIONS PCI of proximal left circumflex and mid LAD. Low LVEDP  RECOMMENDATIONS -Start dual antiplatelet therapy, high intensity statin and beta-blocker -Staged bifurcation PCI of mid left circumflex artery/OM -Smoking cessation -Obtain an echocardiogram -Cardiac rehab at the time of discharge. Electronically signed by Jeancarlos White MD, 12/25/23, 9:52 PM EST.       Addendum Date: 12/26/2023    OPERATORS Jeancarlos White M.D. (Attending Cardiologist)  PROCEDURES PERFORMED Ultrasound guided Vascular access Left Heart Catheterization Coronary Angiogram PCI of left circumflex artery IVUS of left circumflex artery PCI of mid LAD IVUS of LAD Moderate sedation  INDICATIONS FOR PROCEDURE 68 years old man with multiple cardiovascular risk factors presented to the hospital with chest pain and dynamic ECG changes in the anterolateral leads.  After receiving nitroglycerin he became severely hypotensive into 70s systolic.  After emergent discussion of risk and benefit of the procedure he was brought to the Cath Lab for definitive coronary angiography.  PROCEDURE IN DETAIL Informed consent was obtained from the patient after explaining the risks, benefits, and alternative options of the  procedure. After obtaining informed consent, the patient was brought to the cath lab and was prepped in a sterile fashion. Lidocaine 2% was used for local anesthesia into the right femoral access site. The right femoral artery was accessed with a micropuncture needle via modified Seldinger technique under ultrasound guidance. A 6F was inserted successfully. Afterwards, 6F JR4 and JL5 diagnostic catheters were advanced over a wire into the ascending aorta and were used to engage the ostia of the left main and RCA respectively. JR4 used to cross the AV and obtain LV pressures and gradient across the AV measured via pullback technique. Images of the right and left coronary systems were obtained.  HEMODYNAMICS  LV: 115/0, 3 mmHg AO: 119/69, 89 mmHg No significant gradient across the aortic valve during pullback of JR4 catheter.  LV gram was not performed during the study.  FINDINGS Coronary Angiogram Right dominant circulation Left main: Left main is a large caliber vessel which gives rise to the Left Anterior Descending and the Left circumflex.  Ostium of the left main has 20% stenosis. Left Anterior Descending Artery: LAD is a medium caliber vessel which gives rise to several septal perforators and several diagonal branches.  Mid LAD has 80% tubular stenosis.  JANELL-3 flow Left Circumflex: Left circumflex artery gives rise to obtuse marginals.  Proximal left circumflex has tubular 80% hazy appearing stenosis.  Mid segment of left circumflex at bifurcation with OM has a complex 70% stenosis in both segments.  JANELL-3 flow Right Coronary Artery: The RCA is a medium caliber vessel gives rise to PDA and PLV.  Mid RCA has diffuse 40% stenosis. Percutaneous coronary intervention Diagnostic coronary angiography suggested both left circumflex and LAD to be contributed to ischemia and symptoms.  Left circumflex being the worst of the 2 vessels. 100 units/kg of heparin was administered and ACT of more than 250 was documented.  0.014 run-through wire was then advanced into the left circumflex artery. I then advanced intravascular ultrasound catheter into the proximal segment of left circumflex. IVUS showed proximal left circumflex to be 3 mm and mid left circumflex to be 3.5 mm in dimension.  IVUS also showed calcification with thrombus in the proximal left circumflex. The lesion in the proximal left circumflex was predilated with 2.5 x 12 mm compliant balloon at 12 lisa. This was followed by placement of 3 x 15 mm Xience magi point stent which was then postdilated with 3.25 x 12 mm noncompliant balloon at 14 lisa. I then noted a new 50% stenosis at the ostium of left circumflex artery which was treated with balloon angioplasty by a 3 x 12 mm compliant balloon at 12 lisa. Final angiography showed 20-30% stenosis at the ostium of left circumflex artery.  JANELL-3 flow in the left circumflex artery and OM.  I did not treat mid left circumflex vessel as it was a complex bifurcating lesion which would have required more contrast and time while the patient was hypotensive receiving pressors and IV fluid. I then quickly diverted my attention to the LAD which was wired with 0.014 run-through wire. Intravascular ultrasound catheter was then advanced into the mid LAD and a slow manual pullback was performed. IVUS showed LAD size to be 3.5 mm.  IVUS also confirmed 80% calcific stenosis in the mid LAD. I then predilated this lesion with 3.25 x 12 mm noncompliant balloon at 12 lisa. This was followed by placement of 3.25 x 33 mm Xience magi point stent. The stent was postdilated with 3.5 x 12 mm noncompliant balloon at 16 lisa. Final angiography showed 0% stenosis in LAD and JANELL-3 flow. All the catheters were exchanged over a wire and subsequently removed.  6 Belarusian introducer sheath was left in place to be pulled manually at a later time when ACT is less than 150  ESTIMATED BLOOD LOSS: 10 ml  COMPLICATIONS: None  IMPRESSIONS PCI of proximal left circumflex  and mid LAD. Low LVEDP  RECOMMENDATIONS -Start dual antiplatelet therapy, high intensity statin and beta-blocker -Staged bifurcation PCI of mid left circumflex artery/OM -Smoking cessation -Obtain an echocardiogram -Cardiac rehab at the time of discharge. Electronically signed by Jeancarlos White MD, 12/25/23, 9:52 PM EST.       XR Chest 1 View    Result Date: 12/25/2023   Consolidation within right upper lung, concerning for pneumonia.  Recommend follow-up CXR in 4-6 weeks to document resolution.       BLAIR ROBERSON MD       Electronically Signed and Approved By: BLAIR ROBERSON MD on 12/25/2023 at 19:53              [x]  EKG/Telemetry   [x]  Cardiology/Vascular   Echocardiogram 12/26/2023    Left ventricular ejection fraction appears to be 51 - 55%.    Left ventricular diastolic function is consistent with (grade I) impaired relaxation and age.    Estimated right ventricular systolic pressure from tricuspid regurgitation is moderately elevated (45-55 mmHg).    Mild to moderate tricuspid regurgitation.  []  Pathology  []  Old records  [x]  Other:  Scheduled Meds:amLODIPine, 10 mg, Oral, Daily  ampicillin-sulbactam, 3 g, Intravenous, Q6H  aspirin, 81 mg, Oral, Daily  atorvastatin, 40 mg, Oral, Nightly  gabapentin, 600 mg, Oral, Q8H  lisinopril, 10 mg, Oral, Q24H  metoprolol succinate XL, 25 mg, Oral, Q24H  ticagrelor, 90 mg, Oral, BID      Continuous Infusions:     PRN Meds:.  acetaminophen    atropine    diphenhydrAMINE    HYDROcodone-acetaminophen    ipratropium-albuterol    nicotine    nitroglycerin    nitroglycerin    ondansetron ODT **OR** ondansetron    sodium chloride    sodium chloride      Assessment & Plan   Assessment / Plan     Assessment/Plan:  Chest pain  STEMI s/p THERON to proximal left circumflex and mid LAD   Coronary artery disease with residual distal left circumflex stenosis of 70 to 75%.  Right Upper lobe lung mass concerning for malignancy  Acute hypoxic respiratory failure  Acute cardiogenic  pulmonary edema with bilateral pleural effusions  Suspected community-acquired pneumonia  Suspected blood culture contaminant 1 of 2 positive for strep species not A B or pneumoniae  Moderate hyponatremia, clinically significant DDx likely due to dehydration and concern for possible SIADH due to pulmonary malignancy  Hypertension  COPD without acute exacerbation  Current active tobacco use  Severe protein calorie malnutrition        Patient admitted for further evaluation and treatment  Pulmonology critical care and cardiology consulted thank you for your assistance  Status post PCI as above  Continue aspirin, Brilinta and atorvastatin  Continue metoprolol lisinopril  Plan for outpatient follow-up with cardiology for further intervention on residual distal left circumflex stenosis  Further workup of right upper lobe lung mass per pulmonology as outpatient  Continue Rocephin azithromycin plan to discuss with pulmonology de-escalation tomorrow  Repeat blood culture pending though suspect contaminant based on current speciation 1 out of 2 bottles  Status post IV fluids  Status post Lasix 40 mg IV x 1   Weaned will need home O2 at discharge.  O2 as tolerated.    Continue amlodipine  Continue as needed DuoNebs  Patient counseled the importance of tobacco cessation  Will provide supplemental nicotine as needed  Continue protein supplementation with meals  Further inpatient orders recommendations pending clinical course         Discussed plan with bedside RN, cardiology as well as pulmonology critical care team.    Disposition: Plans for discharge home on today however patient is requiring supplemental O2 as per desat study which was performed.  Case management is working to coordinate discharge planning, unfortunately [facility] will be unable to [facility] with [facility] until tomorrow.  Hopefully will be able to discharge in the a.m.    DVT prophylaxis:  Mechanical DVT prophylaxis orders are present.    CODE STATUS:   Level  Of Support Discussed With: Patient  Code Status (Patient has no pulse and is not breathing): CPR (Attempt to Resuscitate)  Medical Interventions (Patient has pulse or is breathing): Full Support

## 2023-12-28 NOTE — PLAN OF CARE
Goal Outcome Evaluation:      Patient A&O x4. VS stable. Patient voiced c/o pain x2 on shift. Medicated per MAR with relief. All meds and antibiotics given per MAR. No significant changes or events to report. All needs met at this time. Plan of care ongoing.

## 2023-12-28 NOTE — PLAN OF CARE
Goal Outcome Evaluation:      Patient A/O x 4. VSS. Medications and antibiotics administered per MAR. Pain 8/10 reported to patient back. PRN Norco administered per MAR. Patient verbalized improvement in pain. Plan was to discharge patient home today. Respiratory therapy took patient on walk test today. Patient failed walk test and requires at home oxygen. Patient discharge was placed for today to go home on oxygen. Due to patient having [facility] insurance, at home oxygen could not be set up when discharged was placed at 1430. For [facility] to approve at home oxygen, a walk test and room air ABG must be obtained. MD called. ABG was ordered and obtained on patient. Plan of care is to try to wean patient off oxygen before morning.  Patient weaned to 0.5L nasal cannula on shift. O2 saturations remained at 90-91%. If patient cannot be weaned, [facility] should approve the oxygen at home in the morning and patient discharge in late afternoon.        Progress: improving            Problem: Adult Inpatient Plan of Care  Goal: Plan of Care Review  Outcome: Ongoing, Progressing  Flowsheets  Taken 12/28/2023 1822 by Gino Li RN  Progress: improving  Taken 12/27/2023 0513 by Kya García RN  Plan of Care Reviewed With: patient  Goal: Patient-Specific Goal (Individualized)  Outcome: Ongoing, Progressing  Goal: Absence of Hospital-Acquired Illness or Injury  Outcome: Ongoing, Progressing  Intervention: Identify and Manage Fall Risk  Recent Flowsheet Documentation  Taken 12/28/2023 1730 by Gino Li RN  Safety Promotion/Fall Prevention:   safety round/check completed   room organization consistent  Taken 12/28/2023 1523 by Gino Li RN  Safety Promotion/Fall Prevention:   safety round/check completed   room organization consistent  Taken 12/28/2023 1323 by Gino Li, RN  Safety Promotion/Fall Prevention:   safety round/check completed   room organization consistent  Taken 12/28/2023 1123 by Gino Li  RN  Safety Promotion/Fall Prevention:   safety round/check completed   room organization consistent  Taken 12/28/2023 0930 by Gino Li RN  Safety Promotion/Fall Prevention:   safety round/check completed   room organization consistent  Taken 12/28/2023 0811 by Gino Li RN  Safety Promotion/Fall Prevention:   safety round/check completed   room organization consistent  Taken 12/28/2023 0730 by Gino Li RN  Safety Promotion/Fall Prevention:   safety round/check completed   room organization consistent  Intervention: Prevent Infection  Recent Flowsheet Documentation  Taken 12/28/2023 1730 by Gino Li RN  Infection Prevention:   single patient room provided   rest/sleep promoted   hand hygiene promoted  Taken 12/28/2023 1523 by Gino Li RN  Infection Prevention:   single patient room provided   rest/sleep promoted   hand hygiene promoted  Taken 12/28/2023 1323 by Gino Li RN  Infection Prevention:   single patient room provided   rest/sleep promoted   hand hygiene promoted  Taken 12/28/2023 1123 by Gino Li RN  Infection Prevention:   single patient room provided   rest/sleep promoted   hand hygiene promoted  Taken 12/28/2023 0930 by Gino Li RN  Infection Prevention:   single patient room provided   rest/sleep promoted   hand hygiene promoted  Taken 12/28/2023 0811 by Gino Li RN  Infection Prevention:   single patient room provided   rest/sleep promoted   hand hygiene promoted  Taken 12/28/2023 0730 by Gino Li RN  Infection Prevention:   single patient room provided   rest/sleep promoted   hand hygiene promoted  Goal: Optimal Comfort and Wellbeing  Outcome: Ongoing, Progressing  Intervention: Provide Person-Centered Care  Recent Flowsheet Documentation  Taken 12/28/2023 0811 by Gino Li RN  Trust Relationship/Rapport:   care explained   choices provided   emotional support provided   empathic listening  provided   questions answered   questions encouraged   reassurance provided   thoughts/feelings acknowledged  Goal: Readiness for Transition of Care  Outcome: Ongoing, Progressing     Problem: Skin Injury Risk Increased  Goal: Skin Health and Integrity  Outcome: Ongoing, Progressing     Problem: Fall Injury Risk  Goal: Absence of Fall and Fall-Related Injury  Outcome: Ongoing, Progressing  Intervention: Promote Injury-Free Environment  Recent Flowsheet Documentation  Taken 12/28/2023 1730 by Gino Li RN  Safety Promotion/Fall Prevention:   safety round/check completed   room organization consistent  Taken 12/28/2023 1523 by Gino Li RN  Safety Promotion/Fall Prevention:   safety round/check completed   room organization consistent  Taken 12/28/2023 1323 by Gino Li RN  Safety Promotion/Fall Prevention:   safety round/check completed   room organization consistent  Taken 12/28/2023 1123 by Gino Li RN  Safety Promotion/Fall Prevention:   safety round/check completed   room organization consistent  Taken 12/28/2023 0930 by Gino Li RN  Safety Promotion/Fall Prevention:   safety round/check completed   room organization consistent  Taken 12/28/2023 0811 by Gino Li RN  Safety Promotion/Fall Prevention:   safety round/check completed   room organization consistent  Taken 12/28/2023 0730 by Gino Li RN  Safety Promotion/Fall Prevention:   safety round/check completed   room organization consistent

## 2023-12-28 NOTE — PROGRESS NOTES
Pulmonary / Critical Care Progress Note      Patient Name: Hermelindo Zuñiga  : 1955  MRN: [id number]  Attending:  Steve Kendall, *  Date of admission: 2023    Subjective   Subjective   Follow-up for lung mass    Over past 24 hours:  Responded well to diuretics  Dyspnea and orthopnea improved  Potassium low this morning  Sodium low but overall improved  Less weak and fatigue  No chest pain or hemoptysis  No nausea, fevers or chills      Objective   Objective     Vitals:   Temp:  [97.2 °F (36.2 °C)-98 °F (36.7 °C)] 97.5 °F (36.4 °C)  Heart Rate:  [68-88] 77  Resp:  [18-20] 20  BP: ()/(44-69) 121/66  Flow (L/min):  [1-3] 1    Physical Exam   Vital Signs Reviewed   General: Frail chronically ill-appearing male, Alert, NAD.    HEENT:  PERRL, EOMI.  OP, nares clear  Chest:  good aeration, barrel chested, decreasing bilaterally, tympanic to percussion bilaterally, no work of breathing noted  CV: RRR, no MGR, pulses 2+, equal.  Abd:  Soft, NT, ND, + BS, no HSM  EXT:  no clubbing, no cyanosis, no edema, muscle wasting x 4 extremities  Neuro:  A&Ox3, CN grossly intact, no focal deficits.  Skin: No rashes or lesions noted      Result Review    Result Review:  I have personally reviewed the results from the time of this admission to 2023 13:57 EST and agree with these findings:  [x]  Laboratory  [x]  Microbiology  [x]  Radiology  [x]  EKG/Telemetry   [x]  Cardiology/Vascular   []  Pathology  []  Old records  []  Other:  Most notable findings include:       Lab 23  0615 23  0031 23  1838 23  1147 23  0632 23  0043 23  2156 23  0159 23  1927   WBC  --   --   --   --  5.75  --   --  4.53 5.17   HEMOGLOBIN  --   --   --   --  13.3  --   --  13.8 13.3   HEMATOCRIT  --   --   --   --  39.0  --   --  39.7 38.4   PLATELETS  --   --   --   --  331  --   --  311 321   SODIUM 127* 127* 126* 128* 126* 122* 120* 120* 122*   POTASSIUM 3.7 3.5 3.9  3.9 4.0 3.7 3.9 4.2 4.3   CHLORIDE 93* 92* 90* 91* 92* 89* 89* 89* 89*   CO2 23.2 24.0 25.1 22.7 24.7 20.2* 20.6* 18.8* 19.7*   BUN 7* 9 9 7* 5* 7* 8 8 7*   CREATININE 0.57* 0.67* 0.66* 0.58* 0.55* 0.46* 0.52* 0.53* 0.61*   GLUCOSE 87 91 89 80 83 102* 102* 93 100*   CALCIUM 8.6 8.6 8.9 8.9 9.0 8.6 8.6 8.8 9.0   PHOSPHORUS  --   --   --   --  3.3  --   --  3.6  --    TOTAL PROTEIN  --   --   --   --   --   --   --  7.1 7.2   ALBUMIN  --   --   --   --   --   --   --  3.5 3.5   GLOBULIN  --   --   --   --   --   --   --  3.6 3.7     CT Chest Without Contrast Diagnostic    Result Date: 12/26/2023  PROCEDURE: CT CHEST WO CONTRAST DIAGNOSTIC  COMPARISON: [facility], , XR CHEST 1 VW, 12/25/2023, 19:36.  [facility], , CHEST AP/PA 1 VIEW, 4/15/2013, 7:42.  INDICATIONS: abnorml CXR  TECHNIQUE: CT images were created without the administration of contrast material.   PROTOCOL:   ION Protocol    RADIATION:   DLP: 117 mGy*cm   Automated exposure control was utilized to minimize radiation dose.  FINDINGS:  There are emphysematous changes.  There is a mass in the right upper lobe which has developed since 2013. There are calcifications within this mass.  This extends to the hilar area.  This measures about 7.6 by 7.4 cm.  There is calcification in precarinal, right hilar and subcarinal lymph nodes.  There is coronary artery calcification.  There are bilateral pleural effusions.  There are emphysematous changes with bulla most pronounced right apical area.  Lower slices through the upper abdomen reveal some soft tissue fullness in the right upper quadrant with calcification.       Impression:   1. There is a large mass that has developed in the right upper lobe.  There are calcifications within the mass.  While it is possible this could relate to granulomatous exposure or possibly sarcoid, a malignancy could not be excluded. 2. Calcified mediastinal and right hilar lymph nodes are suggested. 3.  Calcification in what looks like probable soft tissue mass right upper quadrant abdomen 4. Bibasilar effusions 5. Emphysematous changes most marked upper lobes 6. Coronary artery calcification.  FNA of mass would be recommended.    LUKE MILLER MD       Electronically Signed and Approved By: LUKE MILLER MD on 12/26/2023 at 16:53                Assessment & Plan   Assessment / Plan     Active Hospital Problems:  Active Hospital Problems    Diagnosis     **STEMI (ST elevation myocardial infarction)     Severe malnutrition      Impression:  Lung mass highly concerning for primary bronchogenic carcinoma.  Could potentially be invasive fungal infection as well  Euvolemic hyponatremia, clinically insignificant.  Question paraneoplastic acute hypoxemic respiratory failure  Acute cardiogenic pulmonary edema  Small bilateral pleural effusions  Possible community-acquired pneumonia from unspecified organism  COPD without exacerbation  Severe protein calorie malnutrition with muscle wasting    Plan:  I did review the patient's chest CT.  Does have a right upper lobe lung mass.  Highly concerning for primary lung cancer in the setting of hyponatremia and a smoking history.  Could potentially be an invasive fungal infection given its appearance.  Tissue biopsy would be of the utmost importance.    Will let him recover from his STEMI and plan on doing a lung biopsy 4 to 6 weeks from now as an outpatient.  After discussion with cardiology likely will admit the patient and hold Brilinta for 5 days and bridged with either heparin or cangrelor and then proceed with robotic navigational bronchoscopy.  Otherwise we will have to wait till he is on Brilinta x 3 months to consider holding it and if this is cancer that is too long to wait  We will see the patient in the office and get a PET/CT and plan for biopsy  Redosed Lasix 40 mg IV x 1  Trend renal panel and electrolytes.  Replace potassium orally  Does have hyponatremia.  Could be  paraneoplastic that does have lung cancer.  Monitor closely.  Improved some after Lasix   Fungal serologies pending  No indication to drain pleural effusions at this time.  They are tiny.  Will diurese and monitor at this time  Wean O2 to keep SPO2 greater than 90%    DVT prophylaxis:  Mechanical DVT prophylaxis orders are present.    CODE STATUS:   Level Of Support Discussed With: Patient  Code Status (Patient has no pulse and is not breathing): CPR (Attempt to Resuscitate)  Medical Interventions (Patient has pulse or is breathing): Full Support      Labs, imaging, microbiology, notes and medications personally reviewed  Discussed with primary    Electronically signed by Carl Castellanos MD, 12/28/23, 1:58 PM EST.

## 2023-12-28 NOTE — DISCHARGE SUMMARY
[facility]         HOSPITALIST  DISCHARGE SUMMARY    Patient Name: Hermelindo Zuñiga  : 1955  MRN: [id number]    Date of Admission: 2023  Date of Discharge: 2023  Primary Care Physician: Antony Florence MD    Consults       Date and Time Order Name Status Description    2023  7:33 PM Cardiology - Interventional (on-call MD unless specified) Completed             Active and Resolved Hospital Problems:  Coronary artery disease with residual distal left circumflex stenosis of 70 to 75%.  Right Upper lobe lung mass concerning for malignancy  Acute hypoxic respiratory failure  Acute cardiogenic pulmonary edema with bilateral pleural effusions  Suspected community-acquired pneumonia  Suspected blood culture contaminant 1 of 2 positive for strep species not A B or pneumoniae  Moderate hyponatremia, clinically significant DDx likely due to dehydration and concern for possible SIADH due to pulmonary malignancy  Hypertension  COPD without acute exacerbation  Current active tobacco use      Hospital Course     Hospital Course:Hermelindo Zuñiga is a 68 y.o. male with past medical history of hypertension, COPD, current active tobacco use presented to the ED for evaluation of substernal chest pain that started few hours ago prior to arrival to the ED. pain radiating to the left arm and was associated with nausea, vomiting, mild shortness of breath.  Patient had a syncopal episode while he was in the ambulance. In the ED, patient afebrile, heart rate within normal limits, respiratory rate within normal limits, blood pressure borderline low, requiring 4 L nasal cannula keep sats greater than 90%.  Serum sodium found to be low at 122.  EKG positive for ischemic changes concerning for STEMI. STEMI alert was activated, cardiology consulted.  Patient underwent cardiac cath with 2 THERON placed in proximal left circumflex and mid LAD.  Patient noted to have residual distal left  circumflex stenosis 70-75% range.  Cardiology planning on further intervention as an outpatient following discharge.  Tolerated the procedure well and patient transferred to the ICU for further management.  Hospitalist service was consulted for further management. Started on dual antiplatelet therapy, metoprolol, atorvastatin.  Patient started on empiric antibiotics for possible pneumonia.  CT of the chest performed which demonstrated large mass in the right upper lobe.  Further workup per pulmonology postdischarge carding these mass.  Patient was started on gentle IV fluids.  Sodium improved.  She was found to have 1 of 2 initial blood cultures positive for strep species suspect contaminant.  Repeat blood culture pending at discharge but no growth to date.    The patient was still requiring 1 L of O2 to maintain oxygenation at 89% or greater at discharge.  However per [facility] recall the patient did not qualify for supplemental oxygen and thus they would not provide this for him.    DISCHARGE Follow Up Recommendations for labs and diagnostics: BMP in 1 week    Day of Discharge     Vital Signs: Reviewed     Physical Exam:   General: well-developed appearing stated age thin frail appearing in no acute distress  Cardiovascular: regular rate and rhythm no murmurs, no lower extremity edema appreciated  Pulmonary: Fair air movement bilaterally, to auscultation bilaterally, unlabored, no conversational dyspnea appreciated  Gastrointestinal: Soft nontender nondistended positive bowel sounds   Musculoskeletal: No clubbing cyanosis, warm and well-perfused, calves soft symmetric nontender bilaterally  Neuro: Cranial nerves II through XII intact grossly, moving all extremities equally-no focal weakness appreciated  Psych: Patient is calm cooperative and appropriate with exam not responding to internal stimuli    Discharge Details        Discharge Medications        New Medications        Instructions Start Date   amLODIPine 10 MG  tablet  Commonly known as: NORVASC   10 mg, Oral, Daily      amoxicillin-clavulanate 875-125 MG per tablet  Commonly known as: AUGMENTIN   1 tablet, Oral, 2 Times Daily      aspirin 81 MG EC tablet   81 mg, Oral, Daily      atorvastatin 40 MG tablet  Commonly known as: LIPITOR   40 mg, Oral, Nightly      metoprolol succinate XL 25 MG 24 hr tablet  Commonly known as: TOPROL-XL   25 mg, Oral, Every 24 Hours Scheduled      nicotine 14 MG/24HR patch  Commonly known as: NICODERM CQ   1 patch, Transdermal, Daily PRN      nitroglycerin 0.4 MG SL tablet  Commonly known as: NITROSTAT   0.4 mg, Sublingual, Every 5 Minutes PRN, Take no more than 3 doses in 15 minutes.      ticagrelor 90 MG tablet tablet  Commonly known as: BRILINTA   90 mg, Oral, 2 Times Daily             Changes to Medications        Instructions Start Date   lisinopril 10 MG tablet  Commonly known as: PRINIVILZESTRIL  What changed:   medication strength  how much to take   10 mg, Oral, Daily, Script is written to take one-half tablet by mouth daily for blood pressure but pt said he takes a full tab             Continue These Medications        Instructions Start Date   acyclovir 400 MG tablet  Commonly known as: ZOVIRAX   400 mg, Oral, 5 Times Daily, Take no more than 5 doses a day.      albuterol sulfate  (90 Base) MCG/ACT inhaler  Commonly known as: PROVENTIL HFA;VENTOLIN HFA;PROAIR HFA   2 puffs, Inhalation, Every 4 Hours PRN      cholecalciferol 25 MCG (1000 UT) tablet  Commonly known as: VITAMIN D3   2,000 Units, Oral, Daily      gabapentin 600 MG tablet  Commonly known as: NEURONTIN   1.5 tablets, Oral, 3 Times Daily      HYDROcodone-acetaminophen  MG per tablet  Commonly known as: NORCO   1 tablet, Oral, Every 8 Hours PRN      ondansetron ODT 4 MG disintegrating tablet  Commonly known as: ZOFRAN-ODT   4 mg, Translingual, Every 8 Hours PRN             Stop These Medications      ibuprofen 200 MG tablet  Commonly known as: ADVIL,MOTRIN               No Known Allergies    Discharge Disposition:  Home or Self Care    Diet:  Hospital:  No active diet order      Discharge Activity:   Activity Instructions       Other Activity Instructions      Activity Instructions: As tolerated, avoid strenuous activity            CODE STATUS:  Code Status and Medical Interventions:   Ordered at: 12/25/23 2122     Level Of Support Discussed With:    Patient     Code Status (Patient has no pulse and is not breathing):    CPR (Attempt to Resuscitate)     Medical Interventions (Patient has pulse or is breathing):    Full Support         No future appointments.    Additional Instructions for the Follow-ups that You Need to Schedule       Call MD With Problems / Concerns   As directed      Instructions: Recurrent chest pain or worsening shortness of breath    Order Comments: Instructions: Recurrent chest pain or worsening shortness of breath         Discharge Follow-up with PCP   As directed       Currently Documented PCP:    Antony Florence MD    PCP Phone Number:    [phone]     Follow Up Details: hospital follow up 1 week        Basic Metabolic Panel    Jan 04, 2024 (Approximate)      Release to patient: Routine Release                Pertinent  and/or Most Recent Results     PROCEDURES:     Ultrasound guided Vascular access  Left Heart Catheterization  Coronary Angiogram   PCI of left circumflex artery  IVUS of left circumflex artery  PCI of mid LAD   IVUS of LAD  Moderate sedation      IMPRESSIONS  PCI of proximal left circumflex and mid LAD.  Low LVEDP     RECOMMENDATIONS  -Start dual antiplatelet therapy, high intensity statin and beta-blocker  -Staged bifurcation PCI of mid left circumflex artery/OM  -Smoking cessation  -Obtain an echocardiogram  -Cardiac rehab at the time of discharge.    LAB RESULTS:      Lab 12/27/23  0632 12/26/23  0159 12/26/23  0046 12/25/23  2152 12/25/23  1927   WBC 5.75 4.53  --   --  5.17   HEMOGLOBIN 13.3 13.8  --   --  13.3    HEMATOCRIT 39.0 39.7  --   --  38.4   PLATELETS 331 311  --   --  321   NEUTROS ABS  --  3.04  --   --  3.18   IMMATURE GRANS (ABS)  --  0.03  --   --  0.01   LYMPHS ABS  --  0.73  --   --  0.90   MONOS ABS  --  0.65  --   --  0.85   EOS ABS  --  0.06  --   --  0.20   MCV 92.6 91.5  --   --  91.4   PROCALCITONIN  --   --   --  2.44*  --    LACTATE  --   --  1.0  --   --          Lab 12/29/23  0449 12/28/23  0615 12/28/23  0031 12/27/23  1838 12/27/23  1147 12/27/23  0632 12/26/23  2156 12/26/23  0159 12/25/23  1927   SODIUM 127* 127* 127* 126* 128* 126*   < > 120* 122*   POTASSIUM 4.1 3.7 3.5 3.9 3.9 4.0   < > 4.2 4.3   CHLORIDE 90* 93* 92* 90* 91* 92*   < > 89* 89*   CO2 27.0 23.2 24.0 25.1 22.7 24.7   < > 18.8* 19.7*   ANION GAP 10.0 10.8 11.0 10.9 14.3 9.3   < > 12.2 13.3   BUN 7* 7* 9 9 7* 5*   < > 8 7*   CREATININE 0.65* 0.57* 0.67* 0.66* 0.58* 0.55*   < > 0.53* 0.61*   EGFR 103.9 108.1 103.0 103.4 107.6 109.3   < > 110.5 105.9   GLUCOSE 116* 87 91 89 80 83   < > 93 100*   CALCIUM 9.2 8.6 8.6 8.9 8.9 9.0   < > 8.8 9.0   MAGNESIUM  --   --   --   --   --  1.9  --  1.7 1.7   PHOSPHORUS  --   --   --   --   --  3.3  --  3.6  --    HEMOGLOBIN A1C  --   --   --   --   --   --   --   --  5.40    < > = values in this interval not displayed.         Lab 12/26/23  0159 12/25/23 1927   TOTAL PROTEIN 7.1 7.2   ALBUMIN 3.5 3.5   GLOBULIN 3.6 3.7   ALT (SGPT) 10 10   AST (SGOT) 26 29   BILIRUBIN 0.4 0.3   ALK PHOS 88 89   LIPASE  --  22         Lab 12/25/23  2152 12/25/23 1927   PROBNP  --  285.8   HSTROP T 11 11         Lab 12/25/23 1927   CHOLESTEROL 116   LDL CHOL 70   HDL CHOL 32*   TRIGLYCERIDES 68             Lab 12/28/23  1522   PH, ARTERIAL 7.464*   PCO2, ARTERIAL 37.2   PO2 ART 60.0*   O2 SATURATION ART 91.2*   FIO2 21   HCO3 ART 26.1*   BASE EXCESS ART 2.5*   CARBOXYHEMOGLOBIN 0.4     Brief Urine Lab Results  (Last result in the past 365 days)        Color   Clarity   Blood   Leuk Est   Nitrite   Protein    CREAT   Urine HCG        12/26/23 0159             54.0               Microbiology Results (last 10 days)       Procedure Component Value - Date/Time    Blood Culture - Blood, Arm, Left [[id number]]  (Normal) Collected: 12/26/23 2008    Lab Status: Preliminary result Specimen: Blood from Arm, Left Updated: 12/30/23 2100     Blood Culture No growth at 4 days    Blood Culture - Blood, Hand, Left [[id number]]  (Normal) Collected: 12/26/23 2008    Lab Status: Preliminary result Specimen: Blood from Hand, Left Updated: 12/30/23 2100     Blood Culture No growth at 4 days    S. Pneumo Ag Urine or CSF - Urine, Urine, Clean Catch [[id number]]  (Normal) Collected: 12/26/23 0159    Lab Status: Final result Specimen: Urine, Clean Catch Updated: 12/26/23 0230     Strep Pneumo Ag Negative    Legionella Antigen, Urine - Urine, Urine, Clean Catch [[id number]]  (Normal) Collected: 12/26/23 0159    Lab Status: Final result Specimen: Urine, Clean Catch Updated: 12/26/23 0229     LEGIONELLA ANTIGEN, URINE Negative    Respiratory Culture - Sputum, Cough [[id number]] Collected: 12/26/23 0159    Lab Status: Final result Specimen: Sputum from Cough Updated: 12/26/23 0307     Respiratory Culture Rejected     Gram Stain Moderate (3+) WBCs seen      Few (2+) Epithelial cells seen     Comment: Modified report. Previous result was Moderate (3+) Epithelial cells seen on 12/26/2023 at 0303 EST.         Moderate (3+) Gram positive cocci in pairs, chains and clusters    Narrative:      Specimen rejected due to oropharyngeal contamination. Please reorder and recollect specimen if clinically necessary.    Blood Culture - Blood, Arm, Right [[id number]]  (Abnormal) Collected: 12/26/23 0130    Lab Status: Final result Specimen: Blood from Arm, Right Updated: 12/27/23 0639     Blood Culture Streptococcus, Alpha Hemolytic     Isolated from Anaerobic Bottle     Gram Stain Anaerobic Bottle Gram positive cocci in chains    Narrative:      Probable contaminant  requires clinical correlation, susceptibility not performed unless requested by physician.    Blood Culture ID, PCR - Blood, Arm, Right [[id number]]  (Abnormal) Collected: 12/26/23 0130    Lab Status: Final result Specimen: Blood from Arm, Right Updated: 12/26/23 1850     BCID, PCR Streptococcus spp, not A, B, or pneumoniae. Identification by BCID2 PCR.     BOTTLE TYPE Anaerobic Bottle    Blood Culture - Blood, Arm, Left [[id number]]  (Normal) Collected: 12/26/23 0046    Lab Status: Final result Specimen: Blood from Arm, Left Updated: 12/31/23 0100     Blood Culture No growth at 5 days    MRSA Screen, PCR (Inpatient) - Swab, Nares [[id number]]  (Normal) Collected: 12/26/23 0033    Lab Status: Final result Specimen: Swab from Nares Updated: 12/26/23 0158     MRSA PCR No MRSA Detected    Narrative:      The negative predictive value of this diagnostic test is high and should only be used to consider de-escalating anti-MRSA therapy. A positive result may indicate colonization with MRSA and must be correlated clinically.            CT Chest Without Contrast Diagnostic    Result Date: 12/26/2023    1. There is a large mass that has developed in the right upper lobe.  There are calcifications within the mass.  While it is possible this could relate to granulomatous exposure or possibly sarcoid, a malignancy could not be excluded. 2. Calcified mediastinal and right hilar lymph nodes are suggested. 3. Calcification in what looks like probable soft tissue mass right upper quadrant abdomen 4. Bibasilar effusions 5. Emphysematous changes most marked upper lobes 6. Coronary artery calcification.  FNA of mass would be recommended.    LUKE MILLER MD       Electronically Signed and Approved By: LUKE MILLER MD on 12/26/2023 at 16:53             Cardiac Catheterization/Vascular Study    Addendum Date: 12/26/2023    OPERATORS Jeancarlos White M.D. (Attending Cardiologist)  PROCEDURES PERFORMED Ultrasound guided Vascular access Left Heart  Catheterization Coronary Angiogram PCI of left circumflex artery IVUS of left circumflex artery PCI of mid LAD IVUS of LAD Moderate sedation  INDICATIONS FOR PROCEDURE 68 years old man with multiple cardiovascular risk factors presented to the hospital with chest pain and dynamic ECG changes in the anterolateral leads.  After receiving nitroglycerin he became severely hypotensive into 70s systolic.  After emergent discussion of risk and benefit of the procedure he was brought to the Cath Lab for definitive coronary angiography.  PROCEDURE IN DETAIL Informed consent was obtained from the patient after explaining the risks, benefits, and alternative options of the procedure. After obtaining informed consent, the patient was brought to the cath lab and was prepped in a sterile fashion. Lidocaine 2% was used for local anesthesia into the right femoral access site. The right femoral artery was accessed with a micropuncture needle via modified Seldinger technique under ultrasound guidance. A 6F was inserted successfully. Afterwards, 6F JR4 and JL5 diagnostic catheters were advanced over a wire into the ascending aorta and were used to engage the ostia of the left main and RCA respectively. JR4 used to cross the AV and obtain LV pressures and gradient across the AV measured via pullback technique. Images of the right and left coronary systems were obtained.  HEMODYNAMICS  LV: 115/0, 3 mmHg AO: 119/69, 89 mmHg No significant gradient across the aortic valve during pullback of JR4 catheter.  LV gram was not performed during the study.  FINDINGS Coronary Angiogram Right dominant circulation Left main: Left main is a large caliber vessel which gives rise to the Left Anterior Descending and the Left circumflex.  Ostium of the left main has 20% stenosis. Left Anterior Descending Artery: LAD is a medium caliber vessel which gives rise to several septal perforators and several diagonal branches.  Mid LAD has 80% tubular stenosis.   JANELL-3 flow Left Circumflex: Left circumflex artery gives rise to obtuse marginals.  Proximal left circumflex has tubular 80% hazy appearing stenosis.  Mid segment of left circumflex at bifurcation with OM has a complex 70% stenosis in both segments.  JANELL-3 flow Right Coronary Artery: The RCA is a medium caliber vessel gives rise to PDA and PLV.  Mid RCA has diffuse 40% stenosis. Percutaneous coronary intervention Diagnostic coronary angiography suggested both left circumflex and LAD to be contributed to ischemia and symptoms.  Left circumflex being the worst of the 2 vessels. 100 units/kg of heparin was administered and ACT of more than 250 was documented. A 6 Faroese XB 4.0 guide catheter was used to engage the ostium of the left main coronary artery. 0.014 run-through wire was then advanced into the left circumflex artery. I then advanced intravascular ultrasound catheter into the proximal segment of left circumflex. IVUS showed proximal left circumflex to be 3 mm and mid left circumflex to be 3.5 mm in dimension.  IVUS also showed calcification with thrombus in the proximal left circumflex. The lesion in the proximal left circumflex was predilated with 2.5 x 12 mm compliant balloon at 12 lisa. This was followed by placement of 3 x 15 mm Xience magi point stent which was then postdilated with 3.25 x 12 mm noncompliant balloon at 14 lisa. I then noted a new 50% stenosis at the ostium of left circumflex artery which was treated with balloon angioplasty by a 3 x 12 mm compliant balloon at 12 lisa. Final angiography showed 20-30% stenosis at the ostium of left circumflex artery.  JANELL-3 flow in the left circumflex artery and OM.  I did not treat mid left circumflex vessel as it was a complex bifurcating lesion which would have required more contrast and time while the patient was hypotensive receiving pressors and IV fluid. I then quickly diverted my attention to the LAD which was wired with 0.014 run-through wire.  Intravascular ultrasound catheter was then advanced into the mid LAD and a slow manual pullback was performed. IVUS showed LAD size to be 3.5 mm.  IVUS also confirmed 80% calcific stenosis in the mid LAD. I then predilated this lesion with 3.25 x 12 mm noncompliant balloon at 12 lisa. This was followed by placement of 3.25 x 33 mm Xience magi point stent. The stent was postdilated with 3.5 x 12 mm noncompliant balloon at 16 lisa. Final angiography showed 0% stenosis in LAD and JANELL-3 flow. All the catheters were exchanged over a wire and subsequently removed.  6 Lithuanian introducer sheath was left in place to be pulled manually at a later time when ACT is less than 150  ESTIMATED BLOOD LOSS: 10 ml  COMPLICATIONS: None  IMPRESSIONS PCI of proximal left circumflex and mid LAD. Low LVEDP  RECOMMENDATIONS -Start dual antiplatelet therapy, high intensity statin and beta-blocker -Staged bifurcation PCI of mid left circumflex artery/OM -Smoking cessation -Obtain an echocardiogram -Cardiac rehab at the time of discharge. Electronically signed by Jeancarlos White MD, 12/25/23, 9:52 PM EST.       Addendum Date: 12/26/2023    OPERATORS Jeancarlos White M.D. (Attending Cardiologist)  PROCEDURES PERFORMED Ultrasound guided Vascular access Left Heart Catheterization Coronary Angiogram PCI of left circumflex artery IVUS of left circumflex artery PCI of mid LAD IVUS of LAD Moderate sedation  INDICATIONS FOR PROCEDURE 68 years old man with multiple cardiovascular risk factors presented to the hospital with chest pain and dynamic ECG changes in the anterolateral leads.  After receiving nitroglycerin he became severely hypotensive into 70s systolic.  After emergent discussion of risk and benefit of the procedure he was brought to the Cath Lab for definitive coronary angiography.  PROCEDURE IN DETAIL Informed consent was obtained from the patient after explaining the risks, benefits, and alternative options of the procedure. After obtaining  informed consent, the patient was brought to the cath lab and was prepped in a sterile fashion. Lidocaine 2% was used for local anesthesia into the right femoral access site. The right femoral artery was accessed with a micropuncture needle via modified Seldinger technique under ultrasound guidance. A 6F was inserted successfully. Afterwards, 6F JR4 and JL5 diagnostic catheters were advanced over a wire into the ascending aorta and were used to engage the ostia of the left main and RCA respectively. JR4 used to cross the AV and obtain LV pressures and gradient across the AV measured via pullback technique. Images of the right and left coronary systems were obtained.  HEMODYNAMICS  LV: 115/0, 3 mmHg AO: 119/69, 89 mmHg No significant gradient across the aortic valve during pullback of JR4 catheter.  LV gram was not performed during the study.  FINDINGS Coronary Angiogram Right dominant circulation Left main: Left main is a large caliber vessel which gives rise to the Left Anterior Descending and the Left circumflex.  Ostium of the left main has 20% stenosis. Left Anterior Descending Artery: LAD is a medium caliber vessel which gives rise to several septal perforators and several diagonal branches.  Mid LAD has 80% tubular stenosis.  JANELL-3 flow Left Circumflex: Left circumflex artery gives rise to obtuse marginals.  Proximal left circumflex has tubular 80% hazy appearing stenosis.  Mid segment of left circumflex at bifurcation with OM has a complex 70% stenosis in both segments.  JANELL-3 flow Right Coronary Artery: The RCA is a medium caliber vessel gives rise to PDA and PLV.  Mid RCA has diffuse 40% stenosis. Percutaneous coronary intervention Diagnostic coronary angiography suggested both left circumflex and LAD to be contributed to ischemia and symptoms.  Left circumflex being the worst of the 2 vessels. 100 units/kg of heparin was administered and ACT of more than 250 was documented. 0.014 run-through wire was  then advanced into the left circumflex artery. I then advanced intravascular ultrasound catheter into the proximal segment of left circumflex. IVUS showed proximal left circumflex to be 3 mm and mid left circumflex to be 3.5 mm in dimension.  IVUS also showed calcification with thrombus in the proximal left circumflex. The lesion in the proximal left circumflex was predilated with 2.5 x 12 mm compliant balloon at 12 lisa. This was followed by placement of 3 x 15 mm Xience magi point stent which was then postdilated with 3.25 x 12 mm noncompliant balloon at 14 lisa. I then noted a new 50% stenosis at the ostium of left circumflex artery which was treated with balloon angioplasty by a 3 x 12 mm compliant balloon at 12 lisa. Final angiography showed 20-30% stenosis at the ostium of left circumflex artery.  JANELL-3 flow in the left circumflex artery and OM.  I did not treat mid left circumflex vessel as it was a complex bifurcating lesion which would have required more contrast and time while the patient was hypotensive receiving pressors and IV fluid. I then quickly diverted my attention to the LAD which was wired with 0.014 run-through wire. Intravascular ultrasound catheter was then advanced into the mid LAD and a slow manual pullback was performed. IVUS showed LAD size to be 3.5 mm.  IVUS also confirmed 80% calcific stenosis in the mid LAD. I then predilated this lesion with 3.25 x 12 mm noncompliant balloon at 12 lisa. This was followed by placement of 3.25 x 33 mm Xience magi point stent. The stent was postdilated with 3.5 x 12 mm noncompliant balloon at 16 lisa. Final angiography showed 0% stenosis in LAD and JANELL-3 flow. All the catheters were exchanged over a wire and subsequently removed.  6 Fijian introducer sheath was left in place to be pulled manually at a later time when ACT is less than 150  ESTIMATED BLOOD LOSS: 10 ml  COMPLICATIONS: None  IMPRESSIONS PCI of proximal left circumflex and mid LAD. Low LVEDP   RECOMMENDATIONS -Start dual antiplatelet therapy, high intensity statin and beta-blocker -Staged bifurcation PCI of mid left circumflex artery/OM -Smoking cessation -Obtain an echocardiogram -Cardiac rehab at the time of discharge. Electronically signed by Jeancarlos White MD, 12/25/23, 9:52 PM EST.       XR Chest 1 View    Result Date: 12/25/2023   Consolidation within right upper lung, concerning for pneumonia.  Recommend follow-up CXR in 4-6 weeks to document resolution.       BLAIR ROBERSON MD       Electronically Signed and Approved By: BLAIR ROBERSON MD on 12/25/2023 at 19:53                       Results for orders placed during the hospital encounter of 12/25/23    Adult Transthoracic Echo Complete W/ Cont if Necessary Per Protocol    Interpretation Summary    Left ventricular ejection fraction appears to be 51 - 55%.    Left ventricular diastolic function is consistent with (grade I) impaired relaxation and age.    Estimated right ventricular systolic pressure from tricuspid regurgitation is moderately elevated (45-55 mmHg).    Mild to moderate tricuspid regurgitation.      Labs Pending at Discharge:  Pending Labs       Order Current Status    Aspergillus Galactomannan Antigen - Blood, Arm, Left In process    Histoplasma Antigen ser In process    Blood Culture - Blood, Arm, Left Preliminary result    Blood Culture - Blood, Hand, Left Preliminary result              Time spent on Discharge including face to face service: 35 minutes    Electronically signed by Steve Kendall MD, 12/29/23, 1:54 PM EST.

## 2023-12-28 NOTE — PROCEDURES
Respiratory Therapist Walking Oximetry Progress Note      Patient Name:  Hermelindo Zuñiga  YOB: 1955  Date of Procedure: 12/28/23              ROOM AIR BASELINE   SpO2% 85   Heart Rate 82     EXERCISE ON ROOM AIR SpO2% EXERCISE ON O2 LPM SpO2%   1 MINUTE  1 MINUTE 1 93   2 MINUTES  2 MINUTES 1 94   3 MINUTES  3 MINUTES 1 92   4 MINUTES  4 MINUTES 1 92   5 MINUTES  5 MINUTES 1 93   6 MINUTES  6 MINUTES 1 91              SpO2% Post Exercise  95   HR Post Exercise  89   Time to Recovery  5 minute     Comments: Patient breathing heavy and said he felt  fatigued during walk test.          Electronically signed by Belia Decker CRT, 12/28/23, 1:03 PM EST.

## 2023-12-29 ENCOUNTER — TELEPHONE (OUTPATIENT)
Dept: CARDIOLOGY | Facility: CLINIC | Age: 68
End: 2023-12-29
Payer: OTHER GOVERNMENT

## 2023-12-29 ENCOUNTER — READMISSION MANAGEMENT (OUTPATIENT)
Dept: CALL CENTER | Facility: HOSPITAL | Age: 68
End: 2023-12-29
Payer: OTHER GOVERNMENT

## 2023-12-29 VITALS
RESPIRATION RATE: 18 BRPM | HEART RATE: 82 BPM | DIASTOLIC BLOOD PRESSURE: 72 MMHG | WEIGHT: 132.72 LBS | OXYGEN SATURATION: 96 % | HEIGHT: 73 IN | BODY MASS INDEX: 17.59 KG/M2 | SYSTOLIC BLOOD PRESSURE: 122 MMHG | TEMPERATURE: 97.3 F

## 2023-12-29 LAB
ANION GAP SERPL CALCULATED.3IONS-SCNC: 10 MMOL/L (ref 5–15)
BUN SERPL-MCNC: 7 MG/DL (ref 8–23)
BUN/CREAT SERPL: 10.8 (ref 7–25)
CALCIUM SPEC-SCNC: 9.2 MG/DL (ref 8.6–10.5)
CHLORIDE SERPL-SCNC: 90 MMOL/L (ref 98–107)
CO2 SERPL-SCNC: 27 MMOL/L (ref 22–29)
CREAT SERPL-MCNC: 0.65 MG/DL (ref 0.76–1.27)
DPYD GENE MUT ANL BLD/T: POSITIVE
EGFRCR SERPLBLD CKD-EPI 2021: 103.9 ML/MIN/1.73
FUNGITELL VALUE: ABNORMAL PG/ML
GLUCOSE SERPL-MCNC: 116 MG/DL (ref 65–99)
IMP & REVIEW OF LAB RESULTS: ABNORMAL
Lab: ABNORMAL
Lab: ABNORMAL
PATHOLOGIST NAME: ABNORMAL
POTASSIUM SERPL-SCNC: 4.1 MMOL/L (ref 3.5–5.2)
QT INTERVAL: 351 MS
QT INTERVAL: 382 MS
QT INTERVAL: 431 MS
QTC INTERVAL: 432 MS
QTC INTERVAL: 445 MS
QTC INTERVAL: 467 MS
REFERENCE VALUE: ABNORMAL
SODIUM SERPL-SCNC: 127 MMOL/L (ref 136–145)

## 2023-12-29 PROCEDURE — 94799 UNLISTED PULMONARY SVC/PX: CPT

## 2023-12-29 PROCEDURE — 25010000002 AMPICILLIN-SULBACTAM PER 1.5 G: Performed by: INTERNAL MEDICINE

## 2023-12-29 PROCEDURE — 80048 BASIC METABOLIC PNL TOTAL CA: CPT | Performed by: INTERNAL MEDICINE

## 2023-12-29 PROCEDURE — 99232 SBSQ HOSP IP/OBS MODERATE 35: CPT | Performed by: INTERNAL MEDICINE

## 2023-12-29 PROCEDURE — 99239 HOSP IP/OBS DSCHRG MGMT >30: CPT | Performed by: INTERNAL MEDICINE

## 2023-12-29 RX ADMIN — LISINOPRIL 10 MG: 10 TABLET ORAL at 10:46

## 2023-12-29 RX ADMIN — TICAGRELOR 90 MG: 90 TABLET ORAL at 10:47

## 2023-12-29 RX ADMIN — GABAPENTIN 600 MG: 300 CAPSULE ORAL at 14:00

## 2023-12-29 RX ADMIN — HYDROCODONE BITARTRATE AND ACETAMINOPHEN 1 TABLET: 5; 325 TABLET ORAL at 10:46

## 2023-12-29 RX ADMIN — ASPIRIN 81 MG: 81 TABLET, COATED ORAL at 10:46

## 2023-12-29 RX ADMIN — HYDROCODONE BITARTRATE AND ACETAMINOPHEN 1 TABLET: 5; 325 TABLET ORAL at 05:29

## 2023-12-29 RX ADMIN — AMPICILLIN AND SULBACTAM 3 G: 2; 1 INJECTION, POWDER, FOR SOLUTION INTRAMUSCULAR; INTRAVENOUS at 03:29

## 2023-12-29 RX ADMIN — AMPICILLIN AND SULBACTAM 3 G: 2; 1 INJECTION, POWDER, FOR SOLUTION INTRAMUSCULAR; INTRAVENOUS at 10:46

## 2023-12-29 RX ADMIN — GABAPENTIN 600 MG: 300 CAPSULE ORAL at 05:29

## 2023-12-29 RX ADMIN — METOPROLOL SUCCINATE 25 MG: 25 TABLET, EXTENDED RELEASE ORAL at 10:46

## 2023-12-29 NOTE — PROGRESS NOTES
Pulmonary / Critical Care Progress Note      Patient Name: Hermelindo Zuñiga  : 1955  MRN: [id number]  Attending:  Steve Kendall, *  Date of admission: 2023    Subjective   Subjective   Follow-up for lung mass    Diuresed well  Down to half liter of oxygen  [facility]   dyspnea and orthopnea better  Less weak and fatigue  No chest pain or hemoptysis  No nausea, fevers or chills      Objective   Objective     Vitals:   Temp:  [96.8 °F (36 °C)-97.5 °F (36.4 °C)] 97.2 °F (36.2 °C)  Heart Rate:  [77-87] 87  Resp:  [16-20] 18  BP: (120-144)/(64-81) 120/70  Flow (L/min):  [0.5-3] 0.5    Physical Exam   Vital Signs Reviewed   General: Frail chronically ill-appearing male, Alert, NAD.    HEENT:  PERRL, EOMI.  OP, nares clear  Chest:  good aeration, barrel chested, decreasing bilaterally, tympanic to percussion bilaterally, no work of breathing noted  CV: RRR, no MGR, pulses 2+, equal.  Abd:  Soft, NT, ND, + BS, no HSM  EXT:  no clubbing, no cyanosis, no edema, muscle wasting x 4 extremities  Neuro:  A&Ox3, CN grossly intact, no focal deficits.  Skin: No rashes or lesions noted      Result Review    Result Review:  I have personally reviewed the results from the time of this admission to 2023 08:09 EST and agree with these findings:  [x]  Laboratory  [x]  Microbiology  [x]  Radiology  [x]  EKG/Telemetry   [x]  Cardiology/Vascular   []  Pathology  []  Old records  []  Other:  Most notable findings include:       Lab 23  0449 23  0615 23  0031 23  1838 23  1147 23  0632 23  0043 23  2156 23  0159 23  1927   WBC  --   --   --   --   --  5.75  --   --  4.53 5.17   HEMOGLOBIN  --   --   --   --   --  13.3  --   --  13.8 13.3   HEMATOCRIT  --   --   --   --   --  39.0  --   --  39.7 38.4   PLATELETS  --   --   --   --   --  331  --   --  311 321   SODIUM 127* 127* 127* 126* 128* 126* 122*   < > 120* 122*    POTASSIUM 4.1 3.7 3.5 3.9 3.9 4.0 3.7   < > 4.2 4.3   CHLORIDE 90* 93* 92* 90* 91* 92* 89*   < > 89* 89*   CO2 27.0 23.2 24.0 25.1 22.7 24.7 20.2*   < > 18.8* 19.7*   BUN 7* 7* 9 9 7* 5* 7*   < > 8 7*   CREATININE 0.65* 0.57* 0.67* 0.66* 0.58* 0.55* 0.46*   < > 0.53* 0.61*   GLUCOSE 116* 87 91 89 80 83 102*   < > 93 100*   CALCIUM 9.2 8.6 8.6 8.9 8.9 9.0 8.6   < > 8.8 9.0   PHOSPHORUS  --   --   --   --   --  3.3  --   --  3.6  --    TOTAL PROTEIN  --   --   --   --   --   --   --   --  7.1 7.2   ALBUMIN  --   --   --   --   --   --   --   --  3.5 3.5   GLOBULIN  --   --   --   --   --   --   --   --  3.6 3.7    < > = values in this interval not displayed.     CT Chest Without Contrast Diagnostic    Result Date: 12/26/2023  PROCEDURE: CT CHEST WO CONTRAST DIAGNOSTIC  COMPARISON: [facility], , XR CHEST 1 VW, 12/25/2023, 19:36.  [facility], , CHEST AP/PA 1 VIEW, 4/15/2013, 7:42.  INDICATIONS: abnorml CXR  TECHNIQUE: CT images were created without the administration of contrast material.   PROTOCOL:   ION Protocol    RADIATION:   DLP: 117 mGy*cm   Automated exposure control was utilized to minimize radiation dose.  FINDINGS:  There are emphysematous changes.  There is a mass in the right upper lobe which has developed since 2013. There are calcifications within this mass.  This extends to the hilar area.  This measures about 7.6 by 7.4 cm.  There is calcification in precarinal, right hilar and subcarinal lymph nodes.  There is coronary artery calcification.  There are bilateral pleural effusions.  There are emphysematous changes with bulla most pronounced right apical area.  Lower slices through the upper abdomen reveal some soft tissue fullness in the right upper quadrant with calcification.       Impression:   1. There is a large mass that has developed in the right upper lobe.  There are calcifications within the mass.  While it is possible this could relate to granulomatous  exposure or possibly sarcoid, a malignancy could not be excluded. 2. Calcified mediastinal and right hilar lymph nodes are suggested. 3. Calcification in what looks like probable soft tissue mass right upper quadrant abdomen 4. Bibasilar effusions 5. Emphysematous changes most marked upper lobes 6. Coronary artery calcification.  FNA of mass would be recommended.    LUKE MILLER MD       Electronically Signed and Approved By: LUKE MILLER MD on 12/26/2023 at 16:53                Assessment & Plan   Assessment / Plan     Active Hospital Problems:  Active Hospital Problems    Diagnosis     **STEMI (ST elevation myocardial infarction)     Severe malnutrition      Impression:  Lung mass highly concerning for primary bronchogenic carcinoma.  Could potentially be invasive fungal infection as well  Euvolemic hyponatremia, clinically insignificant.  Question paraneoplastic acute hypoxemic respiratory failure  Acute cardiogenic pulmonary edema  Small bilateral pleural effusions  Possible community-acquired pneumonia from unspecified organism  COPD without exacerbation  Severe protein calorie malnutrition with muscle wasting    Plan:  CT has right upper lobe mass concerning for malignancy  Will let him recover from his STEMI and plan on doing a lung biopsy 4 to 6 weeks from now as an outpatient.  After discussion with cardiology likely will admit the patient and hold Brilinta for 5 days and bridged with either heparin or cangrelor and then proceed with robotic navigational bronchoscopy.  Otherwise we will have to wait till he is on Brilinta x 3 months to consider holding it and if this is cancer that is too long to wait  We will see the patient in the office and get a PET/CT and plan for biopsy.  I have already contacted my office to coordinate  Trend renal panel and electrolytes.  Replace potassium orally  Does have hyponatremia.  Could be paraneoplastic that does have lung cancer.  Did improve with Lasix  Fungal serologies  pending  No indication to drain pleural effusions at this time.  They are tiny.  Will diurese and monitor at this time  Wean O2 to keep SPO2 greater than 90%.  Will [facility] to [facility]    DVT prophylaxis:  Mechanical DVT prophylaxis orders are present.    CODE STATUS:   Level Of Support Discussed With: Patient  Code Status (Patient has no pulse and is not breathing): CPR (Attempt to Resuscitate)  Medical Interventions (Patient has pulse or is breathing): Full Support      Labs, imaging, microbiology, notes and medications personally reviewed  Discussed with primary    Electronically signed by Carl Castellanos MD, 12/29/23, 4:20 PM EST.

## 2023-12-29 NOTE — TELEPHONE ENCOUNTER
Caller: LAUREL BLUM    Relationship to patient:     Best call back number: [phone] (PATIEN'TS NUMBER)    Chief complaint: STEMI    Type of visit: HOSPITAL FOLLOW UP    Requested date: 2 WEEKS     If rescheduling, when is the original appointment:      Additional notes: DR. ARAIZA CONSULTED IN HOSPITAL. NEXT AVAILABLE FOR [facility] WAS 1.22.24.

## 2023-12-29 NOTE — PLAN OF CARE
Goal Outcome Evaluation:  Plan of Care Reviewed With: patient        Progress: improving  Outcome Evaluation: Patient is discharging home with f/u appts.

## 2023-12-29 NOTE — PLAN OF CARE
Goal Outcome Evaluation:      Patient A&O x4. VS stable, remains on 0.5L O2. Voiced c/o pain x2 on shift. Medicated per MAR with relief. All medications and antibiotics given per MAR. No significant changes or events to report. All needs met at this time. Will continue with current plan of care.

## 2023-12-30 LAB
A FLAVUS AB SER QL ID: NEGATIVE
A FUMIGATUS AB SER QL ID: NEGATIVE
A NIGER AB SER QL ID: NEGATIVE
H CAPSUL AB TITR SER ID: NEGATIVE {TITER}

## 2023-12-30 NOTE — OUTREACH NOTE
Prep Survey      Flowsheet Row Responses   [facility] patient discharged from? [facility]   Is LACE score < 7 ? No   Eligibility Readm Mgmt   Discharge diagnosis STEMI (ST elevation myocardial infarction)   Does the patient have one of the following disease processes/diagnoses(primary or secondary)? Acute MI (STEMI,NSTEMI)   Does the patient have Home health ordered? No   Is there a DME ordered? No   Prep survey completed? Yes            Janet THACKER - Registered Nurse

## 2023-12-31 LAB
BACTERIA SPEC AEROBE CULT: NORMAL

## 2024-01-01 ENCOUNTER — APPOINTMENT (OUTPATIENT)
Dept: GENERAL RADIOLOGY | Facility: HOSPITAL | Age: 69
DRG: 193 | End: 2024-01-01
Payer: OTHER GOVERNMENT

## 2024-01-01 ENCOUNTER — TELEPHONE (OUTPATIENT)
Dept: PULMONOLOGY | Facility: CLINIC | Age: 69
End: 2024-01-01
Payer: OTHER GOVERNMENT

## 2024-01-01 ENCOUNTER — APPOINTMENT (OUTPATIENT)
Dept: CT IMAGING | Facility: HOSPITAL | Age: 69
DRG: 193 | End: 2024-01-01
Payer: OTHER GOVERNMENT

## 2024-01-01 ENCOUNTER — APPOINTMENT (OUTPATIENT)
Dept: GENERAL RADIOLOGY | Facility: HOSPITAL | Age: 69
End: 2024-01-01
Payer: OTHER GOVERNMENT

## 2024-01-01 ENCOUNTER — HOSPITAL ENCOUNTER (EMERGENCY)
Facility: HOSPITAL | Age: 69
Discharge: HOME | End: 2024-01-01
Attending: EMERGENCY MEDICINE | Admitting: EMERGENCY MEDICINE
Payer: OTHER GOVERNMENT

## 2024-01-01 ENCOUNTER — APPOINTMENT (OUTPATIENT)
Facility: HOSPITAL | Age: 69
DRG: 193 | End: 2024-01-01
Payer: OTHER GOVERNMENT

## 2024-01-01 ENCOUNTER — HOSPITAL ENCOUNTER (INPATIENT)
Facility: HOSPITAL | Age: 69
LOS: 5 days | DRG: 193 | End: 2024-02-14
Attending: EMERGENCY MEDICINE | Admitting: INTERNAL MEDICINE
Payer: OTHER GOVERNMENT

## 2024-01-01 ENCOUNTER — DOCUMENTATION (OUTPATIENT)
Dept: CARDIOLOGY | Facility: CLINIC | Age: 69
End: 2024-01-01
Payer: OTHER GOVERNMENT

## 2024-01-01 VITALS
OXYGEN SATURATION: 93 % | WEIGHT: 134.92 LBS | HEIGHT: 72 IN | BODY MASS INDEX: 18.27 KG/M2 | TEMPERATURE: 98 F | DIASTOLIC BLOOD PRESSURE: 82 MMHG | SYSTOLIC BLOOD PRESSURE: 120 MMHG | HEART RATE: 99 BPM | RESPIRATION RATE: 20 BRPM

## 2024-01-01 VITALS
DIASTOLIC BLOOD PRESSURE: 86 MMHG | HEART RATE: 88 BPM | HEIGHT: 73 IN | WEIGHT: 143.74 LBS | BODY MASS INDEX: 19.05 KG/M2 | SYSTOLIC BLOOD PRESSURE: 145 MMHG | TEMPERATURE: 97.7 F | RESPIRATION RATE: 17 BRPM | OXYGEN SATURATION: 92 %

## 2024-01-01 DIAGNOSIS — C34.91 MALIGNANT NEOPLASM OF RIGHT LUNG, UNSPECIFIED PART OF LUNG: Primary | ICD-10-CM

## 2024-01-01 DIAGNOSIS — Z78.9 DECREASED ACTIVITIES OF DAILY LIVING (ADL): ICD-10-CM

## 2024-01-01 DIAGNOSIS — R09.02 HYPOXIA: ICD-10-CM

## 2024-01-01 DIAGNOSIS — Z76.0 MEDICATION REFILL: Primary | ICD-10-CM

## 2024-01-01 DIAGNOSIS — R26.2 DIFFICULTY WALKING: ICD-10-CM

## 2024-01-01 LAB
ALBUMIN FLD-MCNC: 1.7 G/DL
ALBUMIN SERPL-MCNC: 2.9 G/DL (ref 3.5–5.2)
ALBUMIN SERPL-MCNC: 3.1 G/DL (ref 3.5–5.2)
ALBUMIN SERPL-MCNC: 3.7 G/DL (ref 3.5–5.2)
ALBUMIN/GLOB SERPL: 0.9 G/DL
ALBUMIN/GLOB SERPL: 1 G/DL
ALBUMIN/GLOB SERPL: 1 G/DL
ALP SERPL-CCNC: 108 U/L (ref 39–117)
ALP SERPL-CCNC: 114 U/L (ref 39–117)
ALP SERPL-CCNC: 94 U/L (ref 39–117)
ALT SERPL W P-5'-P-CCNC: 10 U/L (ref 1–41)
ALT SERPL W P-5'-P-CCNC: 23 U/L (ref 1–41)
ALT SERPL W P-5'-P-CCNC: 9 U/L (ref 1–41)
AMMONIA BLD-SCNC: 28 UMOL/L (ref 16–60)
ANION GAP SERPL CALCULATED.3IONS-SCNC: 10.5 MMOL/L (ref 5–15)
ANION GAP SERPL CALCULATED.3IONS-SCNC: 11 MMOL/L (ref 5–15)
ANION GAP SERPL CALCULATED.3IONS-SCNC: 11.1 MMOL/L (ref 5–15)
ANION GAP SERPL CALCULATED.3IONS-SCNC: 11.7 MMOL/L (ref 5–15)
ANION GAP SERPL CALCULATED.3IONS-SCNC: 11.9 MMOL/L (ref 5–15)
ANION GAP SERPL CALCULATED.3IONS-SCNC: 12 MMOL/L (ref 5–15)
APPEARANCE FLD: ABNORMAL
APTT PPP: 140.9 SECONDS (ref 78–95.9)
APTT PPP: 29.3 SECONDS (ref 78–95.9)
APTT PPP: 55 SECONDS (ref 78–95.9)
APTT PPP: 56.3 SECONDS (ref 78–95.9)
APTT PPP: 77.8 SECONDS (ref 78–95.9)
ARTERIAL PATENCY WRIST A: ABNORMAL
ARTERIAL PATENCY WRIST A: POSITIVE
AST SERPL-CCNC: 26 U/L (ref 1–40)
AST SERPL-CCNC: 27 U/L (ref 1–40)
AST SERPL-CCNC: 33 U/L (ref 1–40)
BACTERIA FLD CULT: NORMAL
BACTERIA SPEC AEROBE CULT: NORMAL
BACTERIA SPEC ANAEROBE CULT: NORMAL
BASE EXCESS BLDA CALC-SCNC: -0.3 MMOL/L (ref -2–2)
BASE EXCESS BLDA CALC-SCNC: -1.4 MMOL/L (ref -2–2)
BASOPHILS # BLD AUTO: 0.01 10*3/MM3 (ref 0–0.2)
BASOPHILS # BLD AUTO: 0.02 10*3/MM3 (ref 0–0.2)
BASOPHILS # BLD AUTO: 0.04 10*3/MM3 (ref 0–0.2)
BASOPHILS NFR BLD AUTO: 0.2 % (ref 0–1.5)
BASOPHILS NFR BLD AUTO: 0.3 % (ref 0–1.5)
BASOPHILS NFR BLD AUTO: 0.4 % (ref 0–1.5)
BASOPHILS NFR BLD AUTO: 0.4 % (ref 0–1.5)
BASOPHILS NFR BLD AUTO: 0.8 % (ref 0–1.5)
BDY SITE: ABNORMAL
BDY SITE: ABNORMAL
BH CV UPPER VENOUS LEFT AXILLARY AUGMENT: NORMAL
BH CV UPPER VENOUS LEFT AXILLARY COMPRESS: NORMAL
BH CV UPPER VENOUS LEFT AXILLARY PHASIC: NORMAL
BH CV UPPER VENOUS LEFT AXILLARY SPONT: NORMAL
BH CV UPPER VENOUS LEFT BASILIC FOREARM COMPRESS: NORMAL
BH CV UPPER VENOUS LEFT BASILIC UPPER COMPRESS: NORMAL
BH CV UPPER VENOUS LEFT BRACHIAL COMPRESS: NORMAL
BH CV UPPER VENOUS LEFT CEPHALIC FOREARM COMPRESS: NORMAL
BH CV UPPER VENOUS LEFT CEPHALIC UPPER COMPRESS: NORMAL
BH CV UPPER VENOUS LEFT INTERNAL JUGULAR COLOR: 1
BH CV UPPER VENOUS LEFT INTERNAL JUGULAR COMPRESS: NORMAL
BH CV UPPER VENOUS LEFT INTERNAL JUGULAR PHASIC: NORMAL
BH CV UPPER VENOUS LEFT INTERNAL JUGULAR SPONT: NORMAL
BH CV UPPER VENOUS LEFT RADIAL AUGMENT: NORMAL
BH CV UPPER VENOUS LEFT RADIAL COMPRESS: NORMAL
BH CV UPPER VENOUS LEFT RADIAL PHASIC: NORMAL
BH CV UPPER VENOUS LEFT RADIAL SPONT: NORMAL
BH CV UPPER VENOUS LEFT SUBCLAVIAN AUGMENT: NORMAL
BH CV UPPER VENOUS LEFT SUBCLAVIAN COLOR: 1
BH CV UPPER VENOUS LEFT SUBCLAVIAN COMPRESS: NORMAL
BH CV UPPER VENOUS LEFT SUBCLAVIAN PHASIC: NORMAL
BH CV UPPER VENOUS LEFT SUBCLAVIAN SPONT: NORMAL
BH CV UPPER VENOUS LEFT ULNAR AUGMENT: NORMAL
BH CV UPPER VENOUS LEFT ULNAR COMPRESS: NORMAL
BH CV UPPER VENOUS LEFT ULNAR PHASIC: NORMAL
BH CV UPPER VENOUS LEFT ULNAR SPONT: NORMAL
BH CV UPPER VENOUS RIGHT INTERNAL JUGULAR COLOR: 1
BH CV UPPER VENOUS RIGHT INTERNAL JUGULAR COMPRESS: NORMAL
BH CV UPPER VENOUS RIGHT INTERNAL JUGULAR PHASIC: NORMAL
BH CV UPPER VENOUS RIGHT INTERNAL JUGULAR SPONT: NORMAL
BH CV UPPER VENOUS RIGHT SUBCLAVIAN AUGMENT: NORMAL
BH CV UPPER VENOUS RIGHT SUBCLAVIAN COMPRESS: NORMAL
BH CV UPPER VENOUS RIGHT SUBCLAVIAN PHASIC: NORMAL
BH CV UPPER VENOUS RIGHT SUBCLAVIAN SPONT: NORMAL
BILIRUB SERPL-MCNC: 0.5 MG/DL (ref 0–1.2)
BILIRUB SERPL-MCNC: 0.5 MG/DL (ref 0–1.2)
BILIRUB SERPL-MCNC: 0.6 MG/DL (ref 0–1.2)
BILIRUB UR QL STRIP: NEGATIVE
BUN SERPL-MCNC: 11 MG/DL (ref 8–23)
BUN SERPL-MCNC: 5 MG/DL (ref 8–23)
BUN SERPL-MCNC: 7 MG/DL (ref 8–23)
BUN SERPL-MCNC: 9 MG/DL (ref 8–23)
BUN/CREAT SERPL: 12.8 (ref 7–25)
BUN/CREAT SERPL: 13.2 (ref 7–25)
BUN/CREAT SERPL: 16.3 (ref 7–25)
BUN/CREAT SERPL: 20.9 (ref 7–25)
BUN/CREAT SERPL: 27.5 (ref 7–25)
BUN/CREAT SERPL: 8.1 (ref 7–25)
CA-I BLDA-SCNC: 1.03 MMOL/L (ref 1.13–1.32)
CALCIUM SPEC-SCNC: 8.1 MG/DL (ref 8.6–10.5)
CALCIUM SPEC-SCNC: 8.5 MG/DL (ref 8.6–10.5)
CALCIUM SPEC-SCNC: 8.6 MG/DL (ref 8.6–10.5)
CALCIUM SPEC-SCNC: 8.7 MG/DL (ref 8.6–10.5)
CALCIUM SPEC-SCNC: 8.7 MG/DL (ref 8.6–10.5)
CALCIUM SPEC-SCNC: 9.2 MG/DL (ref 8.6–10.5)
CHLORIDE BLDA-SCNC: 99 MMOL/L (ref 98–106)
CHLORIDE SERPL-SCNC: 92 MMOL/L (ref 98–107)
CHLORIDE SERPL-SCNC: 92 MMOL/L (ref 98–107)
CHLORIDE SERPL-SCNC: 94 MMOL/L (ref 98–107)
CHLORIDE SERPL-SCNC: 94 MMOL/L (ref 98–107)
CHLORIDE SERPL-SCNC: 95 MMOL/L (ref 98–107)
CHLORIDE SERPL-SCNC: 95 MMOL/L (ref 98–107)
CHOLESTEROL FLUID: 29 MG/DL
CLARITY UR: CLEAR
CO2 SERPL-SCNC: 21 MMOL/L (ref 22–29)
CO2 SERPL-SCNC: 22 MMOL/L (ref 22–29)
CO2 SERPL-SCNC: 22.1 MMOL/L (ref 22–29)
CO2 SERPL-SCNC: 22.9 MMOL/L (ref 22–29)
CO2 SERPL-SCNC: 23.5 MMOL/L (ref 22–29)
CO2 SERPL-SCNC: 25.3 MMOL/L (ref 22–29)
COHGB MFR BLD: 0.3 % (ref 0–1.5)
COHGB MFR BLD: 1 % (ref 0–1.5)
COLOR FLD: YELLOW
COLOR UR: YELLOW
CREAT SERPL-MCNC: 0.38 MG/DL (ref 0.76–1.27)
CREAT SERPL-MCNC: 0.39 MG/DL (ref 0.76–1.27)
CREAT SERPL-MCNC: 0.4 MG/DL (ref 0.76–1.27)
CREAT SERPL-MCNC: 0.43 MG/DL (ref 0.76–1.27)
CREAT SERPL-MCNC: 0.43 MG/DL (ref 0.76–1.27)
CREAT SERPL-MCNC: 0.62 MG/DL (ref 0.76–1.27)
D DIMER PPP FEU-MCNC: 1.71 MCGFEU/ML (ref 0–0.66)
D-LACTATE SERPL-SCNC: 1.9 MMOL/L (ref 0.5–2)
D-LACTATE SERPL-SCNC: 1.9 MMOL/L (ref 0.5–2)
D-LACTATE SERPL-SCNC: 2.5 MMOL/L (ref 0.5–2)
D-LACTATE SERPL-SCNC: 2.7 MMOL/L (ref 0.5–2)
DEPRECATED RDW RBC AUTO: 45.6 FL (ref 37–54)
DEPRECATED RDW RBC AUTO: 51.5 FL (ref 37–54)
DEPRECATED RDW RBC AUTO: 51.8 FL (ref 37–54)
DEPRECATED RDW RBC AUTO: 52.1 FL (ref 37–54)
DEPRECATED RDW RBC AUTO: 52.6 FL (ref 37–54)
EGFRCR SERPLBLD CKD-EPI 2021: 105.4 ML/MIN/1.73
EGFRCR SERPLBLD CKD-EPI 2021: 117.7 ML/MIN/1.73
EGFRCR SERPLBLD CKD-EPI 2021: 117.7 ML/MIN/1.73
EGFRCR SERPLBLD CKD-EPI 2021: 120.3 ML/MIN/1.73
EGFRCR SERPLBLD CKD-EPI 2021: 121.3 ML/MIN/1.73
EGFRCR SERPLBLD CKD-EPI 2021: 122.2 ML/MIN/1.73
EOSINOPHIL # BLD AUTO: 0.02 10*3/MM3 (ref 0–0.4)
EOSINOPHIL # BLD AUTO: 0.03 10*3/MM3 (ref 0–0.4)
EOSINOPHIL # BLD AUTO: 0.05 10*3/MM3 (ref 0–0.4)
EOSINOPHIL # BLD AUTO: 0.08 10*3/MM3 (ref 0–0.4)
EOSINOPHIL # BLD AUTO: 0.19 10*3/MM3 (ref 0–0.4)
EOSINOPHIL NFR BLD AUTO: 0.3 % (ref 0.3–6.2)
EOSINOPHIL NFR BLD AUTO: 0.6 % (ref 0.3–6.2)
EOSINOPHIL NFR BLD AUTO: 1.1 % (ref 0.3–6.2)
EOSINOPHIL NFR BLD AUTO: 1.4 % (ref 0.3–6.2)
EOSINOPHIL NFR BLD AUTO: 3.8 % (ref 0.3–6.2)
ERYTHROCYTE [DISTWIDTH] IN BLOOD BY AUTOMATED COUNT: 13.4 % (ref 12.3–15.4)
ERYTHROCYTE [DISTWIDTH] IN BLOOD BY AUTOMATED COUNT: 14.9 % (ref 12.3–15.4)
ERYTHROCYTE [DISTWIDTH] IN BLOOD BY AUTOMATED COUNT: 15.1 % (ref 12.3–15.4)
ERYTHROCYTE [DISTWIDTH] IN BLOOD BY AUTOMATED COUNT: 15.1 % (ref 12.3–15.4)
ERYTHROCYTE [DISTWIDTH] IN BLOOD BY AUTOMATED COUNT: 15.2 % (ref 12.3–15.4)
FHHB: 19.2 % (ref 0–5)
FHHB: 6.7 % (ref 0–5)
FLUAV SUBTYP SPEC NAA+PROBE: NOT DETECTED
FLUAV SUBTYP SPEC NAA+PROBE: NOT DETECTED
FLUBV RNA ISLT QL NAA+PROBE: NOT DETECTED
FLUBV RNA ISLT QL NAA+PROBE: NOT DETECTED
GAS FLOW AIRWAY: 5 LPM
GAS FLOW AIRWAY: 55 LPM
GLOBULIN UR ELPH-MCNC: 3.1 GM/DL
GLOBULIN UR ELPH-MCNC: 3.1 GM/DL
GLOBULIN UR ELPH-MCNC: 3.6 GM/DL
GLUCOSE BLDA-MCNC: 85 MG/DL (ref 70–99)
GLUCOSE FLD-MCNC: 99 MG/DL
GLUCOSE SERPL-MCNC: 77 MG/DL (ref 65–99)
GLUCOSE SERPL-MCNC: 85 MG/DL (ref 65–99)
GLUCOSE SERPL-MCNC: 90 MG/DL (ref 65–99)
GLUCOSE SERPL-MCNC: 91 MG/DL (ref 65–99)
GLUCOSE SERPL-MCNC: 91 MG/DL (ref 65–99)
GLUCOSE SERPL-MCNC: 93 MG/DL (ref 65–99)
GLUCOSE UR STRIP-MCNC: NEGATIVE MG/DL
GRAM STN SPEC: NORMAL
HCO3 BLDA-SCNC: 21.7 MMOL/L (ref 22–26)
HCO3 BLDA-SCNC: 23.2 MMOL/L (ref 22–26)
HCT VFR BLD AUTO: 28.8 % (ref 37.5–51)
HCT VFR BLD AUTO: 31.1 % (ref 37.5–51)
HCT VFR BLD AUTO: 31.1 % (ref 37.5–51)
HCT VFR BLD AUTO: 31.9 % (ref 37.5–51)
HCT VFR BLD AUTO: 37.7 % (ref 37.5–51)
HGB BLD-MCNC: 10.5 G/DL (ref 13–17.7)
HGB BLD-MCNC: 12.8 G/DL (ref 13–17.7)
HGB BLD-MCNC: 9.7 G/DL (ref 13–17.7)
HGB BLDA-MCNC: 10.6 G/DL (ref 13.8–16.4)
HGB BLDA-MCNC: 11.1 G/DL (ref 13.8–16.4)
HGB UR QL STRIP.AUTO: NEGATIVE
HOLD SPECIMEN: NORMAL
HOLD SPECIMEN: NORMAL
IMM GRANULOCYTES # BLD AUTO: 0.02 10*3/MM3 (ref 0–0.05)
IMM GRANULOCYTES # BLD AUTO: 0.03 10*3/MM3 (ref 0–0.05)
IMM GRANULOCYTES NFR BLD AUTO: 0.4 % (ref 0–0.5)
IMM GRANULOCYTES NFR BLD AUTO: 0.4 % (ref 0–0.5)
IMM GRANULOCYTES NFR BLD AUTO: 0.5 % (ref 0–0.5)
IMM GRANULOCYTES NFR BLD AUTO: 0.6 % (ref 0–0.5)
IMM GRANULOCYTES NFR BLD AUTO: 0.7 % (ref 0–0.5)
INHALED O2 CONCENTRATION: 40 %
INHALED O2 CONCENTRATION: 90 %
INR PPP: 1.09 (ref 0.86–1.15)
KETONES UR QL STRIP: NEGATIVE
L PNEUMO1 AG UR QL IA: NEGATIVE
LACTATE BLDA-SCNC: 1.86 MMOL/L (ref 0.5–2)
LACTATE BLDA-SCNC: ABNORMAL MMOL/L
LDH FLD-CCNC: 502 U/L
LDH SERPL-CCNC: 853 U/L (ref 135–225)
LEUKOCYTE ESTERASE UR QL STRIP.AUTO: NEGATIVE
LYMPHOCYTES # BLD AUTO: 0.29 10*3/MM3 (ref 0.7–3.1)
LYMPHOCYTES # BLD AUTO: 0.32 10*3/MM3 (ref 0.7–3.1)
LYMPHOCYTES # BLD AUTO: 0.45 10*3/MM3 (ref 0.7–3.1)
LYMPHOCYTES # BLD AUTO: 0.53 10*3/MM3 (ref 0.7–3.1)
LYMPHOCYTES # BLD AUTO: 0.61 10*3/MM3 (ref 0.7–3.1)
LYMPHOCYTES NFR BLD AUTO: 12.2 % (ref 19.6–45.3)
LYMPHOCYTES NFR BLD AUTO: 12.3 % (ref 19.6–45.3)
LYMPHOCYTES NFR BLD AUTO: 4.6 % (ref 19.6–45.3)
LYMPHOCYTES NFR BLD AUTO: 6.2 % (ref 19.6–45.3)
LYMPHOCYTES NFR BLD AUTO: 8.1 % (ref 19.6–45.3)
LYMPHOCYTES NFR FLD MANUAL: 21 %
Lab: NORMAL
M PNEUMO IGM SER QL: NEGATIVE
MACROPHAGE FLUID: 5 %
MAGNESIUM SERPL-MCNC: 1.6 MG/DL (ref 1.6–2.4)
MAGNESIUM SERPL-MCNC: 1.7 MG/DL (ref 1.6–2.4)
MAGNESIUM SERPL-MCNC: 1.8 MG/DL (ref 1.6–2.4)
MCH RBC QN AUTO: 31.2 PG (ref 26.6–33)
MCH RBC QN AUTO: 31.4 PG (ref 26.6–33)
MCH RBC QN AUTO: 31.4 PG (ref 26.6–33)
MCH RBC QN AUTO: 31.7 PG (ref 26.6–33)
MCH RBC QN AUTO: 31.7 PG (ref 26.6–33)
MCHC RBC AUTO-ENTMCNC: 32.9 G/DL (ref 31.5–35.7)
MCHC RBC AUTO-ENTMCNC: 33.7 G/DL (ref 31.5–35.7)
MCHC RBC AUTO-ENTMCNC: 33.8 G/DL (ref 31.5–35.7)
MCHC RBC AUTO-ENTMCNC: 33.8 G/DL (ref 31.5–35.7)
MCHC RBC AUTO-ENTMCNC: 34 G/DL (ref 31.5–35.7)
MCV RBC AUTO: 93.1 FL (ref 79–97)
MCV RBC AUTO: 93.2 FL (ref 79–97)
MCV RBC AUTO: 93.3 FL (ref 79–97)
MCV RBC AUTO: 94 FL (ref 79–97)
MCV RBC AUTO: 94.7 FL (ref 79–97)
MESOTHL CELL NFR FLD MANUAL: 61 %
METHGB BLD QL: 0.2 % (ref 0–1.5)
METHGB BLD QL: 0.3 % (ref 0–1.5)
MODALITY: ABNORMAL
MODALITY: ABNORMAL
MONOCYTES # BLD AUTO: 0.51 10*3/MM3 (ref 0.1–0.9)
MONOCYTES # BLD AUTO: 0.52 10*3/MM3 (ref 0.1–0.9)
MONOCYTES # BLD AUTO: 0.55 10*3/MM3 (ref 0.1–0.9)
MONOCYTES # BLD AUTO: 0.64 10*3/MM3 (ref 0.1–0.9)
MONOCYTES # BLD AUTO: 0.81 10*3/MM3 (ref 0.1–0.9)
MONOCYTES NFR BLD AUTO: 10.9 % (ref 5–12)
MONOCYTES NFR BLD AUTO: 12.6 % (ref 5–12)
MONOCYTES NFR BLD AUTO: 16.4 % (ref 5–12)
MONOCYTES NFR BLD AUTO: 9.1 % (ref 5–12)
MONOCYTES NFR BLD AUTO: 9.3 % (ref 5–12)
MONOCYTES NFR FLD: 11 %
MRSA DNA SPEC QL NAA+PROBE: NORMAL
NEUTROPHILS NFR BLD AUTO: 3.18 10*3/MM3 (ref 1.7–7)
NEUTROPHILS NFR BLD AUTO: 3.28 10*3/MM3 (ref 1.7–7)
NEUTROPHILS NFR BLD AUTO: 3.8 10*3/MM3 (ref 1.7–7)
NEUTROPHILS NFR BLD AUTO: 4.47 10*3/MM3 (ref 1.7–7)
NEUTROPHILS NFR BLD AUTO: 6 10*3/MM3 (ref 1.7–7)
NEUTROPHILS NFR BLD AUTO: 66.3 % (ref 42.7–76)
NEUTROPHILS NFR BLD AUTO: 73.2 % (ref 42.7–76)
NEUTROPHILS NFR BLD AUTO: 80.3 % (ref 42.7–76)
NEUTROPHILS NFR BLD AUTO: 81.3 % (ref 42.7–76)
NEUTROPHILS NFR BLD AUTO: 85.3 % (ref 42.7–76)
NEUTROPHILS NFR FLD MANUAL: 2 %
NITRITE UR QL STRIP: NEGATIVE
NRBC BLD AUTO-RTO: 0 /100 WBC (ref 0–0.2)
NT-PROBNP SERPL-MCNC: 262.7 PG/ML (ref 0–900)
NT-PROBNP SERPL-MCNC: 446.2 PG/ML (ref 0–900)
NUC CELL # FLD: 265 /MM3
OXYHGB MFR BLDV: 79.6 % (ref 94–99)
OXYHGB MFR BLDV: 92.7 % (ref 94–99)
PCO2 BLDA: 31.4 MM HG (ref 35–45)
PCO2 BLDA: 33.9 MM HG (ref 35–45)
PH BLDA: 7.45 PH UNITS (ref 7.35–7.45)
PH BLDA: 7.46 PH UNITS (ref 7.35–7.45)
PH FLD: 8 [PH]
PH UR STRIP.AUTO: 6.5 [PH] (ref 5–8)
PLATELET # BLD AUTO: 349 10*3/MM3 (ref 140–450)
PLATELET # BLD AUTO: 351 10*3/MM3 (ref 140–450)
PLATELET # BLD AUTO: 353 10*3/MM3 (ref 140–450)
PLATELET # BLD AUTO: 366 10*3/MM3 (ref 140–450)
PLATELET # BLD AUTO: 375 10*3/MM3 (ref 140–450)
PMV BLD AUTO: 8 FL (ref 6–12)
PMV BLD AUTO: 8.1 FL (ref 6–12)
PMV BLD AUTO: 8.2 FL (ref 6–12)
PO2 BLD: 114 MM[HG] (ref 0–500)
PO2 BLD: 79 MM[HG] (ref 0–500)
PO2 BLDA: 45.7 MM HG (ref 80–100)
PO2 BLDA: 71.1 MM HG (ref 80–100)
POTASSIUM BLDA-SCNC: 3.22 MMOL/L (ref 3.5–5)
POTASSIUM SERPL-SCNC: 3.3 MMOL/L (ref 3.5–5.2)
POTASSIUM SERPL-SCNC: 3.4 MMOL/L (ref 3.5–5.2)
POTASSIUM SERPL-SCNC: 3.7 MMOL/L (ref 3.5–5.2)
POTASSIUM SERPL-SCNC: 3.8 MMOL/L (ref 3.5–5.2)
POTASSIUM SERPL-SCNC: 4 MMOL/L (ref 3.5–5.2)
POTASSIUM SERPL-SCNC: 4.5 MMOL/L (ref 3.5–5.2)
PROCALCITONIN SERPL-MCNC: 12.17 NG/ML (ref 0–0.25)
PROCALCITONIN SERPL-MCNC: 13.23 NG/ML (ref 0–0.25)
PROT FLD-MCNC: 2.6 G/DL
PROT SERPL-MCNC: 6 G/DL (ref 6–8.5)
PROT SERPL-MCNC: 6.2 G/DL (ref 6–8.5)
PROT SERPL-MCNC: 6.5 G/DL (ref 6–8.5)
PROT SERPL-MCNC: 7.3 G/DL (ref 6–8.5)
PROT UR QL STRIP: NEGATIVE
PROTHROMBIN TIME: 14.3 SECONDS (ref 11.8–14.9)
QTC INTERVAL: NORMAL MS
RBC # BLD AUTO: 3.09 10*6/MM3 (ref 4.14–5.8)
RBC # BLD AUTO: 3.31 10*6/MM3 (ref 4.14–5.8)
RBC # BLD AUTO: 3.34 10*6/MM3 (ref 4.14–5.8)
RBC # BLD AUTO: 3.37 10*6/MM3 (ref 4.14–5.8)
RBC # BLD AUTO: 4.04 10*6/MM3 (ref 4.14–5.8)
RBC # FLD AUTO: 8000 /MM3
RSV RNA NPH QL NAA+NON-PROBE: NOT DETECTED
RSV RNA NPH QL NAA+NON-PROBE: NOT DETECTED
S PNEUM AG SPEC QL LA: NEGATIVE
SAO2 % BLDCOA: 80.6 % (ref 95–99)
SAO2 % BLDCOA: 93.3 % (ref 95–99)
SARS-COV-2 RNA RESP QL NAA+PROBE: NOT DETECTED
SARS-COV-2 RNA RESP QL NAA+PROBE: NOT DETECTED
SODIUM BLDA-SCNC: 128.7 MMOL/L (ref 136–146)
SODIUM SERPL-SCNC: 126 MMOL/L (ref 136–145)
SODIUM SERPL-SCNC: 127 MMOL/L (ref 136–145)
SODIUM SERPL-SCNC: 128 MMOL/L (ref 136–145)
SODIUM SERPL-SCNC: 128 MMOL/L (ref 136–145)
SODIUM SERPL-SCNC: 129 MMOL/L (ref 136–145)
SODIUM SERPL-SCNC: 129 MMOL/L (ref 136–145)
SP GR UR STRIP: 1.01 (ref 1–1.03)
TRIGL FLD-MCNC: 13 MG/DL
TROPONIN T SERPL HS-MCNC: 14 NG/L
TROPONIN T SERPL HS-MCNC: 21 NG/L
UROBILINOGEN UR QL STRIP: NORMAL
VANCOMYCIN SERPL-MCNC: 8.04 MCG/ML (ref 5–40)
WBC NRBC COR # BLD AUTO: 4.35 10*3/MM3 (ref 3.4–10.8)
WBC NRBC COR # BLD AUTO: 4.68 10*3/MM3 (ref 3.4–10.8)
WBC NRBC COR # BLD AUTO: 4.95 10*3/MM3 (ref 3.4–10.8)
WBC NRBC COR # BLD AUTO: 5.57 10*3/MM3 (ref 3.4–10.8)
WBC NRBC COR # BLD AUTO: 7.03 10*3/MM3 (ref 3.4–10.8)
WHOLE BLOOD HOLD COAG: NORMAL
WHOLE BLOOD HOLD SPECIMEN: NORMAL

## 2024-01-01 PROCEDURE — 99233 SBSQ HOSP IP/OBS HIGH 50: CPT | Performed by: INTERNAL MEDICINE

## 2024-01-01 PROCEDURE — 25010000002 MORPHINE PER 10 MG: Performed by: INTERNAL MEDICINE

## 2024-01-01 PROCEDURE — 87899 AGENT NOS ASSAY W/OPTIC: CPT | Performed by: INTERNAL MEDICINE

## 2024-01-01 PROCEDURE — 25010000002 MORPHINE PER 10 MG: Performed by: FAMILY MEDICINE

## 2024-01-01 PROCEDURE — 99233 SBSQ HOSP IP/OBS HIGH 50: CPT | Performed by: FAMILY MEDICINE

## 2024-01-01 PROCEDURE — 99291 CRITICAL CARE FIRST HOUR: CPT

## 2024-01-01 PROCEDURE — 0W993ZZ DRAINAGE OF RIGHT PLEURAL CAVITY, PERCUTANEOUS APPROACH: ICD-10-PCS | Performed by: STUDENT IN AN ORGANIZED HEALTH CARE EDUCATION/TRAINING PROGRAM

## 2024-01-01 PROCEDURE — 80048 BASIC METABOLIC PNL TOTAL CA: CPT | Performed by: INTERNAL MEDICINE

## 2024-01-01 PROCEDURE — 25010000002 ENOXAPARIN PER 10 MG: Performed by: STUDENT IN AN ORGANIZED HEALTH CARE EDUCATION/TRAINING PROGRAM

## 2024-01-01 PROCEDURE — 25010000002 VANCOMYCIN 5 G RECONSTITUTED SOLUTION: Performed by: INTERNAL MEDICINE

## 2024-01-01 PROCEDURE — 25010000002 PIPERACILLIN SOD-TAZOBACTAM PER 1 G: Performed by: INTERNAL MEDICINE

## 2024-01-01 PROCEDURE — 99233 SBSQ HOSP IP/OBS HIGH 50: CPT | Performed by: STUDENT IN AN ORGANIZED HEALTH CARE EDUCATION/TRAINING PROGRAM

## 2024-01-01 PROCEDURE — 94799 UNLISTED PULMONARY SVC/PX: CPT

## 2024-01-01 PROCEDURE — 94761 N-INVAS EAR/PLS OXIMETRY MLT: CPT

## 2024-01-01 PROCEDURE — 25810000003 SODIUM CHLORIDE 0.9 % SOLUTION: Performed by: EMERGENCY MEDICINE

## 2024-01-01 PROCEDURE — 85730 THROMBOPLASTIN TIME PARTIAL: CPT | Performed by: INTERNAL MEDICINE

## 2024-01-01 PROCEDURE — 85025 COMPLETE CBC W/AUTO DIFF WBC: CPT | Performed by: INTERNAL MEDICINE

## 2024-01-01 PROCEDURE — 87102 FUNGUS ISOLATION CULTURE: CPT | Performed by: STUDENT IN AN ORGANIZED HEALTH CARE EDUCATION/TRAINING PROGRAM

## 2024-01-01 PROCEDURE — 94664 DEMO&/EVAL PT USE INHALER: CPT

## 2024-01-01 PROCEDURE — 25010000002 CANGRELOR TETRASODIUM 50 MG RECONSTITUTED SOLUTION 1 EACH VIAL: Performed by: INTERNAL MEDICINE

## 2024-01-01 PROCEDURE — 99284 EMERGENCY DEPT VISIT MOD MDM: CPT

## 2024-01-01 PROCEDURE — 83605 ASSAY OF LACTIC ACID: CPT

## 2024-01-01 PROCEDURE — 93971 EXTREMITY STUDY: CPT

## 2024-01-01 PROCEDURE — 99223 1ST HOSP IP/OBS HIGH 75: CPT | Performed by: INTERNAL MEDICINE

## 2024-01-01 PROCEDURE — 87205 SMEAR GRAM STAIN: CPT | Performed by: STUDENT IN AN ORGANIZED HEALTH CARE EDUCATION/TRAINING PROGRAM

## 2024-01-01 PROCEDURE — 76604 US EXAM CHEST: CPT | Performed by: STUDENT IN AN ORGANIZED HEALTH CARE EDUCATION/TRAINING PROGRAM

## 2024-01-01 PROCEDURE — C9460 INJECTION, CANGRELOR: HCPCS | Performed by: INTERNAL MEDICINE

## 2024-01-01 PROCEDURE — 87641 MR-STAPH DNA AMP PROBE: CPT | Performed by: NURSE PRACTITIONER

## 2024-01-01 PROCEDURE — 93005 ELECTROCARDIOGRAM TRACING: CPT | Performed by: EMERGENCY MEDICINE

## 2024-01-01 PROCEDURE — 97166 OT EVAL MOD COMPLEX 45 MIN: CPT

## 2024-01-01 PROCEDURE — 84478 ASSAY OF TRIGLYCERIDES: CPT | Performed by: STUDENT IN AN ORGANIZED HEALTH CARE EDUCATION/TRAINING PROGRAM

## 2024-01-01 PROCEDURE — 93005 ELECTROCARDIOGRAM TRACING: CPT

## 2024-01-01 PROCEDURE — 81003 URINALYSIS AUTO W/O SCOPE: CPT | Performed by: EMERGENCY MEDICINE

## 2024-01-01 PROCEDURE — 71045 X-RAY EXAM CHEST 1 VIEW: CPT

## 2024-01-01 PROCEDURE — 83050 HGB METHEMOGLOBIN QUAN: CPT | Performed by: INTERNAL MEDICINE

## 2024-01-01 PROCEDURE — 36600 WITHDRAWAL OF ARTERIAL BLOOD: CPT | Performed by: INTERNAL MEDICINE

## 2024-01-01 PROCEDURE — 87637 SARSCOV2&INF A&B&RSV AMP PRB: CPT | Performed by: EMERGENCY MEDICINE

## 2024-01-01 PROCEDURE — 80053 COMPREHEN METABOLIC PANEL: CPT | Performed by: EMERGENCY MEDICINE

## 2024-01-01 PROCEDURE — 94760 N-INVAS EAR/PLS OXIMETRY 1: CPT

## 2024-01-01 PROCEDURE — 87075 CULTR BACTERIA EXCEPT BLOOD: CPT | Performed by: STUDENT IN AN ORGANIZED HEALTH CARE EDUCATION/TRAINING PROGRAM

## 2024-01-01 PROCEDURE — 25810000003 SODIUM CHLORIDE 0.9 % SOLUTION: Performed by: INTERNAL MEDICINE

## 2024-01-01 PROCEDURE — 97161 PT EVAL LOW COMPLEX 20 MIN: CPT

## 2024-01-01 PROCEDURE — 80053 COMPREHEN METABOLIC PANEL: CPT | Performed by: INTERNAL MEDICINE

## 2024-01-01 PROCEDURE — 85025 COMPLETE CBC W/AUTO DIFF WBC: CPT | Performed by: EMERGENCY MEDICINE

## 2024-01-01 PROCEDURE — 83605 ASSAY OF LACTIC ACID: CPT | Performed by: EMERGENCY MEDICINE

## 2024-01-01 PROCEDURE — 99254 IP/OBS CNSLTJ NEW/EST MOD 60: CPT | Performed by: INTERNAL MEDICINE

## 2024-01-01 PROCEDURE — 83615 LACTATE (LD) (LDH) ENZYME: CPT | Performed by: STUDENT IN AN ORGANIZED HEALTH CARE EDUCATION/TRAINING PROGRAM

## 2024-01-01 PROCEDURE — 82375 ASSAY CARBOXYHB QUANT: CPT | Performed by: EMERGENCY MEDICINE

## 2024-01-01 PROCEDURE — 83880 ASSAY OF NATRIURETIC PEPTIDE: CPT | Performed by: EMERGENCY MEDICINE

## 2024-01-01 PROCEDURE — 87070 CULTURE OTHR SPECIMN AEROBIC: CPT | Performed by: STUDENT IN AN ORGANIZED HEALTH CARE EDUCATION/TRAINING PROGRAM

## 2024-01-01 PROCEDURE — 87449 NOS EACH ORGANISM AG IA: CPT | Performed by: INTERNAL MEDICINE

## 2024-01-01 PROCEDURE — 25010000002 HEPARIN (PORCINE) 25000-0.45 UT/250ML-% SOLUTION: Performed by: INTERNAL MEDICINE

## 2024-01-01 PROCEDURE — 25510000001 IOPAMIDOL PER 1 ML: Performed by: INTERNAL MEDICINE

## 2024-01-01 PROCEDURE — 82805 BLOOD GASES W/O2 SATURATION: CPT | Performed by: INTERNAL MEDICINE

## 2024-01-01 PROCEDURE — 84484 ASSAY OF TROPONIN QUANT: CPT | Performed by: EMERGENCY MEDICINE

## 2024-01-01 PROCEDURE — 89051 BODY FLUID CELL COUNT: CPT | Performed by: STUDENT IN AN ORGANIZED HEALTH CARE EDUCATION/TRAINING PROGRAM

## 2024-01-01 PROCEDURE — 84145 PROCALCITONIN (PCT): CPT | Performed by: INTERNAL MEDICINE

## 2024-01-01 PROCEDURE — 82140 ASSAY OF AMMONIA: CPT

## 2024-01-01 PROCEDURE — 83986 ASSAY PH BODY FLUID NOS: CPT | Performed by: STUDENT IN AN ORGANIZED HEALTH CARE EDUCATION/TRAINING PROGRAM

## 2024-01-01 PROCEDURE — 82805 BLOOD GASES W/O2 SATURATION: CPT | Performed by: EMERGENCY MEDICINE

## 2024-01-01 PROCEDURE — 82375 ASSAY CARBOXYHB QUANT: CPT | Performed by: INTERNAL MEDICINE

## 2024-01-01 PROCEDURE — 82042 OTHER SOURCE ALBUMIN QUAN EA: CPT | Performed by: STUDENT IN AN ORGANIZED HEALTH CARE EDUCATION/TRAINING PROGRAM

## 2024-01-01 PROCEDURE — 80202 ASSAY OF VANCOMYCIN: CPT | Performed by: INTERNAL MEDICINE

## 2024-01-01 PROCEDURE — 83735 ASSAY OF MAGNESIUM: CPT | Performed by: INTERNAL MEDICINE

## 2024-01-01 PROCEDURE — 25510000001 IOPAMIDOL PER 1 ML: Performed by: EMERGENCY MEDICINE

## 2024-01-01 PROCEDURE — 82945 GLUCOSE OTHER FLUID: CPT | Performed by: STUDENT IN AN ORGANIZED HEALTH CARE EDUCATION/TRAINING PROGRAM

## 2024-01-01 PROCEDURE — 87040 BLOOD CULTURE FOR BACTERIA: CPT | Performed by: EMERGENCY MEDICINE

## 2024-01-01 PROCEDURE — 25810000003 SODIUM CHLORIDE 0.9 % SOLUTION 250 ML FLEX CONT: Performed by: INTERNAL MEDICINE

## 2024-01-01 PROCEDURE — 86738 MYCOPLASMA ANTIBODY: CPT | Performed by: NURSE PRACTITIONER

## 2024-01-01 PROCEDURE — 99238 HOSP IP/OBS DSCHRG MGMT 30/<: CPT | Performed by: FAMILY MEDICINE

## 2024-01-01 PROCEDURE — 25010000002 DIAZEPAM PER 5 MG: Performed by: FAMILY MEDICINE

## 2024-01-01 PROCEDURE — 85610 PROTHROMBIN TIME: CPT | Performed by: INTERNAL MEDICINE

## 2024-01-01 PROCEDURE — 84311 SPECTROPHOTOMETRY: CPT | Performed by: STUDENT IN AN ORGANIZED HEALTH CARE EDUCATION/TRAINING PROGRAM

## 2024-01-01 PROCEDURE — 71260 CT THORAX DX C+: CPT

## 2024-01-01 PROCEDURE — 36600 WITHDRAWAL OF ARTERIAL BLOOD: CPT | Performed by: EMERGENCY MEDICINE

## 2024-01-01 PROCEDURE — 84157 ASSAY OF PROTEIN OTHER: CPT | Performed by: STUDENT IN AN ORGANIZED HEALTH CARE EDUCATION/TRAINING PROGRAM

## 2024-01-01 PROCEDURE — 94640 AIRWAY INHALATION TREATMENT: CPT

## 2024-01-01 PROCEDURE — 25010000002 ONDANSETRON PER 1 MG: Performed by: FAMILY MEDICINE

## 2024-01-01 PROCEDURE — 93010 ELECTROCARDIOGRAM REPORT: CPT | Performed by: INTERNAL MEDICINE

## 2024-01-01 PROCEDURE — 36415 COLL VENOUS BLD VENIPUNCTURE: CPT | Performed by: STUDENT IN AN ORGANIZED HEALTH CARE EDUCATION/TRAINING PROGRAM

## 2024-01-01 PROCEDURE — 32555 ASPIRATE PLEURA W/ IMAGING: CPT | Performed by: STUDENT IN AN ORGANIZED HEALTH CARE EDUCATION/TRAINING PROGRAM

## 2024-01-01 PROCEDURE — 99223 1ST HOSP IP/OBS HIGH 75: CPT | Performed by: STUDENT IN AN ORGANIZED HEALTH CARE EDUCATION/TRAINING PROGRAM

## 2024-01-01 PROCEDURE — 83050 HGB METHEMOGLOBIN QUAN: CPT | Performed by: EMERGENCY MEDICINE

## 2024-01-01 PROCEDURE — 93971 EXTREMITY STUDY: CPT | Performed by: SURGERY

## 2024-01-01 PROCEDURE — 25010000002 HYALURONIDASE (HUMAN) 150 UNIT/ML SOLUTION 1 ML VIAL: Performed by: INTERNAL MEDICINE

## 2024-01-01 PROCEDURE — 85379 FIBRIN DEGRADATION QUANT: CPT | Performed by: NURSE PRACTITIONER

## 2024-01-01 PROCEDURE — 25010000002 CEFEPIME PER 500 MG: Performed by: EMERGENCY MEDICINE

## 2024-01-01 RX ORDER — IPRATROPIUM BROMIDE AND ALBUTEROL SULFATE 2.5; .5 MG/3ML; MG/3ML
3 SOLUTION RESPIRATORY (INHALATION)
Status: DISCONTINUED | OUTPATIENT
Start: 2024-01-01 | End: 2024-01-01

## 2024-01-01 RX ORDER — ATROPINE SULFATE 10 MG/ML
2 SOLUTION/ DROPS OPHTHALMIC
Status: DISCONTINUED | OUTPATIENT
Start: 2024-01-01 | End: 2024-01-01 | Stop reason: HOSPADM

## 2024-01-01 RX ORDER — SODIUM CHLORIDE 0.9 % (FLUSH) 0.9 %
10 SYRINGE (ML) INJECTION AS NEEDED
Status: DISCONTINUED | OUTPATIENT
Start: 2024-01-01 | End: 2024-01-01 | Stop reason: HOSPADM

## 2024-01-01 RX ORDER — DIAZEPAM 5 MG/ML
5 INJECTION, SOLUTION INTRAMUSCULAR; INTRAVENOUS
Status: DISCONTINUED | OUTPATIENT
Start: 2024-01-01 | End: 2024-01-01 | Stop reason: HOSPADM

## 2024-01-01 RX ORDER — MORPHINE SULFATE 2 MG/ML
2 INJECTION, SOLUTION INTRAMUSCULAR; INTRAVENOUS
Status: DISCONTINUED | OUTPATIENT
Start: 2024-01-01 | End: 2024-01-01 | Stop reason: HOSPADM

## 2024-01-01 RX ORDER — DIAZEPAM 5 MG/ML
10 INJECTION, SOLUTION INTRAMUSCULAR; INTRAVENOUS
Status: DISCONTINUED | OUTPATIENT
Start: 2024-01-01 | End: 2024-01-01 | Stop reason: HOSPADM

## 2024-01-01 RX ORDER — FUROSEMIDE 40 MG/1
40 TABLET ORAL DAILY
Qty: 5 TABLET | Refills: 0 | Status: SHIPPED | OUTPATIENT
Start: 2024-01-01

## 2024-01-01 RX ORDER — NICOTINE 21 MG/24HR
1 PATCH, TRANSDERMAL 24 HOURS TRANSDERMAL DAILY PRN
Status: DISCONTINUED | OUTPATIENT
Start: 2024-01-01 | End: 2024-01-01 | Stop reason: HOSPADM

## 2024-01-01 RX ORDER — HYDROXYZINE HYDROCHLORIDE 25 MG/1
25 TABLET, FILM COATED ORAL ONCE AS NEEDED
Status: COMPLETED | OUTPATIENT
Start: 2024-01-01 | End: 2024-01-01

## 2024-01-01 RX ORDER — BUDESONIDE 0.5 MG/2ML
0.5 INHALANT ORAL
Status: DISCONTINUED | OUTPATIENT
Start: 2024-01-01 | End: 2024-01-01

## 2024-01-01 RX ORDER — MORPHINE SULFATE 20 MG/ML
5 SOLUTION ORAL
Status: DISCONTINUED | OUTPATIENT
Start: 2024-01-01 | End: 2024-01-01 | Stop reason: HOSPADM

## 2024-01-01 RX ORDER — ENOXAPARIN SODIUM 100 MG/ML
40 INJECTION SUBCUTANEOUS DAILY
Status: DISCONTINUED | OUTPATIENT
Start: 2024-01-01 | End: 2024-01-01 | Stop reason: DRUGHIGH

## 2024-01-01 RX ORDER — AMLODIPINE BESYLATE 10 MG/1
10 TABLET ORAL DAILY
Qty: 30 TABLET | Refills: 0 | Status: SHIPPED | OUTPATIENT
Start: 2024-01-01

## 2024-01-01 RX ORDER — DIAZEPAM 5 MG/ML
2.5 INJECTION, SOLUTION INTRAMUSCULAR; INTRAVENOUS
Status: DISCONTINUED | OUTPATIENT
Start: 2024-01-01 | End: 2024-01-01 | Stop reason: HOSPADM

## 2024-01-01 RX ORDER — ONDANSETRON 2 MG/ML
4 INJECTION INTRAMUSCULAR; INTRAVENOUS EVERY 6 HOURS PRN
Status: DISCONTINUED | OUTPATIENT
Start: 2024-01-01 | End: 2024-01-01 | Stop reason: HOSPADM

## 2024-01-01 RX ORDER — HYDROXYZINE HYDROCHLORIDE 25 MG/1
25 TABLET, FILM COATED ORAL 3 TIMES DAILY PRN
COMMUNITY

## 2024-01-01 RX ORDER — ASPIRIN 81 MG/1
81 TABLET ORAL DAILY
Status: DISCONTINUED | OUTPATIENT
Start: 2024-01-01 | End: 2024-01-01

## 2024-01-01 RX ORDER — POTASSIUM CHLORIDE 750 MG/1
40 CAPSULE, EXTENDED RELEASE ORAL EVERY 4 HOURS
Status: COMPLETED | OUTPATIENT
Start: 2024-01-01 | End: 2024-01-01

## 2024-01-01 RX ORDER — LORAZEPAM 2 MG/ML
2 CONCENTRATE ORAL
Status: DISCONTINUED | OUTPATIENT
Start: 2024-01-01 | End: 2024-01-01 | Stop reason: HOSPADM

## 2024-01-01 RX ORDER — HEPARIN SODIUM 10000 [USP'U]/100ML
18 INJECTION, SOLUTION INTRAVENOUS
Status: DISCONTINUED | OUTPATIENT
Start: 2024-01-01 | End: 2024-01-01

## 2024-01-01 RX ORDER — HYDROCODONE BITARTRATE AND ACETAMINOPHEN 5; 325 MG/1; MG/1
1 TABLET ORAL EVERY 4 HOURS PRN
Status: DISPENSED | OUTPATIENT
Start: 2024-01-01 | End: 2024-01-01

## 2024-01-01 RX ORDER — GABAPENTIN 300 MG/1
600 CAPSULE ORAL EVERY 8 HOURS SCHEDULED
Status: DISCONTINUED | OUTPATIENT
Start: 2024-01-01 | End: 2024-01-01 | Stop reason: HOSPADM

## 2024-01-01 RX ORDER — SODIUM CHLORIDE 9 MG/ML
40 INJECTION, SOLUTION INTRAVENOUS AS NEEDED
Status: DISCONTINUED | OUTPATIENT
Start: 2024-01-01 | End: 2024-01-01 | Stop reason: HOSPADM

## 2024-01-01 RX ORDER — ARFORMOTEROL TARTRATE 15 UG/2ML
15 SOLUTION RESPIRATORY (INHALATION)
Status: DISCONTINUED | OUTPATIENT
Start: 2024-01-01 | End: 2024-01-01

## 2024-01-01 RX ORDER — ATORVASTATIN CALCIUM 40 MG/1
40 TABLET, FILM COATED ORAL NIGHTLY
Status: DISCONTINUED | OUTPATIENT
Start: 2024-01-01 | End: 2024-01-01

## 2024-01-01 RX ORDER — ACETAMINOPHEN 325 MG/1
650 TABLET ORAL EVERY 4 HOURS PRN
Status: DISCONTINUED | OUTPATIENT
Start: 2024-01-01 | End: 2024-01-01 | Stop reason: HOSPADM

## 2024-01-01 RX ORDER — HALOPERIDOL 5 MG/ML
1 INJECTION INTRAMUSCULAR
Status: DISCONTINUED | OUTPATIENT
Start: 2024-01-01 | End: 2024-01-01 | Stop reason: HOSPADM

## 2024-01-01 RX ORDER — METOPROLOL SUCCINATE 25 MG/1
25 TABLET, EXTENDED RELEASE ORAL
Qty: 30 TABLET | Refills: 0 | Status: SHIPPED | OUTPATIENT
Start: 2024-01-01

## 2024-01-01 RX ORDER — ENOXAPARIN SODIUM 100 MG/ML
30 INJECTION SUBCUTANEOUS DAILY
Status: DISCONTINUED | OUTPATIENT
Start: 2024-01-01 | End: 2024-01-01

## 2024-01-01 RX ORDER — LISINOPRIL 10 MG/1
10 TABLET ORAL DAILY
Qty: 30 TABLET | Refills: 0 | Status: SHIPPED | OUTPATIENT
Start: 2024-01-01

## 2024-01-01 RX ORDER — SODIUM CHLORIDE 0.9 % (FLUSH) 0.9 %
10 SYRINGE (ML) INJECTION EVERY 12 HOURS SCHEDULED
Status: DISCONTINUED | OUTPATIENT
Start: 2024-01-01 | End: 2024-01-01 | Stop reason: HOSPADM

## 2024-01-01 RX ORDER — PROCHLORPERAZINE EDISYLATE 5 MG/ML
5 INJECTION INTRAMUSCULAR; INTRAVENOUS EVERY 6 HOURS PRN
Status: DISCONTINUED | OUTPATIENT
Start: 2024-01-01 | End: 2024-01-01

## 2024-01-01 RX ORDER — LORAZEPAM 2 MG/ML
1 CONCENTRATE ORAL
Status: DISCONTINUED | OUTPATIENT
Start: 2024-01-01 | End: 2024-01-01 | Stop reason: HOSPADM

## 2024-01-01 RX ORDER — IPRATROPIUM BROMIDE AND ALBUTEROL SULFATE 2.5; .5 MG/3ML; MG/3ML
3 SOLUTION RESPIRATORY (INHALATION) EVERY 4 HOURS PRN
Status: DISCONTINUED | OUTPATIENT
Start: 2024-01-01 | End: 2024-01-01 | Stop reason: HOSPADM

## 2024-01-01 RX ORDER — LORAZEPAM 2 MG/ML
0.5 CONCENTRATE ORAL
Status: DISCONTINUED | OUTPATIENT
Start: 2024-01-01 | End: 2024-01-01 | Stop reason: HOSPADM

## 2024-01-01 RX ORDER — HALOPERIDOL 2 MG/ML
0.5 SOLUTION ORAL
Status: DISCONTINUED | OUTPATIENT
Start: 2024-01-01 | End: 2024-01-01 | Stop reason: HOSPADM

## 2024-01-01 RX ORDER — NALOXONE HCL 0.4 MG/ML
0.4 VIAL (ML) INJECTION
Status: DISCONTINUED | OUTPATIENT
Start: 2024-01-01 | End: 2024-01-01 | Stop reason: HOSPADM

## 2024-01-01 RX ADMIN — GABAPENTIN 600 MG: 300 CAPSULE ORAL at 15:24

## 2024-01-01 RX ADMIN — GABAPENTIN 600 MG: 300 CAPSULE ORAL at 06:32

## 2024-01-01 RX ADMIN — Medication 10 ML: at 09:00

## 2024-01-01 RX ADMIN — HYDROCODONE BITARTRATE AND ACETAMINOPHEN 1 TABLET: 5; 325 TABLET ORAL at 09:13

## 2024-01-01 RX ADMIN — IPRATROPIUM BROMIDE AND ALBUTEROL SULFATE 3 ML: .5; 3 SOLUTION RESPIRATORY (INHALATION) at 15:53

## 2024-01-01 RX ADMIN — ASPIRIN 81 MG: 81 TABLET, COATED ORAL at 09:55

## 2024-01-01 RX ADMIN — Medication 10 ML: at 20:41

## 2024-01-01 RX ADMIN — ASPIRIN 81 MG: 81 TABLET, COATED ORAL at 20:41

## 2024-01-01 RX ADMIN — IPRATROPIUM BROMIDE AND ALBUTEROL SULFATE 3 ML: .5; 3 SOLUTION RESPIRATORY (INHALATION) at 11:45

## 2024-01-01 RX ADMIN — VANCOMYCIN HYDROCHLORIDE 1000 MG: 5 INJECTION, POWDER, LYOPHILIZED, FOR SOLUTION INTRAVENOUS at 20:40

## 2024-01-01 RX ADMIN — IPRATROPIUM BROMIDE AND ALBUTEROL SULFATE 3 ML: .5; 3 SOLUTION RESPIRATORY (INHALATION) at 11:50

## 2024-01-01 RX ADMIN — IPRATROPIUM BROMIDE AND ALBUTEROL SULFATE 3 ML: .5; 3 SOLUTION RESPIRATORY (INHALATION) at 11:47

## 2024-01-01 RX ADMIN — DIAZEPAM 10 MG: 5 INJECTION, SOLUTION INTRAMUSCULAR; INTRAVENOUS at 13:04

## 2024-01-01 RX ADMIN — MORPHINE SULFATE 2 MG: 2 INJECTION, SOLUTION INTRAMUSCULAR; INTRAVENOUS at 16:53

## 2024-01-01 RX ADMIN — GABAPENTIN 600 MG: 300 CAPSULE ORAL at 21:11

## 2024-01-01 RX ADMIN — IPRATROPIUM BROMIDE AND ALBUTEROL SULFATE 3 ML: .5; 3 SOLUTION RESPIRATORY (INHALATION) at 15:45

## 2024-01-01 RX ADMIN — ARFORMOTEROL TARTRATE 15 MCG: 15 SOLUTION RESPIRATORY (INHALATION) at 18:19

## 2024-01-01 RX ADMIN — IPRATROPIUM BROMIDE AND ALBUTEROL SULFATE 3 ML: .5; 3 SOLUTION RESPIRATORY (INHALATION) at 00:54

## 2024-01-01 RX ADMIN — MORPHINE SULFATE 2 MG: 2 INJECTION, SOLUTION INTRAMUSCULAR; INTRAVENOUS at 09:04

## 2024-01-01 RX ADMIN — IPRATROPIUM BROMIDE AND ALBUTEROL SULFATE 3 ML: .5; 3 SOLUTION RESPIRATORY (INHALATION) at 15:17

## 2024-01-01 RX ADMIN — GABAPENTIN 600 MG: 300 CAPSULE ORAL at 23:38

## 2024-01-01 RX ADMIN — IOPAMIDOL 100 ML: 755 INJECTION, SOLUTION INTRAVENOUS at 13:29

## 2024-01-01 RX ADMIN — TICAGRELOR 90 MG: 90 TABLET ORAL at 23:38

## 2024-01-01 RX ADMIN — HYALURONIDASE (HUMAN RECOMBINANT) 150 UNITS: 150 INJECTION, SOLUTION SUBCUTANEOUS at 11:17

## 2024-01-01 RX ADMIN — LORAZEPAM 1 MG: 2 SOLUTION, CONCENTRATE ORAL at 10:19

## 2024-01-01 RX ADMIN — ATORVASTATIN CALCIUM 40 MG: 40 TABLET, FILM COATED ORAL at 21:11

## 2024-01-01 RX ADMIN — BUDESONIDE 0.5 MG: 0.5 SUSPENSION RESPIRATORY (INHALATION) at 18:19

## 2024-01-01 RX ADMIN — PIPERACILLIN AND TAZOBACTAM 3.38 G: 3; .375 INJECTION, POWDER, FOR SOLUTION INTRAVENOUS at 03:53

## 2024-01-01 RX ADMIN — ARFORMOTEROL TARTRATE 15 MCG: 15 SOLUTION RESPIRATORY (INHALATION) at 20:30

## 2024-01-01 RX ADMIN — ASPIRIN 81 MG: 81 TABLET, COATED ORAL at 08:44

## 2024-01-01 RX ADMIN — IPRATROPIUM BROMIDE AND ALBUTEROL SULFATE 3 ML: .5; 3 SOLUTION RESPIRATORY (INHALATION) at 06:53

## 2024-01-01 RX ADMIN — POTASSIUM CHLORIDE 40 MEQ: 10 CAPSULE, COATED, EXTENDED RELEASE ORAL at 15:24

## 2024-01-01 RX ADMIN — IOPAMIDOL 100 ML: 755 INJECTION, SOLUTION INTRAVENOUS at 15:23

## 2024-01-01 RX ADMIN — ARFORMOTEROL TARTRATE 15 MCG: 15 SOLUTION RESPIRATORY (INHALATION) at 06:55

## 2024-01-01 RX ADMIN — VANCOMYCIN HYDROCHLORIDE 1000 MG: 5 INJECTION, POWDER, LYOPHILIZED, FOR SOLUTION INTRAVENOUS at 08:44

## 2024-01-01 RX ADMIN — PIPERACILLIN AND TAZOBACTAM 3.38 G: 3; .375 INJECTION, POWDER, FOR SOLUTION INTRAVENOUS at 23:10

## 2024-01-01 RX ADMIN — MORPHINE SULFATE 4 MG: 4 INJECTION, SOLUTION INTRAMUSCULAR; INTRAVENOUS at 11:21

## 2024-01-01 RX ADMIN — IPRATROPIUM BROMIDE AND ALBUTEROL SULFATE 3 ML: .5; 3 SOLUTION RESPIRATORY (INHALATION) at 20:30

## 2024-01-01 RX ADMIN — HYDROCODONE BITARTRATE AND ACETAMINOPHEN 1 TABLET: 5; 325 TABLET ORAL at 17:23

## 2024-01-01 RX ADMIN — PIPERACILLIN AND TAZOBACTAM 3.38 G: 3; .375 INJECTION, POWDER, FOR SOLUTION INTRAVENOUS at 12:27

## 2024-01-01 RX ADMIN — LORAZEPAM 2 MG: 2 SOLUTION, CONCENTRATE ORAL at 00:15

## 2024-01-01 RX ADMIN — POTASSIUM CHLORIDE 40 MEQ: 10 CAPSULE, COATED, EXTENDED RELEASE ORAL at 12:28

## 2024-01-01 RX ADMIN — BUDESONIDE 0.5 MG: 0.5 SUSPENSION RESPIRATORY (INHALATION) at 06:48

## 2024-01-01 RX ADMIN — PIPERACILLIN AND TAZOBACTAM 3.38 G: 3; .375 INJECTION, POWDER, FOR SOLUTION INTRAVENOUS at 06:32

## 2024-01-01 RX ADMIN — DIAZEPAM 2.5 MG: 5 INJECTION, SOLUTION INTRAMUSCULAR; INTRAVENOUS at 17:50

## 2024-01-01 RX ADMIN — MORPHINE SULFATE 2 MG: 2 INJECTION, SOLUTION INTRAMUSCULAR; INTRAVENOUS at 17:49

## 2024-01-01 RX ADMIN — ATORVASTATIN CALCIUM 40 MG: 40 TABLET, FILM COATED ORAL at 20:41

## 2024-01-01 RX ADMIN — GABAPENTIN 600 MG: 300 CAPSULE ORAL at 14:29

## 2024-01-01 RX ADMIN — BUDESONIDE 0.5 MG: 0.5 SUSPENSION RESPIRATORY (INHALATION) at 06:56

## 2024-01-01 RX ADMIN — HEPARIN SODIUM 18 UNITS/KG/HR: 10000 INJECTION, SOLUTION INTRAVENOUS at 15:03

## 2024-01-01 RX ADMIN — BUDESONIDE 0.5 MG: 0.5 SUSPENSION RESPIRATORY (INHALATION) at 18:50

## 2024-01-01 RX ADMIN — PIPERACILLIN AND TAZOBACTAM 3.38 G: 3; .375 INJECTION, POWDER, FOR SOLUTION INTRAVENOUS at 17:11

## 2024-01-01 RX ADMIN — HYDROXYZINE HYDROCHLORIDE 25 MG: 25 TABLET, FILM COATED ORAL at 05:20

## 2024-01-01 RX ADMIN — ATORVASTATIN CALCIUM 40 MG: 40 TABLET, FILM COATED ORAL at 23:38

## 2024-01-01 RX ADMIN — GABAPENTIN 600 MG: 300 CAPSULE ORAL at 23:17

## 2024-01-01 RX ADMIN — IPRATROPIUM BROMIDE AND ALBUTEROL SULFATE 3 ML: .5; 3 SOLUTION RESPIRATORY (INHALATION) at 18:19

## 2024-01-01 RX ADMIN — HYDROCODONE BITARTRATE AND ACETAMINOPHEN 1 TABLET: 5; 325 TABLET ORAL at 08:45

## 2024-01-01 RX ADMIN — ASPIRIN 81 MG: 81 TABLET, COATED ORAL at 08:59

## 2024-01-01 RX ADMIN — ARFORMOTEROL TARTRATE 15 MCG: 15 SOLUTION RESPIRATORY (INHALATION) at 06:48

## 2024-01-01 RX ADMIN — PIPERACILLIN AND TAZOBACTAM 3.38 G: 3; .375 INJECTION, POWDER, FOR SOLUTION INTRAVENOUS at 17:17

## 2024-01-01 RX ADMIN — ARFORMOTEROL TARTRATE 15 MCG: 15 SOLUTION RESPIRATORY (INHALATION) at 06:53

## 2024-01-01 RX ADMIN — DIAZEPAM 2.5 MG: 5 INJECTION, SOLUTION INTRAMUSCULAR; INTRAVENOUS at 16:50

## 2024-01-01 RX ADMIN — IPRATROPIUM BROMIDE AND ALBUTEROL SULFATE 3 ML: .5; 3 SOLUTION RESPIRATORY (INHALATION) at 07:46

## 2024-01-01 RX ADMIN — BUDESONIDE 0.5 MG: 0.5 SUSPENSION RESPIRATORY (INHALATION) at 07:46

## 2024-01-01 RX ADMIN — ASPIRIN 81 MG: 81 TABLET, COATED ORAL at 08:12

## 2024-01-01 RX ADMIN — IPRATROPIUM BROMIDE AND ALBUTEROL SULFATE 3 ML: .5; 3 SOLUTION RESPIRATORY (INHALATION) at 12:04

## 2024-01-01 RX ADMIN — ARFORMOTEROL TARTRATE 15 MCG: 15 SOLUTION RESPIRATORY (INHALATION) at 07:46

## 2024-01-01 RX ADMIN — GABAPENTIN 600 MG: 300 CAPSULE ORAL at 15:02

## 2024-01-01 RX ADMIN — TICAGRELOR 90 MG: 90 TABLET ORAL at 08:59

## 2024-01-01 RX ADMIN — ARFORMOTEROL TARTRATE 15 MCG: 15 SOLUTION RESPIRATORY (INHALATION) at 18:50

## 2024-01-01 RX ADMIN — Medication 10 ML: at 20:21

## 2024-01-01 RX ADMIN — IPRATROPIUM BROMIDE AND ALBUTEROL SULFATE 3 ML: .5; 3 SOLUTION RESPIRATORY (INHALATION) at 23:30

## 2024-01-01 RX ADMIN — GABAPENTIN 600 MG: 300 CAPSULE ORAL at 05:38

## 2024-01-01 RX ADMIN — IPRATROPIUM BROMIDE AND ALBUTEROL SULFATE 3 ML: .5; 3 SOLUTION RESPIRATORY (INHALATION) at 03:50

## 2024-01-01 RX ADMIN — IPRATROPIUM BROMIDE AND ALBUTEROL SULFATE 3 ML: .5; 3 SOLUTION RESPIRATORY (INHALATION) at 06:55

## 2024-01-01 RX ADMIN — BUDESONIDE 0.5 MG: 0.5 SUSPENSION RESPIRATORY (INHALATION) at 06:53

## 2024-01-01 RX ADMIN — Medication 10 ML: at 21:11

## 2024-01-01 RX ADMIN — PIPERACILLIN AND TAZOBACTAM 3.38 G: 3; .375 INJECTION, POWDER, FOR SOLUTION INTRAVENOUS at 17:54

## 2024-01-01 RX ADMIN — Medication 10 ML: at 08:13

## 2024-01-01 RX ADMIN — IPRATROPIUM BROMIDE AND ALBUTEROL SULFATE 3 ML: .5; 3 SOLUTION RESPIRATORY (INHALATION) at 23:35

## 2024-01-01 RX ADMIN — PIPERACILLIN AND TAZOBACTAM 3.38 G: 3; .375 INJECTION, POWDER, FOR SOLUTION INTRAVENOUS at 10:24

## 2024-01-01 RX ADMIN — MORPHINE SULFATE 4 MG: 4 INJECTION, SOLUTION INTRAMUSCULAR; INTRAVENOUS at 00:55

## 2024-01-01 RX ADMIN — TICAGRELOR 90 MG: 90 TABLET ORAL at 08:44

## 2024-01-01 RX ADMIN — CANGRELOR 0.75 MCG/KG/MIN: 50 INJECTION, POWDER, LYOPHILIZED, FOR SOLUTION INTRAVENOUS at 23:17

## 2024-01-01 RX ADMIN — HYDROCODONE BITARTRATE AND ACETAMINOPHEN 1 TABLET: 5; 325 TABLET ORAL at 23:43

## 2024-01-01 RX ADMIN — IPRATROPIUM BROMIDE AND ALBUTEROL SULFATE 3 ML: .5; 3 SOLUTION RESPIRATORY (INHALATION) at 06:48

## 2024-01-01 RX ADMIN — SODIUM CHLORIDE 1000 ML: 9 INJECTION, SOLUTION INTRAVENOUS at 13:51

## 2024-01-01 RX ADMIN — IPRATROPIUM BROMIDE AND ALBUTEROL SULFATE 3 ML: .5; 3 SOLUTION RESPIRATORY (INHALATION) at 18:50

## 2024-01-01 RX ADMIN — PIPERACILLIN AND TAZOBACTAM 3.38 G: 3; .375 INJECTION, POWDER, FOR SOLUTION INTRAVENOUS at 20:30

## 2024-01-01 RX ADMIN — Medication 10 ML: at 23:18

## 2024-01-01 RX ADMIN — VANCOMYCIN HYDROCHLORIDE 1000 MG: 5 INJECTION, POWDER, LYOPHILIZED, FOR SOLUTION INTRAVENOUS at 23:38

## 2024-01-01 RX ADMIN — LORAZEPAM 2 MG: 2 SOLUTION, CONCENTRATE ORAL at 05:01

## 2024-01-01 RX ADMIN — HYDROCODONE BITARTRATE AND ACETAMINOPHEN 1 TABLET: 5; 325 TABLET ORAL at 21:17

## 2024-01-01 RX ADMIN — HEPARIN SODIUM 18 UNITS/KG/HR: 10000 INJECTION, SOLUTION INTRAVENOUS at 11:42

## 2024-01-01 RX ADMIN — IPRATROPIUM BROMIDE AND ALBUTEROL SULFATE 3 ML: .5; 3 SOLUTION RESPIRATORY (INHALATION) at 15:49

## 2024-01-01 RX ADMIN — CEFEPIME 2000 MG: 2 INJECTION, POWDER, FOR SOLUTION INTRAVENOUS at 15:46

## 2024-01-01 RX ADMIN — PIPERACILLIN AND TAZOBACTAM 3.38 G: 3; .375 INJECTION, POWDER, FOR SOLUTION INTRAVENOUS at 11:07

## 2024-01-01 RX ADMIN — ONDANSETRON 4 MG: 2 INJECTION INTRAMUSCULAR; INTRAVENOUS at 14:27

## 2024-01-01 RX ADMIN — HYDROCODONE BITARTRATE AND ACETAMINOPHEN 1 TABLET: 5; 325 TABLET ORAL at 05:12

## 2024-01-01 RX ADMIN — Medication 10 ML: at 23:38

## 2024-01-01 RX ADMIN — HYDROCODONE BITARTRATE AND ACETAMINOPHEN 1 TABLET: 5; 325 TABLET ORAL at 15:02

## 2024-01-01 RX ADMIN — HYDROCODONE BITARTRATE AND ACETAMINOPHEN 1 TABLET: 5; 325 TABLET ORAL at 18:05

## 2024-01-01 RX ADMIN — GABAPENTIN 600 MG: 300 CAPSULE ORAL at 05:12

## 2024-01-01 RX ADMIN — PIPERACILLIN AND TAZOBACTAM 3.38 G: 3; .375 INJECTION, POWDER, FOR SOLUTION INTRAVENOUS at 02:09

## 2024-01-01 RX ADMIN — IPRATROPIUM BROMIDE AND ALBUTEROL SULFATE 3 ML: .5; 3 SOLUTION RESPIRATORY (INHALATION) at 23:47

## 2024-01-01 RX ADMIN — MORPHINE SULFATE 2 MG: 2 INJECTION, SOLUTION INTRAMUSCULAR; INTRAVENOUS at 02:19

## 2024-01-01 RX ADMIN — BUDESONIDE 0.5 MG: 0.5 SUSPENSION RESPIRATORY (INHALATION) at 20:30

## 2024-01-01 RX ADMIN — GABAPENTIN 600 MG: 300 CAPSULE ORAL at 00:13

## 2024-01-01 RX ADMIN — ENOXAPARIN SODIUM 30 MG: 100 INJECTION SUBCUTANEOUS at 08:59

## 2024-01-01 RX ADMIN — GABAPENTIN 600 MG: 300 CAPSULE ORAL at 22:43

## 2024-01-01 RX ADMIN — Medication 10 ML: at 08:47

## 2024-01-01 RX ADMIN — GABAPENTIN 600 MG: 300 CAPSULE ORAL at 16:57

## 2024-01-01 RX ADMIN — CANGRELOR 0.75 MCG/KG/MIN: 50 INJECTION, POWDER, LYOPHILIZED, FOR SOLUTION INTRAVENOUS at 00:31

## 2024-01-01 RX ADMIN — TICAGRELOR 90 MG: 90 TABLET ORAL at 20:41

## 2024-01-01 RX ADMIN — HYDROCODONE BITARTRATE AND ACETAMINOPHEN 1 TABLET: 5; 325 TABLET ORAL at 08:12

## 2024-01-01 RX ADMIN — VANCOMYCIN HYDROCHLORIDE 1000 MG: 5 INJECTION, POWDER, LYOPHILIZED, FOR SOLUTION INTRAVENOUS at 08:59

## 2024-01-01 NOTE — ED PROVIDER NOTES
Time: 4:14 PM EST  Date of encounter:  1/1/2024  Independent Historian/Clinical History and Information was obtained by:   Patient    History is limited by: N/A    Chief Complaint: Edema      History of Present Illness:  Patient is a 68 y.o. year old male who presents to the emergency department for evaluation of lower extremity edema has gotten worse over the past 2 days.  The patient reports that he was recently discharged but was unable to get any of his medications.  Patient denies cough.  Patient has no fever or chills.  Patient denies nausea, vomiting, and diarrhea.  Patient has no dysuria urinary frequency.    HPI    Patient Care Team  Primary Care Provider: Antony Florence MD    Past Medical History:     No Known Allergies  Past Medical History:   Diagnosis Date    Arthritis     COPD (chronic obstructive pulmonary disease)     Hypertension      Past Surgical History:   Procedure Laterality Date    CARDIAC CATHETERIZATION N/A 12/25/2023    Procedure: Left Heart Cath;  Surgeon: Jeancarlos White MD;  Location: [facility] INVASIVE LOCATION;  Service: Cardiovascular;  Laterality: N/A;    CARDIAC CATHETERIZATION N/A 12/25/2023    Procedure: Percutaneous Coronary Intervention;  Surgeon: Jeancarlos White MD;  Location: [facility] CATH INVASIVE LOCATION;  Service: Cardiovascular;  Laterality: N/A;     No family history on file.    Home Medications:  Prior to Admission medications    Medication Sig Start Date End Date Taking? Authorizing Provider   amLODIPine (NORVASC) 10 MG tablet Take 1 tablet by mouth Daily. 1/1/24  Yes Rosemarie Augustine MD   lisinopril (PRINIVIL,ZESTRIL) 10 MG tablet Take 1 tablet by mouth Daily. Script is written to take one-half tablet by mouth daily for blood pressure but pt said he takes a full tab 1/1/24  Yes Rosemarie Augustine MD   metoprolol succinate XL (TOPROL-XL) 25 MG 24 hr tablet Take 1 tablet by mouth Daily. 1/1/24  Yes Rosemarie Augustine MD   ticagrelor (BRILINTA) 90 MG tablet tablet  Take 1 tablet by mouth 2 (Two) Times a Day. 1/1/24  Yes Rosemarie Augustine MD   acyclovir (ZOVIRAX) 400 MG tablet Take 1 tablet by mouth 5 (Five) Times a Day. Take no more than 5 doses a day. 8/12/23   Dennis Woods MD   albuterol sulfate  (90 Base) MCG/ACT inhaler Inhale 2 puffs Every 4 (Four) Hours As Needed for Wheezing or Shortness of Air.    Pricila Hicsk MD   amoxicillin-clavulanate (AUGMENTIN) 875-125 MG per tablet Take 1 tablet by mouth 2 (Two) Times a Day for 3 days. 12/28/23 12/31/23  Steve Kendall MD   aspirin 81 MG EC tablet Take 1 tablet by mouth Daily. 12/29/23   Steve Kendall MD   atorvastatin (LIPITOR) 40 MG tablet Take 1 tablet by mouth Every Night. 12/28/23   Steve Kendall MD   Cholecalciferol 25 MCG (1000 UT) tablet Take 2 tablets by mouth Daily.    Pricila Hicks MD   furosemide (LASIX) 40 MG tablet Take 1 tablet by mouth Daily. 1/1/24   Rosemarie Augustine MD   gabapentin (NEURONTIN) 600 MG tablet Take 1.5 tablets by mouth 3 (Three) Times a Day.    Pricila Hicks MD   HYDROcodone-acetaminophen (NORCO)  MG per tablet Take 1 tablet by mouth Every 8 (Eight) Hours As Needed for Moderate Pain.    Pricila Hicks MD   nicotine (NICODERM CQ) 14 MG/24HR patch Place 1 patch on the skin as directed by provider Daily As Needed (nicotine withdrawal). 12/28/23   Steve Kendall MD   nitroglycerin (NITROSTAT) 0.4 MG SL tablet Place 1 tablet under the tongue Every 5 (Five) Minutes As Needed for Chest Pain. Take no more than 3 doses in 15 minutes. 12/28/23   Steve Kendall MD   ondansetron ODT (ZOFRAN-ODT) 4 MG disintegrating tablet Place 1 tablet on the tongue Every 8 (Eight) Hours As Needed for Nausea or Vomiting for up to 10 doses. 11/27/23   Sandi Iverson MD   amLODIPine (NORVASC) 10 MG tablet Take 1 tablet by mouth Daily. 12/29/23 1/1/24  Stvee Kendall MD   lisinopril (PRINIVIL,ZESTRIL) 10 MG tablet  "Take 1 tablet by mouth Daily. Script is written to take one-half tablet by mouth daily for blood pressure but pt said he takes a full tab 12/28/23 1/1/24  Steve Kendall MD   metoprolol succinate XL (TOPROL-XL) 25 MG 24 hr tablet Take 1 tablet by mouth Daily. 12/29/23 1/1/24  Steve Kendall MD   ticagrelor (BRILINTA) 90 MG tablet tablet Take 1 tablet by mouth 2 (Two) Times a Day. 12/28/23 1/1/24  Steve Kendall MD        Social History:   Social History     Tobacco Use    Smoking status: Every Day     Packs/day: 0.50     Years: 50.00     Additional pack years: 0.00     Total pack years: 25.00     Types: Cigarettes    Smokeless tobacco: Never   Vaping Use    Vaping Use: Never used   Substance Use Topics    Alcohol use: Never    Drug use: Never         Review of Systems:  Review of Systems   Constitutional:  Negative for chills and fever.   HENT:  Negative for congestion, rhinorrhea and sore throat.    Eyes:  Negative for pain and visual disturbance.   Respiratory:  Negative for apnea, cough, chest tightness and shortness of breath.    Cardiovascular:  Negative for chest pain and palpitations.   Gastrointestinal:  Negative for abdominal pain, diarrhea, nausea and vomiting.   Genitourinary:  Negative for difficulty urinating and dysuria.   Musculoskeletal:  Negative for joint swelling and myalgias.   Skin:  Negative for color change.   Neurological:  Negative for seizures and headaches.   Psychiatric/Behavioral: Negative.     All other systems reviewed and are negative.       Physical Exam:  BP (!) 85/72 (BP Location: Right arm, Patient Position: Lying)   Pulse 89   Temp 97.7 °F (36.5 °C) (Oral)   Resp 20   Ht 185.4 cm (73\")   Wt 65.2 kg (143 lb 11.8 oz)   SpO2 92%   BMI 18.96 kg/m²     Physical Exam  Vitals and nursing note reviewed.   Constitutional:       General: He is not in acute distress.     Appearance: Normal appearance. He is not toxic-appearing.   HENT:      Head: " Normocephalic and atraumatic.      Jaw: There is normal jaw occlusion.   Eyes:      General: Lids are normal.      Extraocular Movements: Extraocular movements intact.      Conjunctiva/sclera: Conjunctivae normal.      Pupils: Pupils are equal, round, and reactive to light.   Cardiovascular:      Rate and Rhythm: Normal rate and regular rhythm.      Pulses: Normal pulses.      Heart sounds: Normal heart sounds.   Pulmonary:      Effort: Pulmonary effort is normal. No respiratory distress.      Breath sounds: Normal breath sounds. No wheezing or rhonchi.   Abdominal:      General: Abdomen is flat.      Palpations: Abdomen is soft.      Tenderness: There is no abdominal tenderness. There is no guarding or rebound.   Musculoskeletal:         General: Normal range of motion.      Cervical back: Normal range of motion and neck supple.      Right lower leg: No edema.      Left lower leg: No edema.   Skin:     General: Skin is warm and dry.   Neurological:      Mental Status: He is alert and oriented to person, place, and time. Mental status is at baseline.   Psychiatric:         Mood and Affect: Mood normal.                  Procedures:  Procedures      Medical Decision Making:      Comorbidities that affect care:    Coronary Artery Disease    External Notes reviewed:    Hospital Discharge Summary: Patient was recently admitted for hypoxemic respiratory failure and coronary artery disease.      The following orders were placed and all results were independently analyzed by me:  Orders Placed This Encounter   Procedures    COVID PRE-OP / PRE-PROCEDURE SCREENING ORDER (NO ISOLATION) - Swab, Nasopharynx    COVID-19, FLU A/B, RSV PCR 1 HR TAT - Swab, Nasopharynx    XR Chest 1 View    Comprehensive Metabolic Panel    Single High Sensitivity Troponin T    CBC Auto Differential    Urinalysis With Microscopic If Indicated (No Culture) - Urine, Clean Catch    BNP    ECG 12 Lead Other; Recent MI, not able to take meds    Insert  Peripheral IV    CBC & Differential       Medications Given in the Emergency Department:  Medications   sodium chloride 0.9 % flush 10 mL (has no administration in time range)        ED Course:    ED Course as of 01/01/24 1618   Mon Jan 01, 2024 1616 EKG:    Rhythm: sinus  Rate: 88  Axis: normal  Intervals: normal  ST Segment: no elevations        Interpreted by me   [BN]      ED Course User Index  [BN] Rosemarie Augustine MD       Labs:    Lab Results (last 24 hours)       Procedure Component Value Units Date/Time    Comprehensive Metabolic Panel [[id number]]  (Abnormal) Collected: 01/01/24 1347    Specimen: Blood Updated: 01/01/24 1412     Glucose 93 mg/dL      BUN 5 mg/dL      Creatinine 0.62 mg/dL      Sodium 129 mmol/L      Potassium 4.5 mmol/L      Chloride 92 mmol/L      CO2 25.3 mmol/L      Calcium 9.2 mg/dL      Total Protein 7.3 g/dL      Albumin 3.7 g/dL      ALT (SGPT) 23 U/L      AST (SGOT) 33 U/L      Alkaline Phosphatase 94 U/L      Total Bilirubin 0.5 mg/dL      Globulin 3.6 gm/dL      A/G Ratio 1.0 g/dL      BUN/Creatinine Ratio 8.1     Anion Gap 11.7 mmol/L      eGFR 105.4 mL/min/1.73     Narrative:      GFR Normal >60  Chronic Kidney Disease <60  Kidney Failure <15      Single High Sensitivity Troponin T [[id number]]  (Normal) Collected: 01/01/24 1347    Specimen: Blood Updated: 01/01/24 1412     HS Troponin T 14 ng/L     Narrative:      High Sensitive Troponin T Reference Range:  <14.0 ng/L- Negative Female for AMI  <22.0 ng/L- Negative Male for AMI  >=14 - Abnormal Female indicating possible myocardial injury.  >=22 - Abnormal Male indicating possible myocardial injury.   Clinicians would have to utilize clinical acumen, EKG, Troponin, and serial changes to determine if it is an Acute Myocardial Infarction or myocardial injury due to an underlying chronic condition.         CBC & Differential [[id number]]  (Abnormal) Collected: 01/01/24 1347    Specimen: Blood Updated: 01/01/24 1353    Narrative:       The following orders were created for panel order CBC & Differential.  Procedure                               Abnormality         Status                     ---------                               -----------         ------                     CBC Auto Differential[[id number]]        Abnormal            Final result                 Please view results for these tests on the individual orders.    CBC Auto Differential [[id number]]  (Abnormal) Collected: 01/01/24 1347    Specimen: Blood Updated: 01/01/24 1353     WBC 4.95 10*3/mm3      RBC 4.04 10*6/mm3      Hemoglobin 12.8 g/dL      Hematocrit 37.7 %      MCV 93.3 fL      MCH 31.7 pg      MCHC 34.0 g/dL      RDW 13.4 %      RDW-SD 45.6 fl      MPV 8.0 fL      Platelets 351 10*3/mm3      Neutrophil % 66.3 %      Lymphocyte % 12.3 %      Monocyte % 16.4 %      Eosinophil % 3.8 %      Basophil % 0.8 %      Immature Grans % 0.4 %      Neutrophils, Absolute 3.28 10*3/mm3      Lymphocytes, Absolute 0.61 10*3/mm3      Monocytes, Absolute 0.81 10*3/mm3      Eosinophils, Absolute 0.19 10*3/mm3      Basophils, Absolute 0.04 10*3/mm3      Immature Grans, Absolute 0.02 10*3/mm3      nRBC 0.0 /100 WBC     BNP [[id number]]  (Normal) Collected: 01/01/24 1347    Specimen: Blood Updated: 01/01/24 1438     proBNP 262.7 pg/mL     Narrative:      This assay is used as an aid in the diagnosis of individuals suspected of having heart failure. It can be used as an aid in the diagnosis of acute decompensated heart failure (ADHF) in patients presenting with signs and symptoms of ADHF to the emergency department (ED). In addition, NT-proBNP of <300 pg/mL indicates ADHF is not likely.    Age Range Result Interpretation  NT-proBNP Concentration (pg/mL:      <50             Positive            >450                   Gray                 300-450                    Negative             <300    50-75           Positive            >900                  Gray                300-900                   Negative            <300      >75             Positive            >1800                  Gray                300-1800                  Negative            <300    COVID PRE-OP / PRE-PROCEDURE SCREENING ORDER (NO ISOLATION) - Swab, Nasopharynx [[id number]]  (Normal) Collected: 01/01/24 1436    Specimen: Swab from Nasopharynx Updated: 01/01/24 1542    Narrative:      The following orders were created for panel order COVID PRE-OP / PRE-PROCEDURE SCREENING ORDER (NO ISOLATION) - Swab, Nasopharynx.  Procedure                               Abnormality         Status                     ---------                               -----------         ------                     COVID-19, FLU A/B, RSV P...[[id number]]  Normal              Final result                 Please view results for these tests on the individual orders.    COVID-19, FLU A/B, RSV PCR 1 HR TAT - Swab, Nasopharynx [[id number]]  (Normal) Collected: 01/01/24 1436    Specimen: Swab from Nasopharynx Updated: 01/01/24 1542     COVID19 Not Detected     Influenza A PCR Not Detected     Influenza B PCR Not Detected     RSV, PCR Not Detected    Narrative:      Fact sheet for providers: https://www.fda.gov/media/437521/download    Fact sheet for patients: https://www.fda.gov/media/616781/download    Test performed by PCR.    Urinalysis With Microscopic If Indicated (No Culture) - Urine, Clean Catch [[id number]]  (Normal) Collected: 01/01/24 1502    Specimen: Urine, Clean Catch Updated: 01/01/24 1509     Color, UA Yellow     Appearance, UA Clear     pH, UA 6.5     Specific Gravity, UA 1.008     Glucose, UA Negative     Ketones, UA Negative     Bilirubin, UA Negative     Blood, UA Negative     Protein, UA Negative     Leuk Esterase, UA Negative     Nitrite, UA Negative     Urobilinogen, UA 1.0 E.U./dL    Narrative:      Urine microscopic not indicated.             Imaging:    XR Chest 1 View    Result Date: 1/1/2024  PROCEDURE: XR CHEST 1 VW  COMPARISON: [facility]  [facility], CR, XR CHEST 1 VW, 12/25/2023, 19:36.  [facility], CT, CT CHEST WO CONTRAST DIAGNOSTIC, 12/26/2023, 15:54.  INDICATIONS: cough  FINDINGS:  The heart is normal in size.  The lungs have a hyperexpanded appearance consistent with emphysema.  Large right upper lobe mass is unchanged.  New small right pleural effusion is seen.  Bony structures appear intact.        Emphysema.  Large right upper lobe mass is unchanged.  New small right effusion.       AJ TRAN MD       Electronically Signed and Approved By: AJ TRAN MD on 1/01/2024 at 15:00                Differential Diagnosis and Discussion:    Edema: Based on the patient's history, signs, and symptoms, the differential diagnosis includes but is not limited to heart failure, kidney disease, liver disease, venous insufficiency, lymphedema, pregnancy, medications, allergic reactions.    All labs were reviewed and interpreted by me.  All X-rays impressions were independently interpreted by me.  EKG was interpreted by me.    MDM     The patient´s CBC that was reviewed and interpreted by me shows no abnormalities of critical concern. Of note, there is no anemia requiring a blood transfusion and the platelet count is acceptable.  The patient´s CMP that was reviewed and interpretted by me shows no abnormalities of critical concern. Of note, the patient´s sodium and potassium are acceptable. The patient´s liver enzymes are unremarkable. The patient´s renal function (creatinine) is preserved. The patient has a normal anion gap.  Troponin is negative.  BNP is 262.  Urinalysis is negative for bacteriuria.  COVID swabs are negative.  Chest x-ray does show a lung mass which is unchanged.  The patient has no respiratory distress, nor does he have hypoxia.  The patient was written for his medications and was advised to follow with his primary care doctor next 2 to 3 days.            Patient Care Considerations:    PERC: I used the PERC score to risk  stratify the patient for PE and a CT of the chest was considered but ultimately not indicated in today's visit.      Consultants/Shared Management Plan:    None    Social Determinants of Health:    Patient is independent, reliable, and has access to care.       Disposition and Care Coordination:    Discharged: I considered escalation of care by admitting this patient for observation, however the patient has improved and is suitable and  stable for discharge.    I have explained the patient´s condition, diagnoses and treatment plan based on the information available to me at this time. I have answered questions and addressed any concerns. The patient has a good  understanding of the patient´s diagnosis, condition, and treatment plan as can be expected at this point. The vital signs have been stable. The patient´s condition is stable and appropriate for discharge from the emergency department.      The patient will pursue further outpatient evaluation with the primary care physician or other designated or consulting physician as outlined in the discharge instructions. They are agreeable to this plan of care and follow-up instructions have been explained in detail. The patient has received these instructions in written format and have expressed an understanding of the discharge instructions. The patient is aware that any significant change in condition or worsening of symptoms should prompt an immediate return to this or the closest emergency department or call to 911.  I have explained discharge medications and the need for follow up with the patient/caretakers. This was also printed in the discharge instructions. Patient was discharged with the following medications and follow up:      Medication List        New Prescriptions      furosemide 40 MG tablet  Commonly known as: LASIX  Take 1 tablet by mouth Daily.            ASK your doctor about these medications      amoxicillin-clavulanate 875-125 MG per  tablet  Commonly known as: AUGMENTIN  Take 1 tablet by mouth 2 (Two) Times a Day for 3 days.  Ask about: Should I take this medication?               Where to Get Your Medications        These medications were sent to [facility]/pharmacy #[id number] - [place], [place] - [address] - [phone] [facility] [phone] FX  [address] [place] [address]      Hours: 24-hours Phone: [phone]   amLODIPine 10 MG tablet  furosemide 40 MG tablet  lisinopril 10 MG tablet  metoprolol succinate XL 25 MG 24 hr tablet  ticagrelor 90 MG tablet tablet      No follow-up provider specified.     Final diagnoses:   None        ED Disposition       None            This medical record created using voice recognition software.             Rosemarie Augustine MD  01/01/24 9200

## 2024-01-02 LAB
QT INTERVAL: 359 MS
QTC INTERVAL: 435 MS

## 2024-01-04 ENCOUNTER — PREP FOR SURGERY (OUTPATIENT)
Dept: OTHER | Facility: HOSPITAL | Age: 69
End: 2024-01-04
Payer: OTHER GOVERNMENT

## 2024-01-04 DIAGNOSIS — R91.8 LUNG MASS: Primary | ICD-10-CM

## 2024-01-04 LAB — GALACTOMANNAN AG SPEC IA-ACNC: 0.06 INDEX (ref 0–0.49)

## 2024-01-05 ENCOUNTER — READMISSION MANAGEMENT (OUTPATIENT)
Dept: CALL CENTER | Facility: HOSPITAL | Age: 69
End: 2024-01-05
Payer: OTHER GOVERNMENT

## 2024-01-05 NOTE — OUTREACH NOTE
"AMI Week 1 Survey      Flowsheet Row Responses   [facility] patient discharged from? [facility]   Does the patient have one of the following disease processes/diagnoses(primary or secondary)? Acute MI (STEMI,NSTEMI)   Week 1 attempt successful? Yes   Call start time 1438   Call end time 1508   Discharge diagnosis STEMI (ST elevation myocardial infarction)   Medication alerts for this patient [facility] added another inhaler, Stiolto 2puffs in the morning.   Meds reviewed with patient/caregiver? Yes   Is the patient having any side effects they believe may be caused by any medication additions or changes? No   Does the patient have all prescriptions related to this admission filled (includes statins,anticoagulants,HTN meds,anti-arrhythmia meds) Yes   Is the patient taking all medications as directed (includes completed medication regime)? Yes   Does the patient have a primary care provider?  Yes   Does the patient have an appointment with their PCP,cardiologist,or clinic within 7 days of discharge? Yes   Has the patient kept scheduled appointments due by today? N/A   Comments Pt c/o weakness,/65 HR 86.He reports his BP has been 130/85.Pt c/o he fell last pm r/t tripping on his slippers, bruised R.eye, laceration on R.eye brow, no LOC/dizziness. +SOA w/ exertion, resolves w/rest & his inhaler. [facility] site- R.groin site is \"tender\". Pt reports that he vomits when eating anything other than chicken noodle soup, since Thanksgiving.   Did the patient receive a copy of their discharge instructions? Yes   Nursing interventions Reviewed instructions with patient   What is the patient's perception of their health status since discharge? Improving   Nursing interventions Nurse provided patient education   Is the patient/caregiver able to teach back signs and symptoms of when to call for help immediately: Sudden chest discomfort, Sudden discomfort in arms, back, neck or jaw, Shortness of breath at any time, Sudden sweating or " [place] skin   Nursing interventions Nurse provided patient education   Is the patient/caregiver able to teach back lifestyle changes to help prevent MIs Quit smoking   Is the patient/caregiver able to teach back ways to prevent a second heart attack: Take medications, Follow up with MD   If the patient is a current smoker, are they able to teach back resources for cessation? Not a smoker   Is the patient/caregiver able to teach back the hierarchy of who to call/visit for symptoms/problems? PCP, Specialist, Home health nurse, Urgent Care, ED, 911 Yes   Week 1 call completed? Yes   Call end time [id number]            Caitlyn VALENTINO - Registered Nurse

## 2024-01-05 NOTE — TELEPHONE ENCOUNTER
Brought to attention of RN patient does not have f/u scheduled after d/c on 12/29/23- STEMI- Patient returned to ER on 1/1/24- inability to get medication filled.     JANEE patient. Patient was able to get medication overnight expressed on 1/2/24- patient has been taking since. Patient has [facility] insurance and will need to have VA referral in place prior to being seen in office.     JANEE White at [facility]- requested Cardiac referral to be sent. Patient appointment must be rescheduled.      patient wife, [facility]. Wife will call back to schedule f/u once she has been told it is ok.     Reminder set to check back with patient. Patient does have medications and is currently taking. Patient encouraged to keep BP/HR log and bring to f/u.

## 2024-01-10 ENCOUNTER — HOSPITAL ENCOUNTER (EMERGENCY)
Facility: HOSPITAL | Age: 69
Discharge: HOME | End: 2024-01-10
Attending: EMERGENCY MEDICINE
Payer: OTHER GOVERNMENT

## 2024-01-10 ENCOUNTER — APPOINTMENT (OUTPATIENT)
Dept: GENERAL RADIOLOGY | Facility: HOSPITAL | Age: 69
End: 2024-01-10
Payer: OTHER GOVERNMENT

## 2024-01-10 ENCOUNTER — APPOINTMENT (OUTPATIENT)
Dept: CT IMAGING | Facility: HOSPITAL | Age: 69
End: 2024-01-10
Payer: OTHER GOVERNMENT

## 2024-01-10 VITALS
HEIGHT: 73 IN | SYSTOLIC BLOOD PRESSURE: 142 MMHG | OXYGEN SATURATION: 93 % | DIASTOLIC BLOOD PRESSURE: 70 MMHG | RESPIRATION RATE: 22 BRPM | TEMPERATURE: 98.2 F | HEART RATE: 75 BPM | WEIGHT: 119.71 LBS | BODY MASS INDEX: 15.87 KG/M2

## 2024-01-10 DIAGNOSIS — R11.2 NAUSEA AND VOMITING, UNSPECIFIED VOMITING TYPE: Primary | ICD-10-CM

## 2024-01-10 DIAGNOSIS — R10.84 GENERALIZED ABDOMINAL PAIN: ICD-10-CM

## 2024-01-10 LAB
ALBUMIN SERPL-MCNC: 3.5 G/DL (ref 3.5–5.2)
ALBUMIN/GLOB SERPL: 0.9 G/DL
ALP SERPL-CCNC: 91 U/L (ref 39–117)
ALT SERPL W P-5'-P-CCNC: 14 U/L (ref 1–41)
ANION GAP SERPL CALCULATED.3IONS-SCNC: 14.6 MMOL/L (ref 5–15)
AST SERPL-CCNC: 34 U/L (ref 1–40)
BASOPHILS # BLD AUTO: 0.02 10*3/MM3 (ref 0–0.2)
BASOPHILS NFR BLD AUTO: 0.3 % (ref 0–1.5)
BILIRUB SERPL-MCNC: 0.4 MG/DL (ref 0–1.2)
BILIRUB UR QL STRIP: NEGATIVE
BUN SERPL-MCNC: 7 MG/DL (ref 8–23)
BUN/CREAT SERPL: 13 (ref 7–25)
CALCIUM SPEC-SCNC: 9.1 MG/DL (ref 8.6–10.5)
CHLORIDE SERPL-SCNC: 85 MMOL/L (ref 98–107)
CLARITY UR: CLEAR
CO2 SERPL-SCNC: 20.4 MMOL/L (ref 22–29)
COLOR UR: YELLOW
CREAT SERPL-MCNC: 0.54 MG/DL (ref 0.76–1.27)
DEPRECATED RDW RBC AUTO: 45.5 FL (ref 37–54)
EGFRCR SERPLBLD CKD-EPI 2021: 109.9 ML/MIN/1.73
EOSINOPHIL # BLD AUTO: 0.08 10*3/MM3 (ref 0–0.4)
EOSINOPHIL NFR BLD AUTO: 1.3 % (ref 0.3–6.2)
ERYTHROCYTE [DISTWIDTH] IN BLOOD BY AUTOMATED COUNT: 13.3 % (ref 12.3–15.4)
GLOBULIN UR ELPH-MCNC: 3.9 GM/DL
GLUCOSE SERPL-MCNC: 87 MG/DL (ref 65–99)
GLUCOSE UR STRIP-MCNC: NEGATIVE MG/DL
HCT VFR BLD AUTO: 34.8 % (ref 37.5–51)
HGB BLD-MCNC: 12 G/DL (ref 13–17.7)
HGB UR QL STRIP.AUTO: NEGATIVE
HOLD SPECIMEN: NORMAL
HOLD SPECIMEN: NORMAL
IMM GRANULOCYTES # BLD AUTO: 0.03 10*3/MM3 (ref 0–0.05)
IMM GRANULOCYTES NFR BLD AUTO: 0.5 % (ref 0–0.5)
KETONES UR QL STRIP: NEGATIVE
LEUKOCYTE ESTERASE UR QL STRIP.AUTO: NEGATIVE
LIPASE SERPL-CCNC: 22 U/L (ref 13–60)
LYMPHOCYTES # BLD AUTO: 0.52 10*3/MM3 (ref 0.7–3.1)
LYMPHOCYTES NFR BLD AUTO: 8.7 % (ref 19.6–45.3)
MCH RBC QN AUTO: 31.7 PG (ref 26.6–33)
MCHC RBC AUTO-ENTMCNC: 34.5 G/DL (ref 31.5–35.7)
MCV RBC AUTO: 92.1 FL (ref 79–97)
MONOCYTES # BLD AUTO: 0.65 10*3/MM3 (ref 0.1–0.9)
MONOCYTES NFR BLD AUTO: 10.9 % (ref 5–12)
NEUTROPHILS NFR BLD AUTO: 4.66 10*3/MM3 (ref 1.7–7)
NEUTROPHILS NFR BLD AUTO: 78.3 % (ref 42.7–76)
NITRITE UR QL STRIP: NEGATIVE
NRBC BLD AUTO-RTO: 0 /100 WBC (ref 0–0.2)
NT-PROBNP SERPL-MCNC: 302.7 PG/ML (ref 0–900)
PH UR STRIP.AUTO: 6 [PH] (ref 5–8)
PLATELET # BLD AUTO: 377 10*3/MM3 (ref 140–450)
PMV BLD AUTO: 8.1 FL (ref 6–12)
POTASSIUM SERPL-SCNC: 4 MMOL/L (ref 3.5–5.2)
PROT SERPL-MCNC: 7.4 G/DL (ref 6–8.5)
PROT UR QL STRIP: NEGATIVE
QT INTERVAL: 401 MS
QTC INTERVAL: 454 MS
RBC # BLD AUTO: 3.78 10*6/MM3 (ref 4.14–5.8)
SODIUM SERPL-SCNC: 120 MMOL/L (ref 136–145)
SP GR UR STRIP: 1.02 (ref 1–1.03)
TROPONIN T SERPL HS-MCNC: 14 NG/L
UROBILINOGEN UR QL STRIP: NORMAL
WBC NRBC COR # BLD AUTO: 5.96 10*3/MM3 (ref 3.4–10.8)
WHOLE BLOOD HOLD COAG: NORMAL
WHOLE BLOOD HOLD SPECIMEN: NORMAL

## 2024-01-10 PROCEDURE — 83880 ASSAY OF NATRIURETIC PEPTIDE: CPT | Performed by: EMERGENCY MEDICINE

## 2024-01-10 PROCEDURE — 96375 TX/PRO/DX INJ NEW DRUG ADDON: CPT

## 2024-01-10 PROCEDURE — 81003 URINALYSIS AUTO W/O SCOPE: CPT | Performed by: EMERGENCY MEDICINE

## 2024-01-10 PROCEDURE — 71045 X-RAY EXAM CHEST 1 VIEW: CPT

## 2024-01-10 PROCEDURE — 80053 COMPREHEN METABOLIC PANEL: CPT | Performed by: EMERGENCY MEDICINE

## 2024-01-10 PROCEDURE — 74174 CTA ABD&PLVS W/CONTRAST: CPT

## 2024-01-10 PROCEDURE — 96374 THER/PROPH/DIAG INJ IV PUSH: CPT

## 2024-01-10 PROCEDURE — 93005 ELECTROCARDIOGRAM TRACING: CPT

## 2024-01-10 PROCEDURE — 84484 ASSAY OF TROPONIN QUANT: CPT | Performed by: EMERGENCY MEDICINE

## 2024-01-10 PROCEDURE — 83690 ASSAY OF LIPASE: CPT | Performed by: EMERGENCY MEDICINE

## 2024-01-10 PROCEDURE — 25810000003 SODIUM CHLORIDE 0.9 % SOLUTION: Performed by: EMERGENCY MEDICINE

## 2024-01-10 PROCEDURE — 93005 ELECTROCARDIOGRAM TRACING: CPT | Performed by: EMERGENCY MEDICINE

## 2024-01-10 PROCEDURE — 85025 COMPLETE CBC W/AUTO DIFF WBC: CPT | Performed by: EMERGENCY MEDICINE

## 2024-01-10 PROCEDURE — 25010000002 MORPHINE PER 10 MG: Performed by: EMERGENCY MEDICINE

## 2024-01-10 PROCEDURE — 25510000001 IOPAMIDOL PER 1 ML: Performed by: EMERGENCY MEDICINE

## 2024-01-10 PROCEDURE — 25010000002 ONDANSETRON PER 1 MG: Performed by: EMERGENCY MEDICINE

## 2024-01-10 PROCEDURE — 99285 EMERGENCY DEPT VISIT HI MDM: CPT

## 2024-01-10 RX ORDER — ONDANSETRON 4 MG/1
4 TABLET, ORALLY DISINTEGRATING ORAL EVERY 6 HOURS PRN
Qty: 12 TABLET | Refills: 0 | Status: SHIPPED | OUTPATIENT
Start: 2024-01-10

## 2024-01-10 RX ORDER — SODIUM CHLORIDE 0.9 % (FLUSH) 0.9 %
10 SYRINGE (ML) INJECTION AS NEEDED
Status: DISCONTINUED | OUTPATIENT
Start: 2024-01-10 | End: 2024-01-10 | Stop reason: HOSPADM

## 2024-01-10 RX ORDER — ONDANSETRON 2 MG/ML
4 INJECTION INTRAMUSCULAR; INTRAVENOUS ONCE
Status: COMPLETED | OUTPATIENT
Start: 2024-01-10 | End: 2024-01-10

## 2024-01-10 RX ADMIN — ONDANSETRON 4 MG: 2 INJECTION INTRAMUSCULAR; INTRAVENOUS at 13:10

## 2024-01-10 RX ADMIN — SODIUM CHLORIDE 1000 ML: 9 INJECTION, SOLUTION INTRAVENOUS at 13:10

## 2024-01-10 RX ADMIN — IOPAMIDOL 100 ML: 755 INJECTION, SOLUTION INTRAVENOUS at 13:12

## 2024-01-10 RX ADMIN — MORPHINE SULFATE 4 MG: 4 INJECTION, SOLUTION INTRAMUSCULAR; INTRAVENOUS at 13:10

## 2024-01-10 RX ADMIN — SODIUM CHLORIDE 1000 ML: 9 INJECTION, SOLUTION INTRAVENOUS at 17:02

## 2024-01-10 NOTE — ED PROVIDER NOTES
Time: 12:40 PM EST  Date of encounter:  1/10/2024  Independent Historian/Clinical History and Information was obtained by:   Patient    History is limited by: N/A    Chief Complaint: Abdominal pain, nausea, vomiting, back pain, short of breath      History of Present Illness:  Patient is a 68 y.o. year old male who presents to the emergency department for evaluation of abdominal pain with nausea, vomiting and shortness of breath.  Patient reports since Thanksgiving he has had difficulty keeping any food down.  He reports a 30 to 40 pound weight loss.  He had a cardiac stent placed around Cristy time.  He had a follow-up appointment with his PCP today and was sent to the emergency department due to the patient complaining of abdominal and low back pain with nausea and vomiting.  Patient appears very cachectic.  He also states he has a spot on his lungs that pulmonary wants to biopsy but this has been delayed due to him being on Brilinta.    HPI    Patient Care Team  Primary Care Provider: Antony Florecne MD    Past Medical History:     No Known Allergies  Past Medical History:   Diagnosis Date    Arthritis     COPD (chronic obstructive pulmonary disease)     Hypertension      Past Surgical History:   Procedure Laterality Date    CARDIAC CATHETERIZATION N/A 12/25/2023    Procedure: Left Heart Cath;  Surgeon: Jeancarlos White MD;  Location: [facility] CATH INVASIVE LOCATION;  Service: Cardiovascular;  Laterality: N/A;    CARDIAC CATHETERIZATION N/A 12/25/2023    Procedure: Percutaneous Coronary Intervention;  Surgeon: Jeancarlos White MD;  Location: [facility] INVASIVE LOCATION;  Service: Cardiovascular;  Laterality: N/A;     History reviewed. No pertinent family history.    Home Medications:  Prior to Admission medications    Medication Sig Start Date End Date Taking? Authorizing Provider   acyclovir (ZOVIRAX) 400 MG tablet Take 1 tablet by mouth 5 (Five) Times a Day. Take no more than 5 doses a day. 8/12/23   Chuck  MD Dennis   albuterol sulfate  (90 Base) MCG/ACT inhaler Inhale 2 puffs Every 4 (Four) Hours As Needed for Wheezing or Shortness of Air.    ProviderPricila MD   amLODIPine (NORVASC) 10 MG tablet Take 1 tablet by mouth Daily. 1/1/24   Rosemarie Augustine MD   aspirin 81 MG EC tablet Take 1 tablet by mouth Daily. 12/29/23   Steve Kendall MD   atorvastatin (LIPITOR) 40 MG tablet Take 1 tablet by mouth Every Night. 12/28/23   Steve Kendall MD   Cholecalciferol 25 MCG (1000 UT) tablet Take 2 tablets by mouth Daily.    ProviderPricila MD   furosemide (LASIX) 40 MG tablet Take 1 tablet by mouth Daily. 1/1/24   Rosemarie Augustine MD   gabapentin (NEURONTIN) 600 MG tablet Take 1.5 tablets by mouth 3 (Three) Times a Day.    ProviderPricila MD   HYDROcodone-acetaminophen (NORCO)  MG per tablet Take 1 tablet by mouth Every 8 (Eight) Hours As Needed for Moderate Pain.    ProviderPricila MD   lisinopril (PRINIVIL,ZESTRIL) 10 MG tablet Take 1 tablet by mouth Daily. Script is written to take one-half tablet by mouth daily for blood pressure but pt said he takes a full tab 1/1/24   Rosemarie Augustine MD   metoprolol succinate XL (TOPROL-XL) 25 MG 24 hr tablet Take 1 tablet by mouth Daily. 1/1/24   Rosemarie Augustine MD   nicotine (NICODERM CQ) 14 MG/24HR patch Place 1 patch on the skin as directed by provider Daily As Needed (nicotine withdrawal). 12/28/23   Steve Kendall MD   nitroglycerin (NITROSTAT) 0.4 MG SL tablet Place 1 tablet under the tongue Every 5 (Five) Minutes As Needed for Chest Pain. Take no more than 3 doses in 15 minutes. 12/28/23   Steve Kendall MD   ondansetron ODT (ZOFRAN-ODT) 4 MG disintegrating tablet Place 1 tablet on the tongue Every 8 (Eight) Hours As Needed for Nausea or Vomiting for up to 10 doses. 11/27/23   Sandi Iverson MD   ticagrelor (BRILINTA) 90 MG tablet tablet Take 1 tablet by mouth 2 (Two) Times a Day. 1/1/24    "Rosemarie Augustine MD        Social History:   Social History     Tobacco Use    Smoking status: Every Day     Packs/day: 0.50     Years: 50.00     Additional pack years: 0.00     Total pack years: 25.00     Types: Cigarettes    Smokeless tobacco: Never   Vaping Use    Vaping Use: Never used   Substance Use Topics    Alcohol use: Never    Drug use: Never         Review of Systems:  Review of Systems   Constitutional:  Positive for appetite change and unexpected weight change.   Respiratory:  Positive for shortness of breath.    Gastrointestinal:  Positive for abdominal pain.   Musculoskeletal:  Positive for back pain.        Physical Exam:  BP (!) 88/54 (BP Location: Left arm, Patient Position: Sitting)   Pulse 75   Temp 98.2 °F (36.8 °C) (Oral)   Resp 22   Ht 185.4 cm (73\")   Wt 54.3 kg (119 lb 11.4 oz)   SpO2 98%   BMI 15.79 kg/m²     Physical Exam  Vitals and nursing note reviewed.   Constitutional:       Comments: Cachectic   Cardiovascular:      Rate and Rhythm: Normal rate and regular rhythm.      Heart sounds: Normal heart sounds.   Pulmonary:      Effort: Pulmonary effort is normal.      Comments: Breath sounds clear but diminished bilaterally  Abdominal:      Palpations: Abdomen is soft.      Tenderness: There is abdominal tenderness.      Comments: Mild generalized tenderness   Musculoskeletal:         General: Normal range of motion.      Cervical back: Normal range of motion and neck supple.   Skin:     General: Skin is warm and dry.   Neurological:      Mental Status: He is oriented to person, place, and time.   Psychiatric:         Mood and Affect: Mood normal.                  Procedures:  Procedures      Medical Decision Making:      Comorbidities that affect care:    {Comorbidities that affect care:33683}    External Notes reviewed:  Pulmonary note by Dr. Castellanos done on 12/29/2023 as a consult for the patient's admission for STEMI and severe malnutrition.  Impression is a lung mass that is " highly concerning for primary bronchogenic carcinoma based on the lung CT showing a right upper lung mass with a plan to do a lung biopsy in 4 to 6 weeks as an outpatient.    Cardiology note by Dr. Barrera status post cardiac catheterization on 12/27/2023 where he placed stents in the left circumflex and LAD.    The following orders were placed and all results were independently analyzed by me:  Orders Placed This Encounter   Procedures    CT Angiogram Abdomen Pelvis    XR Chest 1 View    [place] Draw    Comprehensive Metabolic Panel    Lipase    Single High Sensitivity Troponin T    Urinalysis With Microscopic If Indicated (No Culture) - Urine, Clean Catch    CBC Auto Differential    BNP    NPO Diet NPO Type: Strict NPO    Undress & Gown    Continuous Pulse Oximetry    Vital Signs    Oxygen Therapy- Nasal Cannula; Titrate 1-6 LPM Per SpO2; 90 - 95%    ECG 12 Lead ED Triage Standing Order; Abdominal Pain (Upper)    Insert Peripheral IV    CBC & Differential    Green Top (Gel)    Lavender Top    Gold Top - SST    Light Blue Top       Medications Given in the Emergency Department:  Medications   sodium chloride 0.9 % flush 10 mL (has no administration in time range)   sodium chloride 0.9 % bolus 1,000 mL (has no administration in time range)   ondansetron (ZOFRAN) injection 4 mg (has no administration in time range)   morphine injection 4 mg (has no administration in time range)        ED Course:         Labs:    Lab Results (last 24 hours)       ** No results found for the last 24 hours. **             Imaging:    No Radiology Exams Resulted Within Past 24 Hours      Differential Diagnosis and Discussion:    Abdominal Pain: Based on the patient's signs and symptoms, I considered abdominal aortic aneurysm, small bowel obstruction, pancreatitis, acute cholecystitis, acute appendecitis, peptic ulcer disease, gastritis, colitis, endocrine disorders, irritable bowel syndrome and other differential diagnosis an etiology of  the patient's abdominal pain.  Dyspnea: Differential diagnosis includes but is not limited to metabolic acidosis, neurological disorders, psychogenic, asthma, pneumothorax, upper airway obstruction, COPD, pneumonia, noncardiogenic pulmonary edema, interstitial lung disease, anemia, congestive heart failure, and pulmonary embolism    {Independent Review of (Optional):34095}    [facility]       {Critical Care:06336}    {SEPSIS RECOGNITION:56928}    Patient Care Considerations:    {Considerations (Optional):16199}      Consultants/Shared Management Plan:    {Shared Management Plan (Optional):52484}    Social Determinants of Health:    Patient is independent, reliable, and has access to care.       Disposition and Care Coordination:    {Admission consideration:50672}    {Discharge (Optional):09927}    Final diagnoses:   None        ED Disposition       None            This medical record created using voice recognition software.           the office in the next week.    Social Determinants of Health:    Patient is independent, reliable, and has access to care.       Disposition and Care Coordination:    Discharged: The patient is suitable and stable for discharge with no need for consideration of observation or admission.    I have explained the patient´s condition, diagnoses and treatment plan based on the information available to me at this time. I have answered questions and addressed any concerns. The patient has a good  understanding of the patient´s diagnosis, condition, and treatment plan as can be expected at this point. The vital signs have been stable. The patient´s condition is stable and appropriate for discharge from the emergency department.      The patient will pursue further outpatient evaluation with the primary care physician or other designated or consulting physician as outlined in the discharge instructions. They are agreeable to this plan of care and follow-up instructions have been explained in detail. The patient has received these instructions in written format and have expressed an understanding of the discharge instructions. The patient is aware that any significant change in condition or worsening of symptoms should prompt an immediate return to this or the closest emergency department or call to 911.  I have explained discharge medications and the need for follow up with the patient/caretakers. This was also printed in the discharge instructions. Patient was discharged with the following medications and follow up:      Medication List        Changed      * ondansetron ODT 4 MG disintegrating tablet  Commonly known as: ZOFRAN-ODT  Place 1 tablet on the tongue Every 8 (Eight) Hours As Needed for Nausea or Vomiting for up to 10 doses.  What changed: Another medication with the same name was added. Make sure you understand how and when to take each.     * ondansetron ODT 4 MG disintegrating tablet  Commonly known as:  ZOFRAN-ODT  Place 1 tablet on the tongue Every 6 (Six) Hours As Needed for Nausea or Vomiting.  What changed: You were already taking a medication with the same name, and this prescription was added. Make sure you understand how and when to take each.           * This list has 2 medication(s) that are the same as other medications prescribed for you. Read the directions carefully, and ask your doctor or other care provider to review them with you.                   Where to Get Your Medications        These medications were sent to [facility]/pharmacy #[id number] - [place], [place] - [address] - [phone] [facility] [phone]   [address] [place] [address]      Hours: 24-hours Phone: [phone]   ondansetron ODT 4 MG disintegrating tablet      No follow-up provider specified.     Final diagnoses:   Nausea and vomiting, unspecified vomiting type   Generalized abdominal pain        ED Disposition       ED Disposition   Discharge    Condition   Stable    Comment   --               This medical record created using voice recognition software.             Ed Kuhn DO  01/16/24 2216

## 2024-01-11 ENCOUNTER — TELEPHONE (OUTPATIENT)
Dept: CARDIOLOGY | Facility: CLINIC | Age: 69
End: 2024-01-11
Payer: OTHER GOVERNMENT

## 2024-01-11 NOTE — TELEPHONE ENCOUNTER
STEMI: 12/25/23- patient has not been scheduled for f/u after STEMI-     SW patient. [facility]. Patient was questioned if he had f/u with our office by PCP- Informed patient we will need a [facility] referral for the patient to be seen or the cost will be his responsibility.     [facility]ed to call [facility], [facility] to request referral to be sent as soon as possible. Left VM, detailed information, fax number and call back number.     Patient also states he is to have a lung biopsy, scheduled for 1/22/24, informed patient he is unable to stop his Brilinta this soon after cardiac stent placement, stressed importance he discuss with Pulmonology prior to procedure.

## 2024-01-15 NOTE — TELEPHONE ENCOUNTER
I am aware. Please see my notes regarding my plans for biopsy. I think Cady held a spot for him on the schedule just to be safe. We can coordinate his hospitalization and plans for biopsy when I'm back in office tomorrow

## 2024-01-16 ENCOUNTER — READMISSION MANAGEMENT (OUTPATIENT)
Dept: CALL CENTER | Facility: HOSPITAL | Age: 69
End: 2024-01-16
Payer: OTHER GOVERNMENT

## 2024-01-16 NOTE — OUTREACH NOTE
AMI Week 3 Survey      Flowsheet Row Responses   [facility] patient discharged from? [facility]   Does the patient have one of the following disease processes/diagnoses(primary or secondary)? Acute MI (STEMI,NSTEMI)   Week 3 attempt successful? Yes   Call start time 1243   Call end time 1253   Discharge diagnosis STEMI (ST elevation myocardial infarction)   Person spoke with today (if not patient) and relationship Lilly-wife and pt   Meds reviewed with patient/caregiver? Yes   Is the patient taking all medications as directed (includes completed medication regime)? Yes   Has the patient kept scheduled appointments due by today? Yes   Psychosocial comments pt has no heat in trailer other than Kerosine heater   Comments Pt reports pt's face and eye lids are swollen but denies ABY, edema in throat or issues swallowing or edema anywhere else on body. Pt reports he has SOA at baseline.No issues noted while speaking on phone with pt.   What is the patient's perception of their health status since discharge? Same   Nursing interventions Nurse provided patient education, Advised patient to call provider   Is the patient/caregiver able to teach back signs and symptoms of when to call for help immediately: Sudden chest discomfort, Sudden discomfort in arms, back, neck or jaw, Shortness of breath at any time   Is the patient/caregiver able to teach back the hierarchy of who to call/visit for symptoms/problems? PCP, Specialist, Home health nurse, Urgent Care, ED, 911 Yes   Additional teach back comments advised pt to call MD or go to ER for facial edema, pt v/u but does not indicate that he plans to do so.   Week 3 call completed? Yes   Graduated Yes   Call end time 1253            Caitlyn VALENTINO - Registered Nurse

## 2024-01-17 NOTE — TELEPHONE ENCOUNTER
JANEE Perez with [facility]- patient has been assigned to cardiology through [facility]- for clearance please reach out to that office. Patient has f/u/ new appointment with Cardiology on 2/6/24- do not have the name of the cardiologist.

## 2024-01-22 ENCOUNTER — OFFICE VISIT (OUTPATIENT)
Dept: PULMONOLOGY | Facility: CLINIC | Age: 69
End: 2024-01-22
Payer: OTHER GOVERNMENT

## 2024-01-22 ENCOUNTER — TELEPHONE (OUTPATIENT)
Dept: CARDIOLOGY | Facility: CLINIC | Age: 69
End: 2024-01-22
Payer: OTHER GOVERNMENT

## 2024-01-22 VITALS
OXYGEN SATURATION: 91 % | WEIGHT: 131 LBS | SYSTOLIC BLOOD PRESSURE: 93 MMHG | HEIGHT: 73 IN | HEART RATE: 71 BPM | DIASTOLIC BLOOD PRESSURE: 46 MMHG | RESPIRATION RATE: 20 BRPM | BODY MASS INDEX: 17.36 KG/M2

## 2024-01-22 DIAGNOSIS — R91.8 LUNG MASS: ICD-10-CM

## 2024-01-22 DIAGNOSIS — I21.21 ST ELEVATION MYOCARDIAL INFARCTION INVOLVING LEFT CIRCUMFLEX CORONARY ARTERY: Primary | ICD-10-CM

## 2024-01-22 DIAGNOSIS — J90 PLEURAL EFFUSION: ICD-10-CM

## 2024-01-22 DIAGNOSIS — Z72.0 TOBACCO ABUSE: ICD-10-CM

## 2024-01-22 DIAGNOSIS — J44.9 CHRONIC OBSTRUCTIVE PULMONARY DISEASE, UNSPECIFIED COPD TYPE: ICD-10-CM

## 2024-01-22 PROCEDURE — 99214 OFFICE O/P EST MOD 30 MIN: CPT | Performed by: NURSE PRACTITIONER

## 2024-01-22 RX ORDER — ALBUTEROL SULFATE 90 UG/1
2 AEROSOL, METERED RESPIRATORY (INHALATION) EVERY 4 HOURS PRN
Qty: 18 G | Refills: 5 | Status: SHIPPED | OUTPATIENT
Start: 2024-01-22 | End: 2024-02-21

## 2024-01-22 RX ORDER — TIOTROPIUM BROMIDE AND OLODATEROL 3.124; 2.736 UG/1; UG/1
2 SPRAY, METERED RESPIRATORY (INHALATION)
COMMUNITY
End: 2024-01-22 | Stop reason: SDUPTHER

## 2024-01-22 RX ORDER — TIOTROPIUM BROMIDE AND OLODATEROL 3.124; 2.736 UG/1; UG/1
2 SPRAY, METERED RESPIRATORY (INHALATION)
Qty: 1 EACH | Refills: 5 | Status: SHIPPED | OUTPATIENT
Start: 2024-01-22 | End: 2024-02-21

## 2024-01-22 NOTE — TELEPHONE ENCOUNTER
[facility]CEIVED [facility] REFERRAL. CALLED PT TO SCHEDULE AN APPT. FEMALE ANSWERED AND STATED PT NOT THERE. ASKED IF SHE HAD ANOTHER NUMBER FOR PT. SHE STATED NO. DID NOT LEAVE A MESSAGE AS WE DO NOT HAVE A VERBAL ON FILE.

## 2024-01-22 NOTE — PROGRESS NOTES
Primary Care Provider  Antony Florence MD     Referring Provider  No ref. provider found     Chief Complaint  Cough, Shortness of Breath, Wheezing, Hospital Follow Up Visit, and Lung Mass    Subjective          History of Presenting Illness  Patient is a 68-year-old male who was recently hospitalized at [facility] from 12/25/2023 to 12/29/2023 for STEMI and pneumonia who presents for a follow-up visit today.  Per discharge summary report, patient does have past medical history significant for hypertension, COPD, current active tobacco use who presents to the emergency room for evaluation for substernal chest pain that started a few hours prior to arrival to the emergency room.  Pain was radiating down the left arm and was associated with nausea, vomiting, mild shortness of breath.  Patient has syncopal episode while he was in the ambulance.  In the emergency room patient was afebrile, heart rate was within normal limits, respiratory rate was within normal limits and blood pressure was borderline low.  Patient required 4 L oxygen per minute via nasal cannula to keep his O2 saturations greater than 90%.  Sodium level was found to be low at 122.  EKG was positive for ischemic changes concerning for STEMI.  STEMI alert was activated, cardiology consulted.  Patient underwent a cardiac catheterization with 2 THERON placed in the proximal left circumflex and mid LAD.  Patient noted to have residual distal left circumflex stenosis 7075% range.  Cardiology planning on further intervention as an outpatient following discharge.  Patient tolerated procedure well and patient was transferred to ICU for further management.  Hospitalist service was consulted for further management.  Patient was started on dual antiplatelet therapy, metoprolol, and atorvastatin.  Patient with her Following.  HSV skin attained demonstrated a large mass in the right upper lobe.  Further workup per pulmonology postdischarge regarding this  mass.  Patient was started IV fluids.  Improved.  Patient was found to have 1 of 2 initial blood cultures positive for strep species, spec contaminant for discharge and report.  Per discharge summary report, repeat blood cultures pending at discharge however no growth to date.  Patient still required 1 L of oxygen maintain O2 saturations at 89% or greater at discharge.  However, [facility] the patient not qualify for supplemental oxygen and thus they would not provide this for him per discharge summary report.  Of note, patient did have an ER visit on 1/1/2024 after hospitalization due to lower extremity edema that was worsening for a period of 2 days due to not being able to get any of his medications.  Patient was written for his medications and was advised to follow-up with his primary care provider.  Patient states that since hospital stay he is feeling better.  Patient states that he does get short of breath that is worse with exertion, mild to moderate in severity, and improved with rest.  Patient states that he is taking Stiolto every day as prescribed and uses albuterol inhaler as needed.  Patient states that he is still smoking and is not ready to set a quit date at this time.  Patient states that he is taking Brilinta and will be following up with his cardiologist.  Patient denies fever, chills, night sweats, swollen glands in the head and neck, unintentional weight loss, hemoptysis, purulent sputum production, dysphagia, chest pain, palpitations, chest tightness, abdominal pain, nausea, vomiting, and diarrhea.  Patient also denies any myalgias, changes in sense of taste and/or smell, sore throat, any other coronavirus or flu-like symptoms.  Patient denies any leg swelling, orthopnea, paroxysmal nocturnal dyspnea.  Patient also denies any hematuria, hematochezia, hematemesis, and epistaxis.  Patient is able to perform activities of daily living.        Review of Systems   Constitutional:  Positive for  fatigue. Negative for activity change, appetite change, chills, diaphoresis, fever, unexpected weight gain and unexpected weight loss.        Negative for Insomnia   HENT:  Negative for congestion (Nasal), mouth sores, nosebleeds, postnasal drip, sore throat, swollen glands and trouble swallowing.         Negative for Thrush  Negative for Hoarseness  Negative for Allergies/Hay Fever  Negative for Recent Head injury  Negative for Ear Fullness  Negative for Nasal or Sinus pain  Negative for Dry lips  Negative for Nasal discharge   Respiratory:  Positive for cough (intermittent), shortness of breath and wheezing (intermittent). Negative for apnea and chest tightness.         Negative for Hemoptysis  Negative for Pleuritic pain   Cardiovascular:  Negative for chest pain, palpitations and leg swelling.        Negative for Claudication  Negative for Cyanosis  Negative for Dyspnea on exertion   Gastrointestinal:  Negative for abdominal pain, diarrhea, nausea, vomiting and GERD.   Musculoskeletal:  Negative for joint swelling and myalgias.        Negative for Joint pain  Negative for Joint stiffness   Skin:  Negative for color change, dry skin, pallor and rash.   Neurological:  Negative for syncope, weakness and headache.   Hematological:  Negative for adenopathy. Does not bruise/bleed easily.        History reviewed. No pertinent family history.     Social History     Socioeconomic History    Marital status:    Tobacco Use    Smoking status: Every Day     Packs/day: 0.50     Years: 50.00     Additional pack years: 0.00     Total pack years: 25.00     Types: Cigarettes     Start date: 1999    Smokeless tobacco: Never    Tobacco comments:     Smoking 1 cigarette every 2-3 days. -1--js   Vaping Use    Vaping Use: Never used   Substance and Sexual Activity    Alcohol use: Never    Drug use: Never    Sexual activity: Defer        Past Medical History:   Diagnosis Date    Arthritis     COPD (chronic obstructive  pulmonary disease)     Hypertension         Immunization History   Administered Date(s) Administered    COVID-19 (MODERNA) BIVALENT 12+YRS 02/09/2023    COVID-19 (MODERNA) Monovalent Original Booster 11/24/2021    COVID-19 F23 (PFIZER) 12YRS+ (COMIRNATY) 12/22/2023    Fluzone (or Fluarix & Flulaval for VFC) >6mos 11/01/2022, 10/31/2023    Shingrix 11/01/2022, 10/31/2023    Td (TDVAX) 03/09/1997       No Known Allergies       Current Outpatient Medications:     albuterol sulfate  (90 Base) MCG/ACT inhaler, Inhale 2 puffs Every 4 (Four) Hours As Needed for Wheezing or Shortness of Air for up to 30 days., Disp: 18 g, Rfl: 5    amLODIPine (NORVASC) 10 MG tablet, Take 1 tablet by mouth Daily., Disp: 30 tablet, Rfl: 0    aspirin 81 MG EC tablet, Take 1 tablet by mouth Daily., Disp: 30 tablet, Rfl: 0    atorvastatin (LIPITOR) 40 MG tablet, Take 1 tablet by mouth Every Night., Disp: 30 tablet, Rfl: 0    Cholecalciferol 25 MCG (1000 UT) tablet, Take 2 tablets by mouth Daily., Disp: , Rfl:     gabapentin (NEURONTIN) 600 MG tablet, Take 1.5 tablets by mouth 3 (Three) Times a Day., Disp: , Rfl:     HYDROcodone-acetaminophen (NORCO)  MG per tablet, Take 1 tablet by mouth Every 8 (Eight) Hours As Needed for Moderate Pain., Disp: , Rfl:     lisinopril (PRINIVIL,ZESTRIL) 10 MG tablet, Take 1 tablet by mouth Daily. Script is written to take one-half tablet by mouth daily for blood pressure but pt said he takes a full tab, Disp: 30 tablet, Rfl: 0    nitroglycerin (NITROSTAT) 0.4 MG SL tablet, Place 1 tablet under the tongue Every 5 (Five) Minutes As Needed for Chest Pain. Take no more than 3 doses in 15 minutes., Disp: 20 tablet, Rfl: 0    ondansetron ODT (ZOFRAN-ODT) 4 MG disintegrating tablet, Place 1 tablet on the tongue Every 8 (Eight) Hours As Needed for Nausea or Vomiting for up to 10 doses., Disp: 10 tablet, Rfl: 0    ticagrelor (BRILINTA) 90 MG tablet tablet, Take 1 tablet by mouth 2 (Two) Times a Day., Disp: 60  "tablet, Rfl: 0    tiotropium bromide-olodaterol (Stiolto Respimat) 2.5-2.5 MCG/ACT aerosol solution inhaler, Inhale 2 puffs Daily for 30 days., Disp: 1 each, Rfl: 5    acyclovir (ZOVIRAX) 400 MG tablet, Take 1 tablet by mouth 5 (Five) Times a Day. Take no more than 5 doses a day. (Patient not taking: Reported on 1/22/2024), Disp: 50 tablet, Rfl: 0    furosemide (LASIX) 40 MG tablet, Take 1 tablet by mouth Daily., Disp: 5 tablet, Rfl: 0    metoprolol succinate XL (TOPROL-XL) 25 MG 24 hr tablet, Take 1 tablet by mouth Daily., Disp: 30 tablet, Rfl: 0    nicotine (NICODERM CQ) 14 MG/24HR patch, Place 1 patch on the skin as directed by provider Daily As Needed (nicotine withdrawal). (Patient not taking: Reported on 1/22/2024), Disp: 30 patch, Rfl: 0    ondansetron ODT (ZOFRAN-ODT) 4 MG disintegrating tablet, Place 1 tablet on the tongue Every 6 (Six) Hours As Needed for Nausea or Vomiting. (Patient not taking: Reported on 1/22/2024), Disp: 12 tablet, Rfl: 0     Objective     Physical Exam  Vital Signs:   WDWN, Alert, NAD.    HEENT:  PERRL, EOMI.  OP, nares clear, no sinus tenderness  Neck:  Supple, no JVD, no thyromegaly.  Lymph: no axillary, cervical, supraclavicular lymphadenopathy noted bilaterally  Chest: Barrel chested, diminished breath sounds bilaterally. No wheezes, rales, or rhonchi appreciated.  Normal work of breathing noted.  Patient is able speak full sentences without difficulty.  CV: RRR, no MGR, pulses 2+, equal.  Abd:  Soft, NT, ND, + BS, no HSM  EXT:  no clubbing, no cyanosis, no edema, no joint tenderness  Neuro:  A&Ox3, CN grossly intact, no focal deficits.  Skin: No rashes or lesions noted.    BP 93/46 (BP Location: Left arm, Patient Position: Sitting, Cuff Size: Small Adult)   Pulse 71   Resp 20   Ht 185.4 cm (72.99\")   Wt 59.4 kg (131 lb)   SpO2 91% Comment: room air  BMI 17.29 kg/m²         Result Review :   I have reviewed discharge summary and imaging study reports from recent hospital " stay.  See scanned reports.    Procedures:      XR Chest 1 View    Result Date: 1/10/2024    1. Emphysematous changes 2. Right upper lobe mass measuring approximately 10.9 cm 3. Right hilar and mediastinal adenopathy suspected to be metastatic       Leonardo Mir M.D.       Electronically Signed and Approved By: Leonardo Mir M.D. on 1/10/2024 at 13:50             XR Chest 1 View    Result Date: 1/1/2024    Emphysema.  Large right upper lobe mass is unchanged.  New small right effusion.       AJ TRAN MD       Electronically Signed and Approved By: AJ TRAN MD on 1/01/2024 at 15:00             CT Chest Without Contrast Diagnostic    Result Date: 12/26/2023    1. There is a large mass that has developed in the right upper lobe.  There are calcifications within the mass.  While it is possible this could relate to granulomatous exposure or possibly sarcoid, a malignancy could not be excluded. 2. Calcified mediastinal and right hilar lymph nodes are suggested. 3. Calcification in what looks like probable soft tissue mass right upper quadrant abdomen 4. Bibasilar effusions 5. Emphysematous changes most marked upper lobes 6. Coronary artery calcification.  FNA of mass would be recommended.    LUKE MILLER MD       Electronically Signed and Approved By: LUKE MILLER MD on 12/26/2023 at 16:53             Cardiac Catheterization/Vascular Study    Addendum Date: 12/26/2023    OPERATORS Jeancarlos White M.D. (Attending Cardiologist)  PROCEDURES PERFORMED Ultrasound guided Vascular access Left Heart Catheterization Coronary Angiogram PCI of left circumflex artery IVUS of left circumflex artery PCI of mid LAD IVUS of LAD Moderate sedation  INDICATIONS FOR PROCEDURE 68 years old man with multiple cardiovascular risk factors presented to the hospital with chest pain and dynamic ECG changes in the anterolateral leads.  After receiving nitroglycerin he became severely hypotensive into 70s systolic.  After emergent discussion  of risk and benefit of the procedure he was brought to the Cath Lab for definitive coronary angiography.  PROCEDURE IN DETAIL Informed consent was obtained from the patient after explaining the risks, benefits, and alternative options of the procedure. After obtaining informed consent, the patient was brought to the cath lab and was prepped in a sterile fashion. Lidocaine 2% was used for local anesthesia into the right femoral access site. The right femoral artery was accessed with a micropuncture needle via modified Seldinger technique under ultrasound guidance. A 6F was inserted successfully. Afterwards, 6F JR4 and JL5 diagnostic catheters were advanced over a wire into the ascending aorta and were used to engage the ostia of the left main and RCA respectively. JR4 used to cross the AV and obtain LV pressures and gradient across the AV measured via pullback technique. Images of the right and left coronary systems were obtained.  HEMODYNAMICS  LV: 115/0, 3 mmHg AO: 119/69, 89 mmHg No significant gradient across the aortic valve during pullback of JR4 catheter.  LV gram was not performed during the study.  FINDINGS Coronary Angiogram Right dominant circulation Left main: Left main is a large caliber vessel which gives rise to the Left Anterior Descending and the Left circumflex.  Ostium of the left main has 20% stenosis. Left Anterior Descending Artery: LAD is a medium caliber vessel which gives rise to several septal perforators and several diagonal branches.  Mid LAD has 80% tubular stenosis.  JANELL-3 flow Left Circumflex: Left circumflex artery gives rise to obtuse marginals.  Proximal left circumflex has tubular 80% hazy appearing stenosis.  Mid segment of left circumflex at bifurcation with OM has a complex 70% stenosis in both segments.  JANELL-3 flow Right Coronary Artery: The RCA is a medium caliber vessel gives rise to PDA and PLV.  Mid RCA has diffuse 40% stenosis. Percutaneous coronary intervention  Diagnostic coronary angiography suggested both left circumflex and LAD to be contributed to ischemia and symptoms.  Left circumflex being the worst of the 2 vessels. 100 units/kg of heparin was administered and ACT of more than 250 was documented. A 6 Italian XB 4.0 guide catheter was used to engage the ostium of the left main coronary artery. 0.014 run-through wire was then advanced into the left circumflex artery. I then advanced intravascular ultrasound catheter into the proximal segment of left circumflex. IVUS showed proximal left circumflex to be 3 mm and mid left circumflex to be 3.5 mm in dimension.  IVUS also showed calcification with thrombus in the proximal left circumflex. The lesion in the proximal left circumflex was predilated with 2.5 x 12 mm compliant balloon at 12 lisa. This was followed by placement of 3 x 15 mm Xience magi point stent which was then postdilated with 3.25 x 12 mm noncompliant balloon at 14 lisa. I then noted a new 50% stenosis at the ostium of left circumflex artery which was treated with balloon angioplasty by a 3 x 12 mm compliant balloon at 12 lisa. Final angiography showed 20-30% stenosis at the ostium of left circumflex artery.  JANELL-3 flow in the left circumflex artery and OM.  I did not treat mid left circumflex vessel as it was a complex bifurcating lesion which would have required more contrast and time while the patient was hypotensive receiving pressors and IV fluid. I then quickly diverted my attention to the LAD which was wired with 0.014 run-through wire. Intravascular ultrasound catheter was then advanced into the mid LAD and a slow manual pullback was performed. IVUS showed LAD size to be 3.5 mm.  IVUS also confirmed 80% calcific stenosis in the mid LAD. I then predilated this lesion with 3.25 x 12 mm noncompliant balloon at 12 lisa. This was followed by placement of 3.25 x 33 mm Xience magi point stent. The stent was postdilated with 3.5 x 12 mm noncompliant balloon at  16 lisa. Final angiography showed 0% stenosis in LAD and JANELL-3 flow. All the catheters were exchanged over a wire and subsequently removed.  6 [place] introducer sheath was left in place to be pulled manually at a later time when ACT is less than 150  ESTIMATED BLOOD LOSS: 10 ml  COMPLICATIONS: None  IMPRESSIONS PCI of proximal left circumflex and mid LAD. Low LVEDP  RECOMMENDATIONS -Start dual antiplatelet therapy, high intensity statin and beta-blocker -Staged bifurcation PCI of mid left circumflex artery/OM -Smoking cessation -Obtain an echocardiogram -Cardiac rehab at the time of discharge. Electronically signed by Jeancarlos White MD, 12/25/23, 9:52 PM EST.       Addendum Date: 12/26/2023    OPERATORS Jeancarlos White M.D. (Attending Cardiologist)  PROCEDURES PERFORMED Ultrasound guided Vascular access Left Heart Catheterization Coronary Angiogram PCI of left circumflex artery IVUS of left circumflex artery PCI of mid LAD IVUS of LAD Moderate sedation  INDICATIONS FOR PROCEDURE 68 years old man with multiple cardiovascular risk factors presented to the hospital with chest pain and dynamic ECG changes in the anterolateral leads.  After receiving nitroglycerin he became severely hypotensive into 70s systolic.  After emergent discussion of risk and benefit of the procedure he was brought to the Cath Lab for definitive coronary angiography.  PROCEDURE IN DETAIL Informed consent was obtained from the patient after explaining the risks, benefits, and alternative options of the procedure. After obtaining informed consent, the patient was brought to the cath lab and was prepped in a sterile fashion. Lidocaine 2% was used for local anesthesia into the right femoral access site. The right femoral artery was accessed with a micropuncture needle via modified Seldinger technique under ultrasound guidance. A 6F was inserted successfully. Afterwards, 6F JR4 and JL5 diagnostic catheters were advanced over a wire into the ascending  aorta and were used to engage the ostia of the left main and RCA respectively. JR4 used to cross the AV and obtain LV pressures and gradient across the AV measured via pullback technique. Images of the right and left coronary systems were obtained.  HEMODYNAMICS  LV: 115/0, 3 mmHg AO: 119/69, 89 mmHg No significant gradient across the aortic valve during pullback of JR4 catheter.  LV gram was not performed during the study.  FINDINGS Coronary Angiogram Right dominant circulation Left main: Left main is a large caliber vessel which gives rise to the Left Anterior Descending and the Left circumflex.  Ostium of the left main has 20% stenosis. Left Anterior Descending Artery: LAD is a medium caliber vessel which gives rise to several septal perforators and several diagonal branches.  Mid LAD has 80% tubular stenosis.  JANELL-3 flow Left Circumflex: Left circumflex artery gives rise to obtuse marginals.  Proximal left circumflex has tubular 80% hazy appearing stenosis.  Mid segment of left circumflex at bifurcation with OM has a complex 70% stenosis in both segments.  JANELL-3 flow Right Coronary Artery: The RCA is a medium caliber vessel gives rise to PDA and PLV.  Mid RCA has diffuse 40% stenosis. Percutaneous coronary intervention Diagnostic coronary angiography suggested both left circumflex and LAD to be contributed to ischemia and symptoms.  Left circumflex being the worst of the 2 vessels. 100 units/kg of heparin was administered and ACT of more than 250 was documented. 0.014 run-through wire was then advanced into the left circumflex artery. I then advanced intravascular ultrasound catheter into the proximal segment of left circumflex. IVUS showed proximal left circumflex to be 3 mm and mid left circumflex to be 3.5 mm in dimension.  IVUS also showed calcification with thrombus in the proximal left circumflex. The lesion in the proximal left circumflex was predilated with 2.5 x 12 mm compliant balloon at 12 lisa. This  was followed by placement of 3 x 15 mm Xience magi point stent which was then postdilated with 3.25 x 12 mm noncompliant balloon at 14 lisa. I then noted a new 50% stenosis at the ostium of left circumflex artery which was treated with balloon angioplasty by a 3 x 12 mm compliant balloon at 12 lisa. Final angiography showed 20-30% stenosis at the ostium of left circumflex artery.  JANELL-3 flow in the left circumflex artery and OM.  I did not treat mid left circumflex vessel as it was a complex bifurcating lesion which would have required more contrast and time while the patient was hypotensive receiving pressors and IV fluid. I then quickly diverted my attention to the LAD which was wired with 0.014 run-through wire. Intravascular ultrasound catheter was then advanced into the mid LAD and a slow manual pullback was performed. IVUS showed LAD size to be 3.5 mm.  IVUS also confirmed 80% calcific stenosis in the mid LAD. I then predilated this lesion with 3.25 x 12 mm noncompliant balloon at 12 lisa. This was followed by placement of 3.25 x 33 mm Xience magi point stent. The stent was postdilated with 3.5 x 12 mm noncompliant balloon at 16 lisa. Final angiography showed 0% stenosis in LAD and JANELL-3 flow. All the catheters were exchanged over a wire and subsequently removed.  6 [place] introducer sheath was left in place to be pulled manually at a later time when ACT is less than 150  ESTIMATED BLOOD LOSS: 10 ml  COMPLICATIONS: None  IMPRESSIONS PCI of proximal left circumflex and mid LAD. Low LVEDP  RECOMMENDATIONS -Start dual antiplatelet therapy, high intensity statin and beta-blocker -Staged bifurcation PCI of mid left circumflex artery/OM -Smoking cessation -Obtain an echocardiogram -Cardiac rehab at the time of discharge. Electronically signed by Jeancarlos White MD, 12/25/23, 9:52 PM EST.       XR Chest 1 View    Result Date: 12/25/2023   Consolidation within right upper lung, concerning for pneumonia.  Recommend follow-up  CXR in 4-6 weeks to document resolution.       BLAIR ROBERSON MD       Electronically Signed and Approved By: BLAIR ROBERSON MD on 12/25/2023 at 19:53                 Assessment and Plan      Assessment:  1. Lung mass highly concerning for primary bronchogenic carcinoma.  Could potentially be invasive fungal infection as well.  2. STEMI.  3. Acute hypoxemic respiratory failure.  Patient is now on room air.  4. Acute cardiogenic pulmonary edema with bilateral pleural effusions during hospitalization.  5. Small bilateral pleural effusions  6. Suspected community-acquired pneumonia from unspecified organism.  7. COPD without exacerbation.  8. Severe protein calorie malnutrition with muscle wasting.  9. Tobacco abuse cigarettes ongoing.          Plan:  1.  Continue Stiolto as prescribed.  2.  Continue albuterol inhaler as needed.  3.  Will order a PET scan, pulmonary function test, 6-minute walk test, and alpha-1 antitrypsin level and genotype.  4.  Will have patient follow-up on 2/15/2024 as patient will need to be admitted to the hospital to safely hold Brilinta and bridge with either heparin or cangrelor for him to have a biopsy of his lung mass.  Nurse navigator did speak with the patient while he was in the office today.  Treatment plan discussed with the patient.  Patient is in agreement with the treatment plan.  5.  Patient's BMI and weight were discussed.  Recommend 3000-calorie/day diet as well as 15 to 30 minutes daily exercise.  Offered dietary referral however patient declined.  6.  Vaccination status: patient reports they are up-to-date with flu and Covid vaccines.  Patient is advised to check on the status of his pneumonia vaccine and to notify our office.  Patient is advised to continue to follow CDC recommendations such as social distancing wearing a mask and washing hands for at least 20 seconds.  7.  Smoking status: patient is a current cigarette smoker.  I counseled the patient on smoking  cessation.  I counseled the patient on the risks of continued smoking including the risk of lung cancer, head and neck cancer, renal cancer, heart disease, stroke, and early death.  Patient refuses nicotine replacement therapy or pharmacotherapy at this time.  Patient is advised to decrease the number of cigarettes they are smoking up until the point to where they can quit.  8.  Patient to call the office, 911, or go to the ER with new or worsening symptoms.  9.  Follow-up on 2/15/2024, sooner if needed.             Follow Up   No follow-ups on file.  Patient was given instructions and counseling regarding his condition or for health maintenance advice. Please see specific information pulled into the AVS if appropriate.

## 2024-01-22 NOTE — PATIENT INSTRUCTIONS
Chronic Obstructive Pulmonary Disease Exacerbation    Chronic obstructive pulmonary disease (COPD) is a long-term (chronic) condition that affects the lungs. COPD is a general term that can be used to describe many different lung problems that cause lung inflammation and limit airflow, including chronic bronchitis and emphysema. COPD exacerbations are episodes when breathing symptoms flare up, become much worse, and require extra treatment.  COPD exacerbations are usually caused by infections. Without treatment, COPD exacerbations can be severe and even life threatening. Frequent COPD exacerbations can cause further damage to the lungs.  What are the causes?  This condition may be caused by:  Respiratory infections, including viral and bacterial infections.  Exposure to smoke.  Exposure to air pollution, chemical fumes, or dust.  Things that can cause an allergic reaction (allergens).  Not taking your usual COPD medicines as directed.  Underlying medical problems, such as congestive heart failure or infections not involving the lungs.  In many cases, the cause of this condition is not known.  What increases the risk?  The following factors may make you more likely to develop this condition:  Smoking cigarettes.  Being an older adult.  Having frequent prior COPD exacerbations.  What are the signs or symptoms?  Symptoms of this condition include:  Increased coughing.  Increased production of mucus from your lungs.  Increased wheezing and shortness of breath.  Rapid or labored breathing.  Chest tightness.  Less energy than usual.  Sleep disruption from symptoms.  Confusion  Increased sleepiness.  Often, these symptoms happen or get worse even with the use of medicines.  How is this diagnosed?  This condition is diagnosed based on:  Your medical history.  A physical exam.  You may also have tests, including:  A chest X-ray.  Blood tests.  Lung (pulmonary) function tests.  How is this treated?  Treatment for this  condition depends on the severity and cause of the symptoms. You may need to be admitted to a hospital for treatment. Some of the treatments commonly used to treat COPD exacerbations are:  Antibiotic medicines. These may be used for severe exacerbations caused by a lung infection, such as pneumonia.  Bronchodilators. These are inhaled medicines that expand the air passages and allow increased airflow. They may make your breathing more comfortable.  Steroid medicines. These act to reduce inflammation in the airways. They may be given with an inhaler, taken by mouth, or given through an IV tube inserted into one of your veins.  Supplemental oxygen therapy.  Airway clearing techniques, such as noninvasive ventilation (NIV) and positive expiratory pressure (PEP). These provide respiratory support through a mask or other noninvasive device. An example of this would be using a continuous positive airway pressure (CPAP) machine to improve delivery of oxygen into your lungs.  Follow these instructions at home:  Medicines  Take over-the-counter and prescription medicines only as told by your health care provider.  It is important to use correct technique with inhaled medicines.  If you were prescribed an antibiotic medicine or oral steroid, take it as told by your health care provider. Do not stop taking the medicine even if you start to feel better.  Lifestyle  Do not use any products that contain nicotine or tobacco. These products include cigarettes, chewing tobacco, and vaping devices, such as e-cigarettes. If you need help quitting, ask your health care provider.  Eat a healthy diet.  Exercise regularly.  Get enough sleep. Most adults need 7 or more hours per night.  Avoid exposure to all substances that irritate the airway, especially tobacco smoke.  Regularly wash your hands with soap and water for at least 20 seconds. If soap and water are not available, use hand . This may help prevent you from getting  infections.  During flu season, avoid enclosed spaces that are crowded with people.  General instructions  Drink enough fluid to keep your urine pale yellow, unless you have a medical condition that requires fluid restriction.  Use a cool mist vaporizer. This humidifies the air and makes it easier for you to clear your chest when you cough.  If you have a home nebulizer and oxygen, continue to use them as told by your health care provider.  Keep all follow-up visits. This is important.  How is this prevented?  Stay up-to-date on pneumococcal and flu (influenza) vaccines. A flu shot is recommended every year to help prevent exacerbations.  Quitting smoking is very important in preventing COPD from getting worse and in preventing exacerbations from happening as often.  Follow all instructions for pulmonary rehabilitation after a recent exacerbation. This can help prevent future exacerbations.  Work with your health care provider to develop and follow an action plan. This tells you what steps to take when you experience certain symptoms.  Contact a health care provider if:  You have a worsening of your regular COPD symptoms.  Get help right away if:  You have worsening shortness of breath, even when resting.  You have trouble talking.  You have severe chest pain.  You cough up blood.  You have a fever.  You have weakness, vomit repeatedly, or faint.  You feel confused.  You are not able to sleep because of your symptoms.  You have trouble doing daily activities.  These symptoms may represent a serious problem that is an emergency. Do not wait to see if the symptoms will go away. Get medical help right away. Call your local emergency services (911 in the [place] Do not drive yourself to the hospital.  Summary  COPD exacerbations are episodes when breathing symptoms become much worse and require extra treatment above your normal treatment.  Exacerbations can be severe and even life threatening. Frequent COPD exacerbations  can cause further damage to your lungs.  COPD exacerbations are usually triggered by infections such as the flu, colds, and even pneumonia.  Treatment for this condition depends on the severity and cause of the symptoms. You may need to be admitted to a hospital for treatment.  Quitting smoking is very important to prevent COPD from getting worse and to prevent exacerbations from happening as often.  This information is not intended to replace advice given to you by your health care provider. Make sure you discuss any questions you have with your health care provider.  Document Revised: 10/26/2021 Document Reviewed: 10/26/2021  [employer] Patient Education © 2023 [employer]  Managing the Challenge of Quitting Smoking  Quitting smoking is a physical and mental challenge. You may have cravings, withdrawal symptoms, and temptation to smoke. Before quitting, work with your health care provider to make a plan that can help you manage quitting. Making a plan before you quit may keep you from smoking when you have the urge to smoke while trying to quit.  How to manage lifestyle changes  Managing stress  Stress can make you want to smoke, and wanting to smoke may cause stress. It is important to find ways to manage your stress. You could try some of the following:  Practice relaxation techniques.  Breathe slowly and deeply, in through your nose and out through your mouth.  Listen to music.  Soak in a bath or take a shower.  Imagine a peaceful place or vacation.  Get some support.  Talk with family or friends about your stress.  Join a support group.  Talk with a counselor or therapist.  Get some physical activity.  Go for a walk, run, or bike ride.  Play a favorite sport.  Practice yoga.    Medicines  Talk with your health care provider about medicines that might help you deal with cravings and make quitting easier for you.  Relationships  Social situations can be difficult when you are quitting smoking. To manage this,  you can:  Avoid parties and other social situations where people might be smoking.  Avoid alcohol.  Leave right away if you have the urge to smoke.  Explain to your family and friends that you are quitting smoking. Ask for support and let them know you might be a bit grumpy.  Plan activities where smoking is not an option.  General instructions  Be aware that many people gain weight after they quit smoking. However, not everyone does. To keep from gaining weight, have a plan in place before you quit, and stick to the plan after you quit. Your plan should include:  Eating healthy snacks. When you have a craving, it may help to:  Eat popcorn, or try carrots, celery, or other cut vegetables.  Chew sugar-free gum.  Changing how you eat.  Eat small portion sizes at meals.  Eat 4-6 small meals throughout the day instead of 1-2 large meals a day.  Be mindful when you eat. You should avoid watching television or doing other things that might distract you as you eat.  Exercising regularly.  Make time to exercise each day. If you do not have time for a long workout, do short bouts of exercise for 5-10 minutes several times a day.  Do some form of strengthening exercise, such as weight lifting.  Do some exercise that gets your heart beating and causes you to breathe deeply, such as walking fast, running, swimming, or biking. This is very important.  Drinking plenty of water or other low-calorie or no-calorie drinks. Drink enough fluid to keep your urine pale yellow.    How to recognize withdrawal symptoms  Your body and mind may experience discomfort as you try to get used to not having nicotine in your system. These effects are called withdrawal symptoms. They may include:  Feeling hungrier than normal.  Having trouble concentrating.  Feeling irritable or restless.  Having trouble sleeping.  Feeling depressed.  Craving a cigarette.  These symptoms may surprise you, but they are normal to have when quitting smoking.  To manage  withdrawal symptoms:  Avoid places, people, and activities that trigger your cravings.  Remember why you want to quit.  Get plenty of sleep.  Avoid coffee and other drinks that contain caffeine. These may worsen some of your symptoms.  How to manage cravings  Come up with a plan for how to deal with your cravings. The plan should include the following:  A definition of the specific situation you want to deal with.  An activity or action you will take to replace smoking.  A clear idea for how this action will help.  The name of someone who could help you with this.  Cravings usually last for 5-10 minutes. Consider taking the following actions to help you with your plan to deal with cravings:  Keep your mouth busy.  Chew sugar-free gum.  Suck on hard candies or a straw.  Brush your teeth.  Keep your hands and body busy.  Change to a different activity right away.  Squeeze or play with a ball.  Do an activity or a hobby, such as making bead jewelry, practicing needlepoint, or working with wood.  Mix up your normal routine.  Take a short exercise break. Go for a quick walk, or run up and down stairs.  Focus on doing something kind or helpful for someone else.  Call a friend or family member to talk during a craving.  Join a support group.  Contact a quitline.  Where to find support  To get help or find a support group:  Call the [employer] [place] Smoking Quitline: 4-004-QUIT-NOW (095-0829)  Text QUIT to SmokefreeTXT: 292780  Where to find more information  Visit these websites to find more information on quitting smoking:  [place] Department of Health and Human Services: www.smokefree.gov  American Lung Association: www.freedomfromsmoking.org  Centers for Disease Control and Prevention (CDC): www.cdc.gov  American Heart Association: www.heart.org  Contact a health care provider if:  You want to change your plan for quitting.  The medicines you are taking are not helping.  Your eating feels out of control or you  cannot sleep.  You feel depressed or become very anxious.  Summary  Quitting smoking is a physical and mental challenge. You will face cravings, withdrawal symptoms, and temptation to smoke again. Preparation can help you as you go through these challenges.  Try different techniques to manage stress, handle social situations, and prevent weight gain.  You can deal with cravings by keeping your mouth busy (such as by chewing gum), keeping your hands and body busy, calling family or friends, or contacting a quitline for people who want to quit smoking.  You can deal with withdrawal symptoms by avoiding places where people smoke, getting plenty of rest, and avoiding drinks that contain caffeine.  This information is not intended to replace advice given to you by your health care provider. Make sure you discuss any questions you have with your health care provider.  Document Revised: 12/09/2022 Document Reviewed: 12/09/2022  [employer] Patient Education © 2023 [employer]

## 2024-01-26 ENCOUNTER — HOSPITAL ENCOUNTER (OUTPATIENT)
Dept: PET IMAGING | Facility: HOSPITAL | Age: 69
Discharge: HOME | End: 2024-01-26
Payer: OTHER GOVERNMENT

## 2024-01-26 DIAGNOSIS — R91.8 LUNG MASS: ICD-10-CM

## 2024-01-26 PROCEDURE — A9552 F18 FDG: HCPCS | Performed by: NURSE PRACTITIONER

## 2024-01-26 PROCEDURE — 0 FLUDEOXYGLUCOSE F18 SOLUTION: Performed by: NURSE PRACTITIONER

## 2024-01-26 PROCEDURE — 78815 PET IMAGE W/CT SKULL-THIGH: CPT

## 2024-01-26 RX ADMIN — FLUDEOXYGLUCOSE F 18 1 DOSE: 200 INJECTION, SOLUTION INTRAVENOUS at 14:01

## 2024-01-29 ENCOUNTER — PREP FOR SURGERY (OUTPATIENT)
Dept: OTHER | Facility: HOSPITAL | Age: 69
End: 2024-01-29
Payer: OTHER GOVERNMENT

## 2024-01-29 DIAGNOSIS — J90 PLEURAL EFFUSION: Primary | ICD-10-CM

## 2024-01-29 DIAGNOSIS — R94.8 ABNORMAL POSITRON EMISSION TOMOGRAPHY (PET) SCAN: ICD-10-CM

## 2024-01-29 DIAGNOSIS — C34.90 PRIMARY MALIGNANT NEOPLASM OF LUNG METASTATIC TO OTHER SITE, UNSPECIFIED LATERALITY: Primary | ICD-10-CM

## 2024-01-30 ENCOUNTER — HOSPITAL ENCOUNTER (OUTPATIENT)
Dept: INFUSION THERAPY | Facility: HOSPITAL | Age: 69
Discharge: HOME | End: 2024-01-30
Attending: INTERNAL MEDICINE | Admitting: INTERNAL MEDICINE
Payer: OTHER GOVERNMENT

## 2024-01-30 ENCOUNTER — APPOINTMENT (OUTPATIENT)
Dept: GENERAL RADIOLOGY | Facility: HOSPITAL | Age: 69
End: 2024-01-30
Payer: OTHER GOVERNMENT

## 2024-01-30 DIAGNOSIS — J90 PLEURAL EFFUSION: ICD-10-CM

## 2024-01-30 LAB
ALBUMIN FLD-MCNC: 1.8 G/DL
APPEARANCE FLD: CLEAR
CHOLESTEROL FLUID: 34 MG/DL
COLOR FLD: YELLOW
GLUCOSE FLD-MCNC: 100 MG/DL
LYMPHOCYTES NFR FLD MANUAL: 43 %
MACROPHAGE FLUID: 47 %
MESOTHL CELL NFR FLD MANUAL: 2 %
MONOCYTES NFR FLD: 3 %
NEUTROPHILS NFR FLD MANUAL: 5 %
NUC CELL # FLD: 168 /MM3
PH FLD: 8 [PH]
PROT FLD-MCNC: 2.9 G/DL
RBC # FLD AUTO: <2000 /MM3
TRIGL FLD-MCNC: 11 MG/DL

## 2024-01-30 PROCEDURE — 89051 BODY FLUID CELL COUNT: CPT | Performed by: INTERNAL MEDICINE

## 2024-01-30 PROCEDURE — 87102 FUNGUS ISOLATION CULTURE: CPT | Performed by: INTERNAL MEDICINE

## 2024-01-30 PROCEDURE — 87205 SMEAR GRAM STAIN: CPT | Performed by: INTERNAL MEDICINE

## 2024-01-30 PROCEDURE — 82945 GLUCOSE OTHER FLUID: CPT | Performed by: INTERNAL MEDICINE

## 2024-01-30 PROCEDURE — 84311 SPECTROPHOTOMETRY: CPT | Performed by: INTERNAL MEDICINE

## 2024-01-30 PROCEDURE — 83615 LACTATE (LD) (LDH) ENZYME: CPT | Performed by: INTERNAL MEDICINE

## 2024-01-30 PROCEDURE — 88108 CYTOPATH CONCENTRATE TECH: CPT | Performed by: INTERNAL MEDICINE

## 2024-01-30 PROCEDURE — 87075 CULTR BACTERIA EXCEPT BLOOD: CPT | Performed by: INTERNAL MEDICINE

## 2024-01-30 PROCEDURE — 82042 OTHER SOURCE ALBUMIN QUAN EA: CPT | Performed by: INTERNAL MEDICINE

## 2024-01-30 PROCEDURE — 71045 X-RAY EXAM CHEST 1 VIEW: CPT

## 2024-01-30 PROCEDURE — 84157 ASSAY OF PROTEIN OTHER: CPT | Performed by: INTERNAL MEDICINE

## 2024-01-30 PROCEDURE — 84478 ASSAY OF TRIGLYCERIDES: CPT | Performed by: INTERNAL MEDICINE

## 2024-01-30 PROCEDURE — 83986 ASSAY PH BODY FLUID NOS: CPT | Performed by: INTERNAL MEDICINE

## 2024-01-30 PROCEDURE — 87206 SMEAR FLUORESCENT/ACID STAI: CPT | Performed by: INTERNAL MEDICINE

## 2024-01-30 PROCEDURE — 32555 ASPIRATE PLEURA W/ IMAGING: CPT | Performed by: INTERNAL MEDICINE

## 2024-01-30 PROCEDURE — 87015 SPECIMEN INFECT AGNT CONCNTJ: CPT | Performed by: INTERNAL MEDICINE

## 2024-01-30 PROCEDURE — 87116 MYCOBACTERIA CULTURE: CPT | Performed by: INTERNAL MEDICINE

## 2024-01-30 PROCEDURE — 87070 CULTURE OTHR SPECIMN AEROBIC: CPT | Performed by: INTERNAL MEDICINE

## 2024-01-30 NOTE — PROCEDURES
Procedure name: ultrasound-guided right-sided thoracentesis     Indication - pleural effusion     This procedural risks were explained to the patient including pneumothorax requiring chest tube placement, significant bleeding requiring surgery, and infection , understanding of the risks and benefits acknowledged by the patient and family and be wish to proceed with the procedure.     Timeout performed     Procedure details  Ultrasound was use to visualize a  collection of pleural fluid, the diaphragm, and collapsed lung. At this point, the skin overlying the rib was anesthetized with 5 mL 1% lidocaine without epinephrine. A thoracentesis needle was then inserted into the pleural cavity just over top of the rib and pleural fluid was aspirated back. At this time the thoracentesis catheter was advanced to the pleural cavity over the needle.  Approximately 700 mL of cloudy yellow fluid was removed. Pleural fluid was sent for analysis. Chest x-ray has been ordered.      Patient tolerated procedure well     No immediate complications    Electronically signed by Carl Castellanos MD, 01/30/24, 12:38 PM EST.

## 2024-01-30 NOTE — PROCEDURES
Bedside thoracic ultrasound:     Right side: Liver, right hemidiaphragm, right lower lobe of lung identified.  Moderate pleural effusion identified.  Images saved     Site marked for thoracentesis.        CPT 90934 with thoracentesis note    Electronically signed by Carl Castellanos MD, 01/30/24, 12:37 PM EST.

## 2024-01-31 ENCOUNTER — TELEPHONE (OUTPATIENT)
Dept: CARDIOLOGY | Facility: CLINIC | Age: 69
End: 2024-01-31
Payer: OTHER GOVERNMENT

## 2024-01-31 LAB
ALBUMIN FLD-MCNC: 1.9 G/DL
APPEARANCE FLD: ABNORMAL
CHOLESTEROL FLUID: 33 MG/DL
COLOR FLD: YELLOW
LDH FLD-CCNC: 448 U/L
LDH FLD-CCNC: 458 U/L
LYMPHOCYTES NFR FLD MANUAL: 25 %
MACROPHAGE FLUID: 68 %
MESOTHL CELL NFR FLD MANUAL: 2 %
MONOCYTES NFR FLD: 5 %
NUC CELL # FLD: 196 /MM3
PH FLD: 8 [PH]
PROT FLD-MCNC: 2.9 G/DL
RBC # FLD AUTO: <2000 /MM3

## 2024-01-31 NOTE — TELEPHONE ENCOUNTER
SW patient's wife.  [facility] came through. Was able to get patient scheduled for 2/6/24 at 1100. Patient will need assistance with wheelchair.

## 2024-02-01 LAB
CYTO UR: NORMAL
LAB AP CASE REPORT: NORMAL
LAB AP CLINICAL INFORMATION: NORMAL
Lab: NORMAL
PATH REPORT.FINAL DX SPEC: NORMAL
PATH REPORT.GROSS SPEC: NORMAL

## 2024-02-02 LAB
BACTERIA FLD CULT: NORMAL
GRAM STN SPEC: NORMAL
GRAM STN SPEC: NORMAL

## 2024-02-04 LAB
BACTERIA SPEC ANAEROBE CULT: NORMAL
FUNGUS WND CULT: NORMAL
MYCOBACTERIUM SPEC CULT: NORMAL
NIGHT BLUE STAIN TISS: NORMAL

## 2024-02-06 ENCOUNTER — OFFICE VISIT (OUTPATIENT)
Dept: CARDIOLOGY | Facility: CLINIC | Age: 69
End: 2024-02-06
Payer: OTHER GOVERNMENT

## 2024-02-06 VITALS
OXYGEN SATURATION: 97 % | HEIGHT: 72 IN | BODY MASS INDEX: 17.74 KG/M2 | SYSTOLIC BLOOD PRESSURE: 103 MMHG | DIASTOLIC BLOOD PRESSURE: 68 MMHG | HEART RATE: 85 BPM | WEIGHT: 131 LBS

## 2024-02-06 DIAGNOSIS — R63.0 LACK OF APPETITE: ICD-10-CM

## 2024-02-06 DIAGNOSIS — R64 CACHECTIC: ICD-10-CM

## 2024-02-06 DIAGNOSIS — Z98.61 CAD S/P PERCUTANEOUS CORONARY ANGIOPLASTY: ICD-10-CM

## 2024-02-06 DIAGNOSIS — I25.10 CAD S/P PERCUTANEOUS CORONARY ANGIOPLASTY: ICD-10-CM

## 2024-02-06 DIAGNOSIS — R42 DIZZINESS: ICD-10-CM

## 2024-02-06 DIAGNOSIS — Z72.0 TOBACCO ABUSE: Primary | ICD-10-CM

## 2024-02-06 LAB
FUNGUS WND CULT: NORMAL
MYCOBACTERIUM SPEC CULT: NORMAL
NIGHT BLUE STAIN TISS: NORMAL

## 2024-02-06 RX ORDER — METOPROLOL SUCCINATE 25 MG/1
25 TABLET, EXTENDED RELEASE ORAL
Qty: 30 TABLET | Refills: 0 | Status: ON HOLD | OUTPATIENT
Start: 2024-02-06

## 2024-02-06 RX ORDER — ASPIRIN 81 MG/1
81 TABLET ORAL DAILY
Qty: 30 TABLET | Refills: 0 | Status: ON HOLD | OUTPATIENT
Start: 2024-02-06

## 2024-02-06 RX ORDER — ATORVASTATIN CALCIUM 40 MG/1
40 TABLET, FILM COATED ORAL NIGHTLY
Qty: 30 TABLET | Refills: 0 | Status: ON HOLD | OUTPATIENT
Start: 2024-02-06

## 2024-02-06 NOTE — PROGRESS NOTES
Chief Complaint  Hospital Follow Up Visit (STEMI)    Subjective        History of Present Illness  Hermelindo Zuñiga presents to [facility] for follow up.  Patient is a 68-year-old male with past medical history outlined below, significant for COPD and hypertension who presents for follow-up on recent hospitalization for STEMI where left heart catheterization revealed 80% stenosis in the left circumflex and LAD status post PCI of proximal left circumflex and mid LAD with plans for staged intervention of mid left circumflex/OM.  During his hospitalization patient was found to have a large lung mass and has since been diagnosed with stage IV lung cancer.  He denies any chest pain or discomfort.  He has chronic shortness of breath which is stable for the most part.  He has episodes of dizziness and lightheadedness anytime he stands up.  He has difficulty eating and feels as though he is going to vomit anytime he eats something.  He is cachectic/malnourished.  He denies any syncopal episodes.  He denies any edema.    Past Medical History:   Diagnosis Date    Arthritis     COPD (chronic obstructive pulmonary disease)     Hypertension        ALLERGY  No Known Allergies     Past Surgical History:   Procedure Laterality Date    CARDIAC CATHETERIZATION N/A 12/25/2023    Procedure: Left Heart Cath;  Surgeon: Jeancarlos White MD;  Location: [facility] INVASIVE LOCATION;  Service: Cardiovascular;  Laterality: N/A;    CARDIAC CATHETERIZATION N/A 12/25/2023    Procedure: Percutaneous Coronary Intervention;  Surgeon: Jeancarlos White MD;  Location: [facility] CATH INVASIVE LOCATION;  Service: Cardiovascular;  Laterality: N/A;        Social History     Socioeconomic History    Marital status:    Tobacco Use    Smoking status: Every Day     Packs/day: 0.50     Years: 50.00     Additional pack years: 0.00     Total pack years: 25.00     Types: Cigarettes     Start date: 1999    Smokeless tobacco: Never     Tobacco comments:     Smoking 1 cigarette every 2-3 days. -1--js   Vaping Use    Vaping Use: Never used   Substance and Sexual Activity    Alcohol use: Never    Drug use: Never    Sexual activity: Defer       History reviewed. No pertinent family history.     Current Outpatient Medications on File Prior to Visit   Medication Sig    acyclovir (ZOVIRAX) 400 MG tablet Take 1 tablet by mouth 5 (Five) Times a Day. Take no more than 5 doses a day.    albuterol sulfate  (90 Base) MCG/ACT inhaler Inhale 2 puffs Every 4 (Four) Hours As Needed for Wheezing or Shortness of Air for up to 30 days.    Cholecalciferol 25 MCG (1000 UT) tablet Take 2 tablets by mouth Daily.    gabapentin (NEURONTIN) 600 MG tablet Take 1.5 tablets by mouth 3 (Three) Times a Day.    HYDROcodone-acetaminophen (NORCO)  MG per tablet Take 1 tablet by mouth Every 8 (Eight) Hours As Needed for Moderate Pain.    nicotine (NICODERM CQ) 14 MG/24HR patch Place 1 patch on the skin as directed by provider Daily As Needed (nicotine withdrawal).    nitroglycerin (NITROSTAT) 0.4 MG SL tablet Place 1 tablet under the tongue Every 5 (Five) Minutes As Needed for Chest Pain. Take no more than 3 doses in 15 minutes.    ondansetron ODT (ZOFRAN-ODT) 4 MG disintegrating tablet Place 1 tablet on the tongue Every 8 (Eight) Hours As Needed for Nausea or Vomiting for up to 10 doses.    ondansetron ODT (ZOFRAN-ODT) 4 MG disintegrating tablet Place 1 tablet on the tongue Every 6 (Six) Hours As Needed for Nausea or Vomiting.    tiotropium bromide-olodaterol (Stiolto Respimat) 2.5-2.5 MCG/ACT aerosol solution inhaler Inhale 2 puffs Daily for 30 days.    [DISCONTINUED] amLODIPine (NORVASC) 10 MG tablet Take 1 tablet by mouth Daily.    [DISCONTINUED] aspirin 81 MG EC tablet Take 1 tablet by mouth Daily.    [DISCONTINUED] atorvastatin (LIPITOR) 40 MG tablet Take 1 tablet by mouth Every Night.    [DISCONTINUED] furosemide (LASIX) 40 MG tablet Take 1 tablet by  "mouth Daily.    [DISCONTINUED] lisinopril (PRINIVIL,ZESTRIL) 10 MG tablet Take 1 tablet by mouth Daily. Script is written to take one-half tablet by mouth daily for blood pressure but pt said he takes a full tab    [DISCONTINUED] metoprolol succinate XL (TOPROL-XL) 25 MG 24 hr tablet Take 1 tablet by mouth Daily.    [DISCONTINUED] ticagrelor (BRILINTA) 90 MG tablet tablet Take 1 tablet by mouth 2 (Two) Times a Day.     No current facility-administered medications on file prior to visit.       Objective   Vitals:    02/06/24 1104   BP: 103/68   BP Location: Left arm   Patient Position: Sitting   Cuff Size: Adult   Pulse: 85   SpO2: 97%   Weight: 59.4 kg (131 lb)   Height: 182.9 cm (72\")       Physical Exam  Constitutional:       General: He is awake. He is not in acute distress.     Appearance: Normal appearance. He is cachectic. He is ill-appearing.   HENT:      Head: Normocephalic.      Nose: Nose normal. No congestion.   Eyes:      Extraocular Movements: Extraocular movements intact.      Conjunctiva/sclera: Conjunctivae normal.      Pupils: Pupils are equal, round, and reactive to light.   Neck:      Thyroid: No thyromegaly.      Vascular: No JVD.   Cardiovascular:      Rate and Rhythm: Normal rate and regular rhythm.      Chest Wall: PMI is not displaced.      Pulses: Normal pulses.      Heart sounds: Normal heart sounds, S1 normal and S2 normal. No murmur heard.     No friction rub. No gallop. No S3 or S4 sounds.   Pulmonary:      Effort: Pulmonary effort is normal.      Breath sounds: Normal breath sounds. No wheezing, rhonchi or rales.   Abdominal:      General: Bowel sounds are normal.      Palpations: Abdomen is soft.      Tenderness: There is no abdominal tenderness.   Musculoskeletal:      Cervical back: No tenderness.      Right lower leg: No edema.      Left lower leg: No edema.   Lymphadenopathy:      Cervical: No cervical adenopathy.   Skin:     General: Skin is warm and dry.      Capillary Refill: " Capillary refill takes less than 2 seconds.      Coloration: Skin is not cyanotic.      Findings: No petechiae or rash.      Nails: There is no clubbing.   Neurological:      Mental Status: He is alert.   Psychiatric:         Mood and Affect: Mood normal.         Behavior: Behavior is cooperative.           Result Review     The following data was reviewed by DINA Smalls on 02/06/24.      CMP          12/29/2023    04:49 1/1/2024    13:47 1/10/2024    12:28   CMP   Glucose 116  93  87    BUN 7  5  7    Creatinine 0.65  0.62  0.54    EGFR 103.9  105.4  109.9    Sodium 127  129  120    Potassium 4.1  4.5  4.0    Chloride 90  92  85    Calcium 9.2  9.2  9.1    Total Protein  7.3  7.4    Albumin  3.7  3.5    Globulin  3.6  3.9    Total Bilirubin  0.5  0.4    Alkaline Phosphatase  94  91    AST (SGOT)  33  34    ALT (SGPT)  23  14    Albumin/Globulin Ratio  1.0  0.9    BUN/Creatinine Ratio 10.8  8.1  13.0    Anion Gap 10.0  11.7  14.6      CBC w/diff          12/27/2023    06:32 1/1/2024    13:47 1/10/2024    12:28   CBC w/Diff   WBC 5.75  4.95  5.96    RBC 4.21  4.04  3.78    Hemoglobin 13.3  12.8  12.0    Hematocrit 39.0  37.7  34.8    MCV 92.6  93.3  92.1    MCH 31.6  31.7  31.7    MCHC 34.1  34.0  34.5    RDW 13.2  13.4  13.3    Platelets 331  351  377    Neutrophil Rel %  66.3  78.3    Immature Granulocyte Rel %  0.4  0.5    Lymphocyte Rel %  12.3  8.7    Monocyte Rel %  16.4  10.9    Eosinophil Rel %  3.8  1.3    Basophil Rel %  0.8  0.3       Lipid Panel          12/25/2023    19:27   Lipid Panel   Total Cholesterol 116    Triglycerides 68    HDL Cholesterol 32    VLDL Cholesterol 14    LDL Cholesterol  70    LDL/HDL Ratio 2.20        Results for orders placed during the hospital encounter of 12/25/23    Adult Transthoracic Echo Complete W/ Cont if Necessary Per Protocol    Interpretation Summary    Left ventricular ejection fraction appears to be 51 - 55%.    Left ventricular diastolic function is  consistent with (grade I) impaired relaxation and age.    Estimated right ventricular systolic pressure from tricuspid regurgitation is moderately elevated (45-55 mmHg).    Mild to moderate tricuspid regurgitation.                  Procedures    Assessment & Plan  Diagnoses and all orders for this visit:    1. Tobacco abuse (Primary)    2. CAD S/P percutaneous coronary angioplasty    3. Dizziness    4. Lack of appetite  -     Ambulatory Referral to Gastroenterology    5. Cachectic    Other orders  -     metoprolol succinate XL (TOPROL-XL) 25 MG 24 hr tablet; Take 1 tablet by mouth Daily.  Dispense: 30 tablet; Refill: 0  -     atorvastatin (LIPITOR) 40 MG tablet; Take 1 tablet by mouth Every Night.  Dispense: 30 tablet; Refill: 0  -     ticagrelor (BRILINTA) 90 MG tablet tablet; Take 1 tablet by mouth 2 (Two) Times a Day.  Dispense: 60 tablet; Refill: 0  -     aspirin 81 MG EC tablet; Take 1 tablet by mouth Daily.  Dispense: 30 tablet; Refill: 0        1.  Patient has quit smoking.  Continued cessation was encouraged.  2.  Status postSTEMI where left heart catheterization revealed 80% stenosis in the left circumflex and LAD status post PCI of proximal left circumflex and mid LAD with plans for staged intervention of mid left circumflex/OM.  He notes no angina or anginal-like symptoms.  Continue the aspirin and Brilinta.  Pulmonary evidently wants to schedule the patient for a lung biopsy however they were told that patient cannot hold his Brilinta for 3 months postprocedure.  With his upcoming staged PCI will discuss with Dr. Barrera whether patient would benefit from going ahead with this prior to lung biopsy or if it can wait till after his lung biopsy.  3.  Patient does note episodes of orthostatic dizziness.  Blood pressure is on the low side and he is no longer taking his amlodipine, lisinopril, Lasix, metoprolol.  He was advised to continue to hold these with the exception of the metoprolol.  Patient appears  malnourished.  He has difficulty eating.  Feel this can be contributing to his dizziness.  3.  Patient will be referred to GI as he is not tolerating food and states that he vomits anytime that he eats.  4.  Referral to GI will be placed.    The medical services provided during this encounter are part of ongoing care related to this patient's single serious condition or complex condition.    Follow Up   Return in about 3 months (around 5/6/2024) for With Dr. Barrera.    Patient was given instructions and counseling regarding his condition or for health maintenance advice. Please see specific information pulled into the AVS if appropriate.     Toyin Robins, APRN  02/06/24  11:11 EST    Dictated Utilizing Dragon Dictation

## 2024-02-09 PROBLEM — R09.02 HYPOXIA: Status: ACTIVE | Noted: 2024-01-01

## 2024-02-09 LAB — H CAPSUL AG SER QL IA: NORMAL

## 2024-02-09 NOTE — CONSULTS
Pulmonary / Critical Care Consult Note      Patient Name: Hermelindo Zuñiga  : 1955  MRN: [id number]  Primary Care Physician:  Antony Florence MD  Referring Physician: Vamsi Sam MD  Date of admission: 2024    Subjective   Subjective     Reason for Consult/ Chief Complaint:   Pleural effusion, lung mass, shortness of breath    HPI:  Hermelindo Zuñiga is a 68 y.o. male  with past medical history of CAD with PCI in , lung mass that is yet to be biopsied, COPD on as needed O2, tobacco abuse of cigarettes, diastolic heart failure and hypertension who presented to the emergency department with worsening shortness of breath.  In the emergency department, CT chest revealed advanced bronchogenic malignancy or lung cancer with superior vena cava surrounded by bulky adenopathy and is markedly narrowed by, moderate right pleural effusion appears slightly larger with concern for postobstructive pneumonia.  Labs of note include, lactate 2.5, Pro-Ronaldo 12.17, sodium 128, hemoglobin 10.5 and ABG 7.45/.  Patient was admitted to hospitalist service for acute hypoxic respiratory failure, postobstructive pneumonia, right-sided pleural effusion. Pulmonology was then consulted for the above reason.     Upon exam, patient was noted to be resting comfortably in stretcher in ED on 9 L HFNC.  Patient reports he has had worsening shortness of breath.  He recently was diagnosed with lung mass that has yet to undergo biopsy.  Patient has not started any treatments.  Of note, patient was seen on 2024 and underwent outpatient ultrasound-guided thoracentesis for right-sided pleural effusion with 700 mL pleural fluid drained.  Cytology and cultures are negative.    Review of Systems  Constitutional symptoms: Fatigue, otherwise denied complaints   Ear, nose, throat: Denied complaints  Cardiovascular:  Denied complaints  Respiratory: Dyspnea, cough, otherwise denied complaints  Gastrointestinal: Denied  complaints  Musculoskeletal: Denied complaints  Genitourinary: Denied complaints  Allergy / Immunology: Denied complaints  Hematologic: Denied complaints  Neurologic: Denied complaints  Skin: Denied complaints  Endocrine: Denied complaints  Psychiatric: Denied complaints      Personal History     Past Medical History:   Diagnosis Date    Arthritis     COPD (chronic obstructive pulmonary disease)     Heart attack     Hypertension        Past Surgical History:   Procedure Laterality Date    CARDIAC CATHETERIZATION N/A 12/25/2023    Procedure: Left Heart Cath;  Surgeon: Jeancarlos White MD;  Location: [facility] CATH INVASIVE LOCATION;  Service: Cardiovascular;  Laterality: N/A;    CARDIAC CATHETERIZATION N/A 12/25/2023    Procedure: Percutaneous Coronary Intervention;  Surgeon: Jeancarlos White MD;  Location: [facility] CATH INVASIVE LOCATION;  Service: Cardiovascular;  Laterality: N/A;       Family History: family history is not on file. Otherwise pertinent FHx was reviewed and not pertinent to current issue.    Social History:  reports that he has been smoking cigarettes. He started smoking about 25 years ago. He has a 25.00 pack-year smoking history. He has never used smokeless tobacco. He reports that he does not drink alcohol and does not use drugs.    Home Medications:  HYDROcodone-acetaminophen, albuterol sulfate HFA, aspirin, atorvastatin, cholecalciferol, gabapentin, hydrOXYzine, metoprolol succinate XL, nitroglycerin, ticagrelor, and tiotropium bromide-olodaterol    Allergies:  No Known Allergies    Objective    Objective     Vitals:   Temp:  [97 °F (36.1 °C)] 97 °F (36.1 °C)  Heart Rate:  [56-81] 81  Resp:  [18-24] 24  BP: ()/(58-90) 132/69  Flow (L/min):  [9-15] 9    Physical Exam:  Vital Signs Reviewed   General: Chronically ill appearing, elderly male, Alert, mild distress, sitting up in bed.    Lymph: no axillary, cervical, supraclavicular lymphadenopathy noted bilaterally  Chest: Decreased aeration R>L,  rhonchi to auscultation bilaterally, increased work of breathing noted on 9 L HFNC, pursed lip breathing noted  CV: RRR, no MGR, pulses 2+, equal.  Abd:  Soft, NT, ND, + BS, no HSM  EXT:  no clubbing, no cyanosis, no edema, no joint tenderness  Neuro:  A&Ox3, CN grossly intact, no focal deficits.  Skin: No rashes or lesions noted      Result Review    Result Review:  I have personally reviewed the results from the time of this admission to 2/9/2024 17:24 EST and agree with these findings:  [x]  Laboratory  [x]  Microbiology  [x]  Radiology  [x]  EKG/Telemetry   []  Cardiology/Vascular   []  Pathology  []  Old records  []  Other:    Most notable findings include:   Lactate 2.5  Procalcitonin 12.17      Lab 02/09/24  1733 02/09/24  1251   WBC  --  7.03   HEMOGLOBIN  --  10.5*   HEMATOCRIT  --  31.1*   PLATELETS  --  353   SODIUM  --  128*   SODIUM, ARTERIAL  --  128.7*   POTASSIUM  --  3.7   CHLORIDE  --  94*   CO2  --  23.5   BUN  --  9   CREATININE  --  0.43*   GLUCOSE  --  91   GLUCOSE, ARTERIAL  --  85   CALCIUM  --  8.1*   TOTAL PROTEIN 6.5 6.2   ALBUMIN  --  3.1*   GLOBULIN  --  3.1     CT Chest With Contrast Diagnostic    Result Date: 2/9/2024  PROCEDURE: CT CHEST W CONTRAST DIAGNOSTIC  COMPARISON:  [facility], PET, NM PET/CT SKULL BASE TO MID THIGH, 1/26/2024, 14:44.  [facility], CR, XR CHEST 1 VW, 1/30/2024, 13:14.  [facility], CR, XR CHEST 1 VW, 2/09/2024, 13:19.  [facility], CT, CT ANGIOGRAM ABDOMEN PELVIS, 1/10/2024, 12:57.  INDICATIONS: shortness of breath, worsening lung cancer  TECHNIQUE: CT images were obtained with non-ionic intravenous contrast material.   PROTOCOL:   Pulmonary embolism imaging protocol performed    RADIATION:   DLP: 409.6 mGy*cm   Automated exposure control was utilized to minimize radiation dose. CONTRAST: 100 cc Isovue 370 I.V. LABS:   eGFR: >60 ml/min/1.73m2  FINDINGS:  The pulmonary arteries are well opacified.  No filling  defects are seen.  There are no findings of PE.  The main pulmonary artery is of similar diameter to the ascending aorta, consistent with pulmonary artery hypertension.  Mediastinal windows reveal 9 cm mass in the right upper lobe with bulky hilar and confluent mediastinal adenopathy consistent with bronchogenic malignancy or lung cancer.  This was hypermetabolic on PET scan.  Cervical, mediastinal, and upper abdominal adenopathy is again seen, which was also hypermetabolic on PET scan.  The superior vena cava is surrounded by bulky adenopathy, and is markedly narrowed.  Extensive coronary artery calcifications are evident.  Moderate right pleural effusion appears slightly larger.  Lung window images reveal marked emphysema.  No airspace disease or mass is seen on the left.  Heterogeneous hepatic density is consistent with hypermetabolic metastatic lesions seen on CT scan.  Numerous hypermetabolic ICS lesions were seen on PET scan, no destructive bone lesion is evident.  CONTINUED ON NEXT PAGE...        Impression:   CT scan of the chest with IV contrast demonstrating no findings of PE.  Findings consistent with advanced bronchogenic malignancy or lung cancer are again evident.  The superior vena cava is surrounded by bulky adenopathy, and is markedly narrowed.      AJ TRAN MD       Electronically Signed and Approved By: AJ TRAN MD on 2/09/2024 at 15:49                Assessment & Plan   Assessment / Plan     Active Hospital Problems:  There are no active hospital problems to display for this patient.    Impression:  Acute on chronic hypoxic respiratory failure, wears O2 as needed  Right-sided pleural effusion  9 cm right upper lobe mass with bulky hilar /confluent mediastinal adenopathy  Chronic obstructive pulmonary disease  Current tobacco abuse of cigarettes  Hyponatremia  CAD with stent stent placement 12/23    Plan:  -Continue to wean O2 to maintain SpO2 greater than 90%  -CT chest reviewed  revealing large right upper lobe mass with hilar and mediastinal adenopathy concerning for malignancy, moderate right pleural effusion, concern for postobstructive pneumonia  -Bedside ultrasound performed revealing large right pleural effusion  -Will perform ultrasound-guided thoracentesis of right pleural effusion. I have discussed the risks of the procedure with the patient including pneumothorax, hemothorax, bleeding, hypoxia and death. The patient recognizes these findings, acknowledges these findings and is agreeable to the procedure.  -1300 mL of serosanguineous pleural fluid drained, sent for cultures and cytology  -Postprocedure CXR without pneumothorax  -Continue vancomycin and Zosyn for pneumonia and sepsis, may de-escalate pending cultures  -Procalcitonin 12.17, recheck in a.m.  -Check S pneumo, Legionella, mycoplasma, MRSA PCR, blood and sputum culture  -Continue Brovana, Pulmicort and DuoNebs  -Start bronchopulmonary hygiene.  Encourage use of I-S and flutter valve.  -Trend lactate until cleared  -Continue to monitor renal function and electrolytes.  -Patient has a large right-sided lung mass without formal diagnoses.  He will likely need EBUS bronchoscopy versus Ion navigational bronchoscopy for this mass.  However patient is currently on aspirin and Brilinta with recent stent placement.  Recommend consulting cardiology to see when it is appropriate to hold these medications for the procedure.  Will continue for now.  -Encourage mobilization.  Out of bed to chair.      DVT prophylaxis:  No DVT prophylaxis order currently exists.         There are no questions and answers to display.      Electronically signed by DINA Jorge, 02/09/24, 6:32 PM EST.  This patient was seen by both a physician and a NP. I, Chaitanya Dsouza MD, spent >50% of time in accordance with split shared billing. This included personally reviewing all pertinent labs, imaging, microbiology and documentation. Also discussing the  case with the patient and any available family, the admitting physician and any available ancillary staff.   Electronically signed by Chaitanya Dsouza MD, 02/15/24, 3:53 PM EST.

## 2024-02-09 NOTE — PROCEDURES
Procedure:Thoracentesis     Indication: Large sided pleural effusion    Consent: Yes, placed in chart.    Procedure: The patient was placed in the sitting position and the appropriate landmarks were identified. The skin over the puncture site in the right posterior chest wall was prepped with ChloraPrep and draped in sterile fashion. Local anesthesia was applied with 1% lidocaine. Thoracentesis catheter was inserted with needle guidance. Pleural fluid was serosanguineous, drained 1300 mL fluid. The catheter was then withdrawn and a sterile dressing was placed over the site. A post-procedure film is pending at this time.    The patient tolerated the procedure well.    Complications: None    Electronically signed by Chaitanya Dsouza MD, 2/9/2024, 17:23 EST.

## 2024-02-09 NOTE — PROCEDURES
Procedure: Bedside thoracic ultrasound:    Left showed B lines, curtain sign seen.    Right side showed moderate to large effusion with anechoic space between lung and diaphragm.     Site marked for thoracentesis.    Images stored online.     Electronically signed by Chaitanya Dsouza MD, 2/9/2024, 17:23 EST.

## 2024-02-09 NOTE — LETTER
February 12, 2024     Patient: Hermelindo Zuñiga   YOB: 1955   Date of Visit: 2/9/2024       To Whom It May Concern:    Hermelindo Zuñiga is currently admitted into the hospital and is alert and oriented to make his own medical decisions and sign any legal paperwork.         Sincerely,      MARLON KoN, RN, [facility]  Palliative Care Nurse  [phone]

## 2024-02-09 NOTE — ED NOTES
"Wife reported to EMS pt is not eating, pt has bilateral upper facial swelling around eyes bilaterally and he reports it has been this way for \"a couple months\" pt states the swelling comes and goes.   "

## 2024-02-09 NOTE — ED PROVIDER NOTES
Time: 1:26 PM EST  Date of encounter:  2/9/2024  Independent Historian/Clinical History and Information was obtained by:   Patient    History is limited by:  Vague historian/acuity of condition    Chief Complaint: Shortness of air, generally feeling poorly      History of Present Illness:  Patient is a 68 y.o. year old male who presents to the emergency department for evaluation of shortness of air and sense of being ill/general bad feeling.  Patient denies chest pain, abdominal pain, vomiting.  Complains of loose stools but no blood in the stool today.  No known fever.  Patient has been worsening for the past 1 week.  History otherwise limited.    [facility]    Patient Care Team  Primary Care Provider: Antony Florence MD    Past Medical History:     No Known Allergies  Past Medical History:   Diagnosis Date    Arthritis     COPD (chronic obstructive pulmonary disease)     Heart attack     Hypertension      Past Surgical History:   Procedure Laterality Date    CARDIAC CATHETERIZATION N/A 12/25/2023    Procedure: Left Heart Cath;  Surgeon: Jeancarlos White MD;  Location: [facility] CATH INVASIVE LOCATION;  Service: Cardiovascular;  Laterality: N/A;    CARDIAC CATHETERIZATION N/A 12/25/2023    Procedure: Percutaneous Coronary Intervention;  Surgeon: Jeancarlos White MD;  Location: [facility] CATH INVASIVE LOCATION;  Service: Cardiovascular;  Laterality: N/A;     History reviewed. No pertinent family history.    Home Medications:  Prior to Admission medications    Medication Sig Start Date End Date Taking? Authorizing Provider   acyclovir (ZOVIRAX) 400 MG tablet Take 1 tablet by mouth 5 (Five) Times a Day. Take no more than 5 doses a day. 8/12/23   Dennis Woods MD   albuterol sulfate  (90 Base) MCG/ACT inhaler Inhale 2 puffs Every 4 (Four) Hours As Needed for Wheezing or Shortness of Air for up to 30 days. 1/22/24 2/21/24  Adriana Choudhary APRN   aspirin 81 MG EC tablet Take 1 tablet by mouth Daily. 2/6/24   Toyin Robins  DINA Palacios   atorvastatin (LIPITOR) 40 MG tablet Take 1 tablet by mouth Every Night. 2/6/24   Toyin Robins APRN   Cholecalciferol 25 MCG (1000 UT) tablet Take 2 tablets by mouth Daily.    ProviderPricila MD   gabapentin (NEURONTIN) 600 MG tablet Take 1.5 tablets by mouth 3 (Three) Times a Day.    Pricila Hicks MD   HYDROcodone-acetaminophen (NORCO)  MG per tablet Take 1 tablet by mouth Every 8 (Eight) Hours As Needed for Moderate Pain.    Pricila Hicks MD   metoprolol succinate XL (TOPROL-XL) 25 MG 24 hr tablet Take 1 tablet by mouth Daily. 2/6/24   Toyin Robins APRN   nicotine (NICODERM CQ) 14 MG/24HR patch Place 1 patch on the skin as directed by provider Daily As Needed (nicotine withdrawal). 12/28/23   Steve Kendall MD   nitroglycerin (NITROSTAT) 0.4 MG SL tablet Place 1 tablet under the tongue Every 5 (Five) Minutes As Needed for Chest Pain. Take no more than 3 doses in 15 minutes. 12/28/23   Steve Kendall MD   ondansetron ODT (ZOFRAN-ODT) 4 MG disintegrating tablet Place 1 tablet on the tongue Every 8 (Eight) Hours As Needed for Nausea or Vomiting for up to 10 doses. 11/27/23   Sandi Iverson MD   ondansetron ODT (ZOFRAN-ODT) 4 MG disintegrating tablet Place 1 tablet on the tongue Every 6 (Six) Hours As Needed for Nausea or Vomiting. 1/10/24   Ed Kuhn DO   ticagrelor (BRILINTA) 90 MG tablet tablet Take 1 tablet by mouth 2 (Two) Times a Day. 2/6/24   Toyin Robins APRN   tiotropium bromide-olodaterol (Stiolto Respimat) 2.5-2.5 MCG/ACT aerosol solution inhaler Inhale 2 puffs Daily for 30 days. 1/22/24 2/21/24  Adriana Choudhary APRN        Social History:   Social History     Tobacco Use    Smoking status: Every Day     Packs/day: 0.50     Years: 50.00     Additional pack years: 0.00     Total pack years: 25.00     Types: Cigarettes     Start date: 1999    Smokeless tobacco: Never    Tobacco comments:     Smoking  "1 cigarette every 2-3 days. -1--js   Vaping Use    Vaping Use: Never used   Substance Use Topics    Alcohol use: Never    Drug use: Never         Review of Systems:  Review of Systems   Constitutional:  Negative for chills and fever.   HENT:  Negative for congestion, rhinorrhea and sore throat.    Eyes:  Negative for photophobia.   Respiratory:  Positive for cough and shortness of breath. Negative for apnea and chest tightness.    Cardiovascular:  Negative for chest pain and palpitations.   Gastrointestinal:  Negative for abdominal pain, diarrhea, nausea and vomiting.   Endocrine: Negative.    Genitourinary:  Negative for difficulty urinating and dysuria.   Musculoskeletal:  Negative for back pain, joint swelling and myalgias.   Skin:  Negative for color change and wound.   Allergic/Immunologic: Negative.    Neurological:  Negative for seizures and headaches.   Psychiatric/Behavioral: Negative.     All other systems reviewed and are negative.       Physical Exam:  /69 (BP Location: Left arm)   Pulse 81   Temp 97 °F (36.1 °C) (Oral)   Resp 24   Ht 182.9 cm (72\")   Wt 54.7 kg (120 lb 9.5 oz)   SpO2 90%   BMI 16.36 kg/m²     Physical Exam  Vitals and nursing note reviewed.   Constitutional:       General: He is awake.      Appearance: Normal appearance.   HENT:      Head: Normocephalic and atraumatic.      Nose: Nose normal.      Mouth/Throat:      Mouth: Mucous membranes are moist.   Eyes:      Extraocular Movements: Extraocular movements intact.      Pupils: Pupils are equal, round, and reactive to light.   Cardiovascular:      Rate and Rhythm: Normal rate and regular rhythm.      Heart sounds: Normal heart sounds.   Pulmonary:      Effort: Pulmonary effort is normal. No respiratory distress.      Breath sounds: Normal breath sounds. No wheezing, rhonchi or rales.   Abdominal:      General: Bowel sounds are normal.      Palpations: Abdomen is soft.      Tenderness: There is no abdominal " tenderness. There is no guarding or rebound.      Comments: No rigidity   Musculoskeletal:         General: No tenderness. Normal range of motion.      Cervical back: Normal range of motion and neck supple.   Skin:     General: Skin is warm and dry.      Coloration: Skin is not jaundiced.   Neurological:      General: No focal deficit present.      Mental Status: He is alert. Mental status is at baseline.   Psychiatric:         Mood and Affect: Mood normal.                  Procedures:  Procedures      Medical Decision Making:      Comorbidities that affect care:    Hypertension, COPD, CAD, lung cancer    External Notes reviewed:    Previous Clinic Note: Office visit 2/6/2024 with cardiology.  Description: Tobacco use, CAD      The following orders were placed and all results were independently analyzed by me:  Orders Placed This Encounter   Procedures    Thoracentesis at Bedside    Blood Culture - Blood,    COVID-19, FLU A/B, RSV PCR 1 HR TAT - Swab, Nasopharynx    Body Fluid Culture - Body Fluid, Pleural Cavity    Anaerobic Culture - Pleural Fluid, Pleural Cavity    Fungus Culture - Body Fluid, Lung, R    ROSALINDA Prep - Body Fluid, Pleural Cavity    Fungus Smear - Body Fluid, Pleural Cavity    S. Pneumo Ag Urine or CSF - Urine, Urine, Clean Catch    Legionella Antigen, Urine - Urine, Urine, Clean Catch    Respiratory Culture - Sputum, Cough    XR Chest 1 View    CT Chest With Contrast Diagnostic    XR Chest 1 View    [place] Draw    Comprehensive Metabolic Panel    BNP    Single High Sensitivity Troponin T    CBC Auto Differential    ABG with Co-Ox and Electrolytes    Lactic Acid, Plasma    STAT Lactic Acid, Reflex    Body Fluid Cell Count With Differential - Body Fluid, Pleural Cavity    pH, Body Fluid - Body Fluid, Pleural Cavity    Albumin, Fluid - Pleural Fluid, Pleural Cavity    Protein, Body Fluid - Pleural Fluid, Pleural Cavity    Lactate Dehydrogenase, Body Fluid - Body Fluid, Pleural Cavity    Glucose, Body  Fluid - Pleural Fluid, Pleural Cavity    Triglycerides, Body Fluid - Pleural Fluid, Pleural Cavity    Cholesterol, Body Fluid - Pleural Fluid, Pleural Cavity    Lactate Dehydrogenase    Protein, Total    Body fluid cell count - Body Fluid, Pleural Cavity    CBC Auto Differential    Comprehensive Metabolic Panel    Magnesium    Procalcitonin    Procalcitonin    NPO Diet NPO Type: Strict NPO    Undress & Gown    Continuous Pulse Oximetry    Vital Signs    Verify Informed Consent    Vital Signs    Intake & Output    Weigh Patient    Oral Care    Telemetry - Maintain IV Access    Telemetry - Place Orders & Notify Provider of Results When Patient Experiences Acute Chest Pain, Dysrhythmia or Respiratory Distress    Daily Weights    Strict Intake & Output    Code Status and Medical Interventions:    Hospitalist (on-call MD unless specified)    IP General Consult (Use specialty-specific consult if known)    Oxygen Therapy- Nasal Cannula; Titrate 1-6 LPM Per SpO2; 90 - 95%    ECG 12 Lead ED Triage Standing Order; SOA    Insert Peripheral IV    Insert Peripheral IV    Inpatient Admission    CBC & Differential    Green Top (Gel)    Lavender Top    Gold Top - SST    Light Blue Top       Medications Given in the Emergency Department:  Medications   sodium chloride 0.9 % flush 10 mL (has no administration in time range)   sodium chloride 0.9 % bolus 1,000 mL (has no administration in time range)   Hold medication (has no administration in time range)   sodium chloride 0.9 % flush 10 mL (has no administration in time range)   sodium chloride 0.9 % flush 10 mL (has no administration in time range)   sodium chloride 0.9 % infusion 40 mL (has no administration in time range)   Enoxaparin Sodium (LOVENOX) syringe 40 mg (has no administration in time range)   acetaminophen (TYLENOL) tablet 650 mg (has no administration in time range)   HYDROcodone-acetaminophen (NORCO) 5-325 MG per tablet 1 tablet (has no administration in time range)    morphine injection 4 mg (has no administration in time range)     And   naloxone (NARCAN) injection 0.4 mg (has no administration in time range)   Pharmacy to Dose Zosyn (has no administration in time range)   Pharmacy to dose vancomycin (has no administration in time range)   budesonide (PULMICORT) nebulizer solution 0.5 mg (has no administration in time range)   arformoterol (BROVANA) nebulizer solution 15 mcg (has no administration in time range)   ipratropium-albuterol (DUO-NEB) nebulizer solution 3 mL (has no administration in time range)   sodium chloride 0.9 % bolus 1,000 mL (0 mL Intravenous Stopped 2/9/24 1541)   cefepime (MAXIPIME) 2000 mg/100 mL 0.9% NS (mbp) (0 mg Intravenous Stopped 2/9/24 1737)   iopamidol (ISOVUE-370) 76 % injection 100 mL (100 mL Intravenous Given 2/9/24 1523)        ED Course:    ED Course as of 02/09/24 1738   Fri Feb 09, 2024   1319 I have personally interpreted the EKG today and it shows no evidence of any acute ischemia or heart arrhythmia. [RP]      ED Course User Index  [RP] Jose Medina MD       Labs:    Lab Results (last 24 hours)       Procedure Component Value Units Date/Time    CBC & Differential [[id number]]  (Abnormal) Collected: 02/09/24 1251    Specimen: Blood Updated: 02/09/24 1300    Narrative:      The following orders were created for panel order CBC & Differential.  Procedure                               Abnormality         Status                     ---------                               -----------         ------                     CBC Auto Differential[[id number]]        Abnormal            Final result                 Please view results for these tests on the individual orders.    Comprehensive Metabolic Panel [[id number]]  (Abnormal) Collected: 02/09/24 1251    Specimen: Blood Updated: 02/09/24 1324     Glucose 91 mg/dL      BUN 9 mg/dL      Creatinine 0.43 mg/dL      Sodium 128 mmol/L      Potassium 3.7 mmol/L      Chloride 94 mmol/L      CO2 23.5  mmol/L      Calcium 8.1 mg/dL      Total Protein 6.2 g/dL      Albumin 3.1 g/dL      ALT (SGPT) 10 U/L      AST (SGOT) 26 U/L      Alkaline Phosphatase 114 U/L      Total Bilirubin 0.6 mg/dL      Globulin 3.1 gm/dL      A/G Ratio 1.0 g/dL      BUN/Creatinine Ratio 20.9     Anion Gap 10.5 mmol/L      eGFR 117.7 mL/min/1.73     Narrative:      GFR Normal >60  Chronic Kidney Disease <60  Kidney Failure <15      BNP [[id number]]  (Normal) Collected: 02/09/24 1251    Specimen: Blood Updated: 02/09/24 1322     proBNP 446.2 pg/mL     Narrative:      This assay is used as an aid in the diagnosis of individuals suspected of having heart failure. It can be used as an aid in the diagnosis of acute decompensated heart failure (ADHF) in patients presenting with signs and symptoms of ADHF to the emergency department (ED). In addition, NT-proBNP of <300 pg/mL indicates ADHF is not likely.    Age Range Result Interpretation  NT-proBNP Concentration (pg/mL:      <50             Positive            >450                   Gray                 300-450                    Negative             <300    50-75           Positive            >900                  Gray                300-900                  Negative            <300      >75             Positive            >1800                  Gray                300-1800                  Negative            <300    Single High Sensitivity Troponin T [[id number]]  (Normal) Collected: 02/09/24 1251    Specimen: Blood Updated: 02/09/24 1324     HS Troponin T 21 ng/L     Narrative:      High Sensitive Troponin T Reference Range:  <14.0 ng/L- Negative Female for AMI  <22.0 ng/L- Negative Male for AMI  >=14 - Abnormal Female indicating possible myocardial injury.  >=22 - Abnormal Male indicating possible myocardial injury.   Clinicians would have to utilize clinical acumen, EKG, Troponin, and serial changes to determine if it is an Acute Myocardial Infarction or myocardial injury due to an  underlying chronic condition.         CBC Auto Differential [[id number]]  (Abnormal) Collected: 02/09/24 1251    Specimen: Blood Updated: 02/09/24 1300     WBC 7.03 10*3/mm3      RBC 3.31 10*6/mm3      Hemoglobin 10.5 g/dL      Hematocrit 31.1 %      MCV 94.0 fL      MCH 31.7 pg      MCHC 33.8 g/dL      RDW 14.9 %      RDW-SD 51.5 fl      MPV 8.0 fL      Platelets 353 10*3/mm3      Neutrophil % 85.3 %      Lymphocyte % 4.6 %      Monocyte % 9.1 %      Eosinophil % 0.3 %      Basophil % 0.3 %      Immature Grans % 0.4 %      Neutrophils, Absolute 6.00 10*3/mm3      Lymphocytes, Absolute 0.32 10*3/mm3      Monocytes, Absolute 0.64 10*3/mm3      Eosinophils, Absolute 0.02 10*3/mm3      Basophils, Absolute 0.02 10*3/mm3      Immature Grans, Absolute 0.03 10*3/mm3      nRBC 0.0 /100 WBC     ABG with Co-Ox and Electrolytes [[id number]]  (Abnormal) Collected: 02/09/24 1251    Specimen: Arterial Blood from Arm, Left Updated: 02/09/24 1302     pH, Arterial 7.458 pH units      pCO2, Arterial 31.4 mm Hg      pO2, Arterial 45.7 mm Hg      HCO3, Arterial 21.7 mmol/L      Base Excess, Arterial -1.4 mmol/L      O2 Saturation, Arterial 80.6 %      Hemoglobin, Blood Gas 11.1 g/dL      Carboxyhemoglobin 1.0 %      Methemoglobin 0.20 %      Oxyhemoglobin 79.6 %      FHHB 19.2 %      Santy's Test N/A     Site Arterial: left brachial     Modality Nasal Cannula     FIO2 40 %      Flow Rate 5 lpm      Sodium, Arterial 128.7 mmol/L      Potassium, Arterial 3.22 mmol/L      Ionized Calcium, Arterial 1.03 mmol/L      Chloride, Arterial 99 mmol/L      Glucose, Arterial 85 mg/dL      Lactate, Arterial 1.86 mmol/L      PO2/FIO2 114    Procalcitonin [[id number]] Collected: 02/09/24 1251    Specimen: Blood Updated: 02/09/24 1726    Blood Culture - Blood, Arm, Left [[id number]] Collected: 02/09/24 1254    Specimen: Blood from Arm, Left Updated: 02/09/24 1257    Lactic Acid, Plasma [[id number]]  (Abnormal) Collected: 02/09/24 1254    Specimen: Blood  Updated: 02/09/24 1332     Lactate 2.5 mmol/L     COVID-19, FLU A/B, RSV PCR 1 HR TAT - Swab, Nasopharynx [[id number]]  (Normal) Collected: 02/09/24 1334    Specimen: Swab from Nasopharynx Updated: 02/09/24 1424     COVID19 Not Detected     Influenza A PCR Not Detected     Influenza B PCR Not Detected     RSV, PCR Not Detected    Narrative:      Fact sheet for providers: https://www.fda.gov/media/036466/download    Fact sheet for patients: https://www.fda.gov/media/354045/download    Test performed by PCR.             Imaging:    CT Chest With Contrast Diagnostic    Result Date: 2/9/2024  PROCEDURE: CT CHEST W CONTRAST DIAGNOSTIC  COMPARISON:  [facility], PET, NM PET/CT SKULL BASE TO MID THIGH, 1/26/2024, 14:44.  [facility], CR, XR CHEST 1 VW, 1/30/2024, 13:14.  [facility], CR, XR CHEST 1 VW, 2/09/2024, 13:19.  [facility], CT, CT ANGIOGRAM ABDOMEN PELVIS, 1/10/2024, 12:57.  INDICATIONS: shortness of breath, worsening lung cancer  TECHNIQUE: CT images were obtained with non-ionic intravenous contrast material.   PROTOCOL:   Pulmonary embolism imaging protocol performed    RADIATION:   DLP: 409.6 mGy*cm   Automated exposure control was utilized to minimize radiation dose. CONTRAST: 100 cc Isovue 370 I.V. LABS:   eGFR: >60 ml/min/1.73m2  FINDINGS:  The pulmonary arteries are well opacified.  No filling defects are seen.  There are no findings of PE.  The main pulmonary artery is of similar diameter to the ascending aorta, consistent with pulmonary artery hypertension.  Mediastinal windows reveal 9 cm mass in the right upper lobe with bulky hilar and confluent mediastinal adenopathy consistent with bronchogenic malignancy or lung cancer.  This was hypermetabolic on PET scan.  Cervical, mediastinal, and upper abdominal adenopathy is again seen, which was also hypermetabolic on PET scan.  The superior vena cava is surrounded by bulky adenopathy, and is markedly  narrowed.  Extensive coronary artery calcifications are evident.  Moderate right pleural effusion appears slightly larger.  Lung window images reveal marked emphysema.  No airspace disease or mass is seen on the left.  Heterogeneous hepatic density is consistent with hypermetabolic metastatic lesions seen on CT scan.  Numerous hypermetabolic ICS lesions were seen on PET scan, no destructive bone lesion is evident.  CONTINUED ON NEXT PAGE...          CT scan of the chest with IV contrast demonstrating no findings of PE.  Findings consistent with advanced bronchogenic malignancy or lung cancer are again evident.  The superior vena cava is surrounded by bulky adenopathy, and is markedly narrowed.      AJ TRAN MD       Electronically Signed and Approved By: AJ TRAN MD on 2/09/2024 at 15:49             XR Chest 1 View    Result Date: 2/9/2024  PROCEDURE: XR CHEST 1 VW  COMPARISON: [facility], CR, XR CHEST 1 VW, 1/30/2024, 13:14.  INDICATIONS: SOA Triage Protocol/SHORTNESS OF BREATH AND DIFFICULTY BREATHING  FINDINGS:  The cardiac silhouette appears unchanged.  There is aortic arch atherosclerotic calcification.  There is right upper lobe consolidation and likely right suprahilar adenopathy which appears similar to the prior examination.  There is worsening right-sided pleural effusion and increasing right basilar airspace disease.  The left lung remains hyperinflated with underlying findings of COPD/emphysema.        1. Increasing small right-sided pleural effusion and worsening right basilar airspace disease compared to the prior exam. 2. Unchanged right upper lobe consolidation and likely right suprahilar adenopathy. 3. Suspected findings of COPD/emphysema.        RODRIGUEZ MELENDEZ MD       Electronically Signed and Approved By: RODRIGUEZ MELENDEZ MD on 2/09/2024 at 13:32                Differential Diagnosis and Discussion:    Dyspnea: Differential diagnosis includes but is not limited to  metabolic acidosis, neurological disorders, psychogenic, asthma, pneumothorax, upper airway obstruction, COPD, pneumonia, noncardiogenic pulmonary edema, interstitial lung disease, anemia, congestive heart failure, and pulmonary embolism    All labs were reviewed and interpreted by me.  All X-rays impressions were independently interpreted by me.  EKG was interpreted by me.    MDM     Amount and/or Complexity of Data Reviewed  Clinical lab tests: reviewed  Tests in the radiology section of CPT®: reviewed  Tests in the medicine section of CPT®: reviewed             Sepsis criteria was met in the emergency department and the Sepsis protocol (including antibiotic administration) was initiated.      SIRS criteria considered:   1.  Temperature > 100.4 or <98.6    2.  Heart Rate > 90    3.  Respiratory Rate > 22    4.  WBC > 12K or <4K.             Severe Sepsis:     Respiratory: Mechanical Ventilation or Bipap  Hypotension: SBP > 90 or MAP < 65  Renal: Creatinine > 2  Metabolic: Lactic Acid > 2  Hematologic: Platelets < 100K or INR > 1.5  Hepatic: BILI  >  2  CNS: Sudden AMS     Septic Shock:     Severe Sepsis + Persistent hypotension or Lactic Acid > 4     Normal saline bolus, Antibiotics, and final disposition was based on these definitions.        Sepsis was recognized at 14:18 EST      Antibiotics were ordered.     30 cc/kg bolus was indicated.         Total Critical Care time of 45 minutes. Total critical care time documented does not include time spent on separately billed procedures for services of nurses or physician assistants. I personally saw and examined the patient. I have reviewed all diagnostic interpretations and treatment plans as written. I was present for the key portions of any procedures performed and the inclusive time noted in any critical care statement. Critical care time includes patient management by me, time spent at the patients bedside,  time to review lab and imaging results, discussing  patient care, documentation in the medical record, and time spent with family or caregiver.    Patient Care Considerations:          Consultants/Shared Management Plan:    Hospitalist: I have discussed the case with Dr. Sam who agrees to accept the patient for admission.  Consultant: I have discussed the case with Dr. Dsouza who states he will come down to the emergency department to evaluate the patient soon.    Social Determinants of Health:    Patient is independent, reliable, and has access to care.       Disposition and Care Coordination:    Admit:   Through independent evaluation of the patient's history, physical, and imperical data, the patient meets criteria for inpatient admission to the hospital.        Final diagnoses:   Malignant neoplasm of right lung, unspecified part of lung   Hypoxia        ED Disposition       ED Disposition   Decision to Admit    Condition   --    Comment   Level of Care: Telemetry [5]   Diagnosis: Hypoxia [812738]   Isolate for COVID?: Yes [1]   Certification: I Certify That Inpatient Hospital Services Are Medically Necessary For Greater Than 2 Midnights                 This medical record created using voice recognition software.             Jose Medina MD  02/09/24 [id number]       Jose Medina MD  02/09/24 [id number]

## 2024-02-09 NOTE — H&P
[facility] HISTORY AND PHYSICAL  Date: 2024   Patient Name: Hermelindo Zuñiga  : 1955  MRN: [id number]  Primary Care Physician:  Antony Florence MD  Date of admission: 2024    Subjective   Subjective     Chief Complaint: Chest pain and shortness of breath    HPI:    Hermelindo Zuñiga is a 68 y.o. male past medical history of coronary artery disease with stents placed in 2023, lung mass that has not been biopsied at this point, diastolic heart failure, and hypertension who presents to the emergency department shortness of breath    Patient was seen on  with an ultrasound-guided right thoracentesis for pleural effusion.  700 cc were removed moved.  Patient initially felt very well but has started feeling short of breath since that time.  Of note he had a stent placed in 2023 so is on dual antiplatelet therapy.  He also has a mass in his lung that has not been biopsied and has not started treated on yet.  As result of all the symptoms he came the ER for further evaluation      In the emergency department patient's vital signs are as follows: Temperature is 97, pulse 71, blood pressure 81/67 on his right arm, satting 87% on 9 L of high flow.  Blood pressures taken on his left arm and this was normal.  This is showing that the lymphadenopathy is showing some hemodynamic issues with blood pressure on the right arm.  CBC shows no abnormality.  CMP shows a creatinine of 0.43, ionized calcium 1.03, and a sodium of 128.  ABG is 7.45/31.  CT scan shows no PE.  Findings consistent with advanced bronchogenic malignancy or lung cancer evident.  There is concern for possible SV syndrome due to expansion of tumor and adenopathy.  Also concern for postobstructive pneumonia.  Pulmonology was consulted and they will do bedside thoracentesis.  Patient be admitted for acute hypoxic respiratory failure due to obstructive pneumonia and worsening malignancy    All systems  reviewed abnormal as noted above    Personal History     Past Medical History:  COPD  CAD with history of MI with stents placed in December 2023  Chronic pain  Dyslipidemia  Hypertension  Diastolic heart failure with EF of 51 to 55% and grade 1 diastolic  Pulmonary hypertension with RVSP of 45 to 55 mm per mercury    Past Surgical History:  Cardiac catheterization    Family History:   No known family history of lung cancer    Social History:   Every day smoker  No alcohol use    Home Medications:  HYDROcodone-acetaminophen, albuterol sulfate HFA, aspirin, atorvastatin, cholecalciferol, gabapentin, hydrOXYzine, metoprolol succinate XL, nitroglycerin, ticagrelor, and tiotropium bromide-olodaterol    Allergies:  No Known Allergies    Objective   Objective     Vitals:   Temp:  [97 °F (36.1 °C)] 97 °F (36.1 °C)  Heart Rate:  [56-79] 71  Resp:  [18-24] 24  BP: ()/(58-90) 81/67  Flow (L/min):  [9-15] 9    Physical Exam    Constitutional: Chronically ill-appearing.  Mild respiratory distress   Eyes: Pupils equal, sclerae anicteric, no conjunctival injection   HENT: NCAT, mucous membranes moist   Neck: Supple, no thyromegaly, no lymphadenopathy, trachea midline   Respiratory: Decreased breath sounds on right side.  Wheezing rhonchi and coarse breath sounds on the left   Cardiovascular: RRR, no murmurs, rubs, or gallops, palpable pedal pulses bilaterally   Gastrointestinal: Positive bowel sounds, soft, nontender, nondistended   Musculoskeletal: No bilateral ankle edema, no clubbing or cyanosis to extremities   Psychiatric: Appropriate affect, cooperative   Neurologic: Oriented x 3, strength symmetric in all extremities, Cranial Nerves grossly intact to confrontation, speech clear   Skin: No rashes     Result Review    Result Review:  I have personally reviewed the results from the time of this admission to 2/9/2024 16:53 EST and agree with these findings:  ABG 7.45/31  Sodium 128 - likely due to SIADH due to lung  cancer      Assessment & Plan   Assessment / Plan     Assessment/Plan:   Acute hypoxic respiratory failure  Postobstructive pneumonia with risk for Pseudomonas and MRSA  Coronary artery disease with stent placed in December 2023 on dual antiplatelet therapy  Severe protein calorie malnutrition  Right-sided pleural effusion  Hyponatremia due to SIADH and lung cancer    Plan:  --Admit to hospital service  -- Consult pulmonary  -- Patient's blood pressure on his right arm is dramatically different than his left arm due to the malignancy.  Will need to watch for flushing and dramatic changes in blood pressure on the left arm due to adenopathy and malignancy spread to the SVC  --Will give the patient 2 L of fluids  -- Zosyn and vancomycin  -- Blood culture  -- Respiratory culture  -- Strep and Legionella urine antigen  -- Procalcitonin now and again in the morning  -- Brovana/Pulmicort/Amada  -- Follow-up on culture data from thoracentesis  -- Consult dietitian due to severe protein calorie malnutrition  --Repeat lactic acid  --Restart aspirin and Brilinta due to recent stent      DVT prophylaxis:  Lovenox        CODE STATUS:     DNR/DNI    Admission Status:  I believe this patient meets admission status.    Electronically signed by Vamsi Sam MD, 02/09/24, 4:53 PM EST.

## 2024-02-10 NOTE — PLAN OF CARE
Problem: Adult Inpatient Plan of Care  Goal: Plan of Care Review  Outcome: Ongoing, Progressing  Flowsheets (Taken 2/10/2024 1601)  Progress: improving  Plan of Care Reviewed With: patient  Outcome Evaluation: Patient alert and oriented throughout shift. Pain treated per MAR. Antibiotic therapy continued per MAR. Plan of care discussed with patient. Continuing with plan of care.   Goal Outcome Evaluation:  Plan of Care Reviewed With: patient        Progress: improving  Outcome Evaluation: Patient alert and oriented throughout shift. Pain treated per MAR. Antibiotic therapy continued per MAR. Plan of care discussed with patient. Continuing with plan of care.

## 2024-02-10 NOTE — PROGRESS NOTES
"[facility] Clinical Pharmacy Services: Vancomycin Monitoring Note    Hermelindo Zuñiga is a 68 y.o. male who is on day 2 of pharmacy to dose vancomycin for Pneumonia and Sepsis.    Previous Vancomycin Dose:   1000 mg IV every  12  hours  Imaging Reviewed?: Yes  Updated Cultures and Sensitivities:    Body Fluid cx, pleural cavity: No organisms seen   COVID, Flu, RSV: not detected   Mycoplasma pneumoniae antibody: not detected   Blood cx: in process    Vitals/Labs  Ht: 182.9 cm (72.01\"); Wt: 54.9 kg (121 lb 0.5 oz)   Temp (24hrs), Av.6 °F (36.4 °C), Min:97 °F (36.1 °C), Max:98 °F (36.7 °C)   Estimated Creatinine Clearance: 141.1 mL/min (A) (by C-G formula based on SCr of 0.4 mg/dL (L)).     Results from last 7 days   Lab Units 02/10/24  0532 24  1251   VANCOMYCIN RM mcg/mL 8.04  --    CREATININE mg/dL 0.40* 0.43*   WBC 10*3/mm3 5.57 7.03     Assessment/Plan    Current Vancomycin Dose:  1000 mg IV every 12 hours; which provides the following predicted parameters:  AUC24,ss: 454 mg/L.hr  PAUC*: 73 %  Ctrough,ss: 12.2 mg/L  Pconc*: 5 %  Tox.: 7 %  Next Vanc Trough ordered for  at 0600  We will continue to monitor patient changes and renal function     Thank you for involving pharmacy in this patient's care. Please contact pharmacy with any questions or concerns.    Bessy Petersen [facility]  Clinical Pharmacist    "

## 2024-02-10 NOTE — PROGRESS NOTES
Pulmonary / Critical Care Progress Note      Patient Name: Hermelindo Zuñiga  : 1955  MRN: [id number]  Primary Care Physician:  Antony Florence MD  Date of admission: 2024    Subjective   Subjective   Follow-up for pleural effusion, lung mass, shortness of breath    No acute events overnight.    This afternoon,  Lying in bed  Overall feeling better  Oxygen demand decreasing  Currently on 7 L HFNC  Congested cough  No fever or chills  No chest pain or hemoptysis  Remains weak and fatigued    Review of Systems  General: Fatigue, otherwise denied complaints  HEENT: Denied complaints  Respiratory: Dyspnea, cough, otherwise denied complaints  Cardiovascular: Denied complaints  GI: Denied complaints  : Denied complaints  MSK: Denied complaints      Objective   Objective     Vitals:   Temp:  [97.3 °F (36.3 °C)-98 °F (36.7 °C)] 97.3 °F (36.3 °C)  Heart Rate:  [70-79] 79  Resp:  [16-24] 20  BP: ()/(46-70) 107/51  Flow (L/min):  [9] 9    Physical Exam   Vital Signs Reviewed   General: Chronically ill-appearing, elderly male, alert, NAD, lying in bed  Chest:  Decreased aeration R>L, rhonchi to auscultation bilaterally, increased work of breathing noted on 7 L HFNC, pursed lip breathing noted   CV: RRR, no MGR, pulses 2+, equal.  Abd:  Soft, NT, ND, + BS, no HSM  EXT:  no clubbing, no cyanosis, no edema  Neuro:  A&Ox3, CN grossly intact, no focal deficits.  Skin: No rashes or lesions noted      Result Review    Result Review:  I have personally reviewed the results from the time of this admission to 2/10/2024 17:23 EST and agree with these findings:  [x]  Laboratory  [x]  Microbiology  [x]  Radiology  [x]  EKG/Telemetry   []  Cardiology/Vascular   []  Pathology  []  Old records  []  Other:  Most notable findings include:   Lactate 2.5  Procalcitonin 12.17 --> 13.23      Lab 02/10/24  0532 24  1733 24  1251   WBC 5.57  --  7.03   HEMOGLOBIN 10.5*  --  10.5*   HEMATOCRIT 31.9*  --  31.1*    PLATELETS 349  --  353   SODIUM 127*  --  128*   SODIUM, ARTERIAL  --   --  128.7*   POTASSIUM 3.4*  --  3.7   CHLORIDE 95*  --  94*   CO2 21.0*  --  23.5   BUN 11  --  9   CREATININE 0.40*  --  0.43*   GLUCOSE 77  --  91   GLUCOSE, ARTERIAL  --   --  85   CALCIUM 8.7  --  8.1*   TOTAL PROTEIN 6.0 6.5 6.2   ALBUMIN 2.9*  --  3.1*   GLOBULIN 3.1  --  3.1     CT Chest With Contrast Diagnostic    Result Date: 2/9/2024  PROCEDURE: CT CHEST W CONTRAST DIAGNOSTIC  COMPARISON:  [facility], PET, NM PET/CT SKULL BASE TO MID THIGH, 1/26/2024, 14:44.  [facility], CR, XR CHEST 1 VW, 1/30/2024, 13:14.  [facility], CR, XR CHEST 1 VW, 2/09/2024, 13:19.  [facility], CT, CT ANGIOGRAM ABDOMEN PELVIS, 1/10/2024, 12:57.  INDICATIONS: shortness of breath, worsening lung cancer  TECHNIQUE: CT images were obtained with non-ionic intravenous contrast material.   PROTOCOL:   Pulmonary embolism imaging protocol performed    RADIATION:   DLP: 409.6 mGy*cm   Automated exposure control was utilized to minimize radiation dose. CONTRAST: 100 cc Isovue 370 I.V. LABS:   eGFR: >60 ml/min/1.73m2  FINDINGS:  The pulmonary arteries are well opacified.  No filling defects are seen.  There are no findings of PE.  The main pulmonary artery is of similar diameter to the ascending aorta, consistent with pulmonary artery hypertension.  Mediastinal windows reveal 9 cm mass in the right upper lobe with bulky hilar and confluent mediastinal adenopathy consistent with bronchogenic malignancy or lung cancer.  This was hypermetabolic on PET scan.  Cervical, mediastinal, and upper abdominal adenopathy is again seen, which was also hypermetabolic on PET scan.  The superior vena cava is surrounded by bulky adenopathy, and is markedly narrowed.  Extensive coronary artery calcifications are evident.  Moderate right pleural effusion appears slightly larger.  Lung window images reveal marked emphysema.  No  airspace disease or mass is seen on the left.  Heterogeneous hepatic density is consistent with hypermetabolic metastatic lesions seen on CT scan.  Numerous hypermetabolic ICS lesions were seen on PET scan, no destructive bone lesion is evident.  CONTINUED ON NEXT PAGE...        Impression:   CT scan of the chest with IV contrast demonstrating no findings of PE.  Findings consistent with advanced bronchogenic malignancy or lung cancer are again evident.  The superior vena cava is surrounded by bulky adenopathy, and is markedly narrowed.      AJ TRAN MD       Electronically Signed and Approved By: AJ TRAN MD on 2/09/2024 at 15:49                Assessment & Plan   Assessment / Plan     Active Hospital Problems:  Active Hospital Problems    Diagnosis     **Hypoxia      Impression:  Acute on chronic hypoxic respiratory failure, wears O2 as needed  Right-sided pleural effusion  9 cm right upper lobe mass with bulky hilar /confluent mediastinal adenopathy  Chronic obstructive pulmonary disease  Current tobacco abuse of cigarettes  Hyponatremia  CAD with stent stent placement 12/23    Plan:  -Continue to wean O2 to maintain SpO2 greater than 90%  -CT chest reviewed revealing large right upper lobe mass with hilar and mediastinal adenopathy concerning for malignancy, moderate right pleural effusion, concern for postobstructive pneumonia  - 2/9 underwent right-sided thoracentesis with 1300 mL of serosanguineous pleural fluid drained, exudative per lights criteria, cultures NGTD, cytology pending  -Continue vancomycin and Zosyn for pneumonia and sepsis, may de-escalate pending cultures  -Procalcitonin 12.17 --> 13.23  -S pneumo, Legionella, mycoplasma negative  -Continue Brovana, Pulmicort and DuoNebs  -Continue bronchopulmonary hygiene.  Encourage use of I-S and flutter valve.  -Continue to monitor renal function and electrolytes.  Replace electrolytes as needed.  -Patient has a large right-sided lung mass  without formal diagnoses.  He will likely need EBUS bronchoscopy versus Ion navigational bronchoscopy for this mass.  However patient is currently on aspirin and Brilinta with recent stent placement.  Recommend consulting cardiology to see when it is appropriate to hold these medications for the procedure.  Will continue to monitor for now.  -Encourage mobilization.  Out of bed to chair.  -PT/OT on board.  Appreciate assistance.    DVT prophylaxis:  Medical DVT prophylaxis orders are present.        CODE STATUS:   Level Of Support Discussed With: Patient  Code Status (Patient has no pulse and is not breathing): No CPR (Do Not Attempt to Resuscitate)  Medical Interventions (Patient has pulse or is breathing): Full Support  Comments: DNR/DNI      Electronically signed by DINA Jorge, 02/10/24, 5:23 PM EST.    This patient was seen by both a physician and a NP. I, Chaitanya Dsouza MD, spent >50% of time in accordance with split shared billing. This included personally reviewing all pertinent labs, imaging, microbiology and documentation. Also discussing the case with the patient and any available family, the admitting physician and any available ancillary staff.   Electronically signed by Chaitanya Dsouza MD, 02/10/24, 8:19 PM EST.

## 2024-02-10 NOTE — PROGRESS NOTES
"[facility] Clinical Pharmacy Services: Vancomycin Pharmacokinetic Initial Consult Note    Hermelindo Zuñiga is a 68 y.o. male who is on day 1 of pharmacy to dose vancomycin for Pneumonia and Sepsis.    Consult Information  Consulting Provider: Vamsi Sam  Planned Duration of Therapy: 7 days (through )  Was Patient Receiving Prior to Admission/Consult?: No  Loading Dose Ordered or Given: 1000 mg ordered on @1800   PK/PD Target: -600 mg/L.hr   Other Antimicrobials: Piperacillin/Tazobactam    Imaging Reviewed?: Yes    Microbiology Data  MRSA PCR performed: No; Result: Pending  Culture/Source:    Blood cx: in process    Vitals/Labs  Ht: 182.9 cm (72\"); Wt: 54.7 kg (120 lb 9.5 oz)  Temp (24hrs), Av °F (36.1 °C), Min:97 °F (36.1 °C), Max:97 °F (36.1 °C)   Estimated Creatinine Clearance: 130.7 mL/min (A) (by C-G formula based on SCr of 0.43 mg/dL (L)).     Results from last 7 days   Lab Units 24  1251   CREATININE mg/dL 0.43*   WBC 10*3/mm3 7.03     Assessment/Plan:    Vancomycin Dose:  1000 mg IV every 12 hours; which provides the following predicted parameters:  AUC24,ss: 415 mg/L.hr  PAUC*: 54 %  Ctrough,ss: 11 mg/L  Pconc*: 13 %  Tox.: 7 %  Vanc Random ordered for 2/10 at AM labs  Patient has order for Complete Metabolic Panel    Pharmacy will follow patient's kidney function and will adjust doses and obtain levels as necessary. Thank you for involving pharmacy in this patient's care. Please contact pharmacy with any questions or concerns.                           Bessy Petersen [facility]  Clinical Pharmacist  "

## 2024-02-10 NOTE — CONSULTS
"Nutrition Services    Patient Name: Hermelindo Zuñiga  YOB: 1955  MRN: [id number]  Admission date: 2/9/2024      CLINICAL NUTRITION ASSESSMENT      Reason for Assessment  Physician Consult, MST Score 2+, and BMI   H&P:  Past Medical History:   Diagnosis Date    Arthritis     COPD (chronic obstructive pulmonary disease)     Heart attack     Hypertension         Current Problems:   Active Hospital Problems    Diagnosis     **Hypoxia         Nutrition/Diet History         Narrative   RD attempted to visit patient this afternoon. Another discipline in room at time. RD familiar with patient from previous admission. Patient with weight stability x 6 months but has a worrisome BMI of 16.41. Will order oral nutrition supplement TID and follow up with patient for supplement acceptance and to perform NFPE.      Anthropometrics        Current Height, Weight Height: 182.9 cm (72.01\")  Weight: 54.9 kg (121 lb 0.5 oz)   Current BMI Body mass index is 16.41 kg/m².   BMI Classification Underweight   % IBW 71%   Adjusted Body Weight (ABW)    Weight Hx  Wt Readings from Last 30 Encounters:   02/10/24 0612 54.9 kg (121 lb 0.5 oz)   02/09/24 1940 55 kg (121 lb 4.1 oz)   02/09/24 1247 54.7 kg (120 lb 9.5 oz)   02/06/24 1104 59.4 kg (131 lb)   01/22/24 1524 59.4 kg (131 lb)   01/10/24 1215 54.3 kg (119 lb 11.4 oz)   01/01/24 1321 65.2 kg (143 lb 11.8 oz)   12/25/23 1918 60.2 kg (132 lb 11.5 oz)   11/27/23 1541 56.7 kg (125 lb)   08/12/23 0840 54.7 kg (120 lb 9.5 oz)   06/09/23 1400 56.7 kg (124 lb 14.4 oz)          Wt Change Observation Stable x 6 months     Estimated/Assessed Needs  Estimated Needs based on: Current Body Weight 55 kg       Energy Requirements 35 kcal/kg   EST Needs (kcal/day) 1925 kcal       Protein Requirements 1.2 g/kg   EST Daily Needs (g/day) 66 g       Fluid Requirements 25 ml/kg    Estimated Needs (mL/day) 1375 ml     Labs/Medications         Pertinent Labs Reviewed.   Results from last 7 days "   Lab Units 02/10/24  0532 02/09/24  1251   SODIUM mmol/L 127* 128*   SODIUM, ARTERIAL mmol/L  --  128.7*   POTASSIUM mmol/L 3.4* 3.7   CHLORIDE mmol/L 95* 94*   CO2 mmol/L 21.0* 23.5   BUN mg/dL 11 9   CREATININE mg/dL 0.40* 0.43*   CALCIUM mg/dL 8.7 8.1*   BILIRUBIN mg/dL 0.5 0.6   ALK PHOS U/L 108 114   ALT (SGPT) U/L 9 10   AST (SGOT) U/L 27 26   GLUCOSE mg/dL 77 91   GLUCOSE, ARTERIAL mg/dL  --  85     Results from last 7 days   Lab Units 02/10/24  0532   MAGNESIUM mg/dL 1.8   HEMOGLOBIN g/dL 10.5*   HEMATOCRIT % 31.9*     COVID19   Date Value Ref Range Status   02/09/2024 Not Detected Not Detected - Ref. Range Final     Lab Results   Component Value Date    HGBA1C 5.40 12/25/2023         Pertinent Medications Reviewed.     Malnutrition Severity Assessment              Nutrition Diagnosis         Nutrition Dx Problem 1 Underweight related to decreased ability to consume sufficient energy as evidenced by  BMI 16.41.     Nutrition Intervention           Current Nutrition Orders & Evaluation of Intake       Current PO Diet Diet: Regular/House Diet; Texture: Regular Texture (IDDSI 7); Fluid Consistency: Thin (IDDSI 0)   Supplement Orders Placed This Encounter      Dietary Nutrition Supplements Boost Plus (Ensure Plus)           Nutrition Intervention/Prescription        Boost Plus TID  Provides 1080 kcal, 42 g  protein/day        Medical Nutrition Therapy/Nutrition Education          Learner     Readiness Patient  N/A     Method     Response N/A  N/A     Monitor/Evaluation        Monitor Per protocol, PO intake, Supplement intake, Pertinent labs, Weight, Symptoms, POC/GOC     Nutrition Discharge Plan         Recommend to continue oral nutrition supplements on discharge.      Electronically signed by:  Bess Watts RD  02/10/24 14:43 EST

## 2024-02-11 PROBLEM — I25.10 CAD S/P PERCUTANEOUS CORONARY ANGIOPLASTY: Status: ACTIVE | Noted: 2024-01-01

## 2024-02-11 PROBLEM — Z98.61 CAD S/P PERCUTANEOUS CORONARY ANGIOPLASTY: Status: ACTIVE | Noted: 2024-02-11

## 2024-02-11 NOTE — PROGRESS NOTES
Patient Care Team:  Antony Florence MD as PCP - General (Family Medicine)    Date: 2024  Patient Name: Hermelindo Zuñiga  : 1955  MRN: [id number]  Date of admission: 2024  Room/Bed: [facility]/      Subjective   Subjective         Chief Complaint: f/u shortness of breath     Summary:Hermelindo Zuñiga is a 68 y.o. male with a past medical history of coronary artery disease with stents placed in 2023, lung mass that has not been biopsied at this point, diastolic heart failure, and hypertension who presents to the emergency department shortness of breath     Patient was seen on  with an ultrasound-guided right thoracentesis for pleural effusion.  700 cc were removed moved.  Patient initially felt very well but has started feeling short of breath since that time.  Of note he had a stent placed in 2023 so is on dual antiplatelet therapy.  He also has a mass in his lung that has not been biopsied and has not started treated on yet.  As result of all the symptoms he came the ER for further evaluation        In the emergency department patient's vital signs are as follows: Temperature is 97, pulse 71, blood pressure 81/67 on his right arm, satting 87% on 9 L of high flow.  Blood pressures taken on his left arm and this was normal.  This is showing that the lymphadenopathy is showing some hemodynamic issues with blood pressure on the right arm.  CBC shows no abnormality.  CMP shows a creatinine of 0.43, ionized calcium 1.03, and a sodium of 128.  ABG is 7.45/31.  CT scan shows no PE.  Findings consistent with advanced bronchogenic malignancy or lung cancer evident.  There is concern for possible SV syndrome due to expansion of tumor and adenopathy.  Also concern for postobstructive pneumonia.  Pulmonology was consulted and they will do bedside thoracentesis.  Patient be admitted for acute hypoxic respiratory failure due to obstructive pneumonia and worsening malignancy    Interval  "Followup:   Patient states he feels about the same, continues to feel short of breath and fatigued.. Complaining of pain and swelling in his left arm when Vancomycin started infusing.       Review of Systems   Constitutional:  Positive for appetite change.   Respiratory:  Positive for shortness of breath.    Cardiovascular:  Positive for chest pain.   Gastrointestinal:  Positive for nausea and vomiting.         Objective   Objective       Vital Signs  Temp:  [97.3 °F (36.3 °C)-98.6 °F (37 °C)] 97.3 °F (36.3 °C)  Heart Rate:  [74-89] 78  Resp:  [18-22] 20  BP: ()/(48-68) 82/48  Oxygen Therapy  SpO2: 94 %  Pulse Oximetry Type: Continuous  Device (Oxygen Therapy): humidified, high-flow nasal cannula  $ High Flow Nasal Cannula Set-Up: yes  Flow (L/min): 9  Flowsheet Rows      Flowsheet Row First Filed Value   Admission Height 182.9 cm (72\") Documented at 02/09/2024 1247   Admission Weight 54.7 kg (120 lb 9.5 oz) Documented at 02/09/2024 1247          Intake & Output (last 3 days)         02/07 0701  02/08 0700 02/08 0701  02/09 0700 02/09 0701  02/10 0700 02/10 0701  02/11 0700    P.O.   480 360    IV Piggyback   1000     Total Intake(mL/kg)   1480 (27) 360 (6.6)    Urine (mL/kg/hr)   200 400 (0.6)    Total Output   200 400    Net   +1280 -40                  Lines, Drains & Airways       Active LDAs       Name Placement date Placement time Site Days    Peripheral IV 02/10/24 1502 Anterior;Left Forearm 02/10/24  1502  Forearm  less than 1                      Physical Exam  Vitals reviewed.   Constitutional:       General: He is not in acute distress.     Appearance: He is cachectic.   HENT:      Head: Normocephalic and atraumatic.      Mouth/Throat:      Mouth: Mucous membranes are moist.   Cardiovascular:      Rate and Rhythm: Normal rate.   Pulmonary:      Comments: Decreased aeration, scattered rhonchi, labored breathing   Abdominal:      General: Bowel sounds are normal.      Palpations: Abdomen is soft. " "  Musculoskeletal:      Right lower leg: No edema.      Left lower leg: No edema.      Comments: LUE swelling and erythema, ttp    Neurological:      Mental Status: He is alert.   Psychiatric:         Mood and Affect: Mood normal.         Behavior: Behavior normal.           Results Review:      Results from last 7 days   Lab Units 02/10/24  0532 02/09/24  1251   WBC 10*3/mm3 5.57 7.03   HEMOGLOBIN g/dL 10.5* 10.5*   HEMATOCRIT % 31.9* 31.1*   PLATELETS 10*3/mm3 349 353     Results from last 7 days   Lab Units 02/11/24  0508 02/10/24  0532 02/09/24  1251   SODIUM mmol/L 126* 127* 128*   SODIUM, ARTERIAL mmol/L  --   --  128.7*   POTASSIUM mmol/L 3.3* 3.4* 3.7   CHLORIDE mmol/L 92* 95* 94*   CO2 mmol/L 22.1 21.0* 23.5   BUN mg/dL 7* 11 9   CREATININE mg/dL 0.43* 0.40* 0.43*   CALCIUM mg/dL 8.5* 8.7 8.1*   BILIRUBIN mg/dL  --  0.5 0.6   ALK PHOS U/L  --  108 114   ALT (SGPT) U/L  --  9 10   AST (SGOT) U/L  --  27 26   GLUCOSE mg/dL 85 77 91   GLUCOSE, ARTERIAL mg/dL  --   --  85       Results from last 7 days   Lab Units 02/11/24  0508 02/10/24  0532   MAGNESIUM mg/dL 1.6 1.8       Blood Culture   Date Value Ref Range Status   02/09/2024 No growth at 24 hours  Preliminary       No results found for: \"MRSACX\"    No results found for: \"STOOLCX\"    No results found for: \"URINECX\"    No results found for: \"WOUNDCX\"    Imaging Results (Last 24 Hours)       ** No results found for the last 24 hours. **            I have personally reviewed the patient's new imaging and lab results.          ECG/EMG Results (most recent)       Procedure Component Value Units Date/Time    ECG 12 Lead ED Triage Standing Order; SOA [[id number]] Collected: 02/09/24 1254     Updated: 02/09/24 1635     QTC Interval -- ms     Narrative:      HEART RATE= 78  bpm  RR Interval= 764  ms  UT Interval= 184  ms  P Horizontal Axis= -67  deg  P Front Axis= 81  deg  QRSD Interval= 119  ms  QT Interval=   ms  QTcB= Invalid  ms  QRS Axis= 90  deg  T Wave Axis= " 83  deg  - ABNORMAL ECG -  Sinus rhythm  Nonspecific intraventricular conduction delay  Probable anterolateral infarct, age indeterm  When compared with ECG of 10-Joshua-2024 11:52:59,  New or worsened ischemia or infarction  Electronically Signed By: Hung Banks ([facility]) 09-Feb-2024 16:34:53  Date and Time of Study: 2024-02-09 12:54:27            Results for orders placed during the hospital encounter of 12/25/23    Adult Transthoracic Echo Complete W/ Cont if Necessary Per Protocol    Interpretation Summary    Left ventricular ejection fraction appears to be 51 - 55%.    Left ventricular diastolic function is consistent with (grade I) impaired relaxation and age.    Estimated right ventricular systolic pressure from tricuspid regurgitation is moderately elevated (45-55 mmHg).    Mild to moderate tricuspid regurgitation.          Medication Review:     Scheduled Meds:arformoterol, 15 mcg, Nebulization, BID - RT  aspirin, 81 mg, Oral, Daily  atorvastatin, 40 mg, Oral, Nightly  budesonide, 0.5 mg, Nebulization, BID - RT  enoxaparin, 30 mg, Subcutaneous, Daily  gabapentin, 600 mg, Oral, Q8H  ipratropium-albuterol, 3 mL, Nebulization, Q4H - RT  piperacillin-tazobactam, 3.375 g, Intravenous, Q8H  potassium chloride, 40 mEq, Oral, Q4H  sodium chloride, 1,000 mL, Intravenous, Once  sodium chloride, 10 mL, Intravenous, Q12H      Continuous Infusions:cangrelor 50 mg in 250 ml (200 mcg/ml) IV infusion, 0.75 mcg/kg/min  Pharmacy to Dose Zosyn,       PRN Meds:.  acetaminophen    HYDROcodone-acetaminophen    Morphine **AND** naloxone    Pharmacy to Dose Zosyn    sodium chloride    sodium chloride    sodium chloride      I have reviewed the patient's current medication list    Assessment & Plan   Assessment / Plan       Active Hospital Problems    Diagnosis  POA    **Hypoxia [R09.02]  Yes    CAD S/P percutaneous coronary angioplasty [I25.10, Z98.61]  Not Applicable       Acute hypoxic respiratory failure  Postobstructive pneumonia with  risk for Pseudomonas and MRSA  Right sided pleural effusion s/p thoracentesis 2/9/24  Coronary artery disease with stent placed in December 2023 on dual antiplatelet therapy  Severe protein calorie malnutrition  Hyponatremia due to SIADH and lung cancer  Hypokalemia      Plan:  --Admit to hospital service  -- pulmonary and cardiology following   -- Patient's blood pressure on his right arm is dramatically different than his left arm due to the malignancy.  Will need to watch for flushing and dramatic changes in blood pressure on the left arm due to adenopathy and malignancy spread to the SVC  - s/p right sided thoracentesis 2/9, exudative   - patient still requiring 9L HFNC  -- Blood culture- NGTD  -- Respiratory culture  -- MRSA, Strep and Legionella urine antigen- negative   -- dc Vancomycin, continue Zosyn  -- patient given hyaluronidase d/t Vancomycin infiltration  -- check LUE doppler    -- Brovana/Pulmicort/DuoNebs  -- Consult dietitian due to severe protein calorie malnutrition  --Repeat lactic acid wnl   -- replace potassium po   -- Discussed with pulmonology and cardiology and plan to stop Brilinta and continue aspirin 81mg. Started on IV cangrelor and will continue for now and turn off just prior to biopsy. Discussed with pulm and plan on EBUS likely Wednesday or Friday.    DVT Prophylaxis  - enoxaparin     Code Status  - DNR/DNI    Plan for disposition: pending progress    Patient remains high risk     Leelaa S Viet, DO  02/11/24  15:02 EST          Note disclaimer: At [facility], we believe that sharing information builds trust and better relationships. You are receiving this note because you recently visited [facility]. It is possible you will see health information before a provider has talked with you about it. This kind of information can be easy to misunderstand. To help you fully understand what it means for your health, we urge you to discuss this note with your provider.

## 2024-02-11 NOTE — PLAN OF CARE
Goal Outcome Evaluation:  Plan of Care Reviewed With: patient        Progress: improving       VSS this shift with no issues to report. Will continue with POC.

## 2024-02-11 NOTE — PROGRESS NOTES
Respiratory Therapist Broncho-Pulmonary Hygiene Progress Note      Patient Name:  Hermelindo Zuñiga  YOB: 1955    Hermelindo Zuñiga meets the qualification for Level 1 of the Bronco-Pulmonary Hygiene Protocol. This was based on my daily patient assessment and includes review of chest x-ray results, cough ability and quality, oxygenation, secretions or risk for secretion development and patient mobility.     Broncho-Pulmonary Hygiene Assessment:    Level of Movement: Actively changing positions without assistance  Alert/ oriented/ cooperative    Breath Sounds: Diminished and/or coarse rhonchi    Cough: Strong, effective    Chest X-Ray: Possible signs of consolidation and/or atelectasis or clear.     Sputum Productions: None or small amount of thin or watery secretions with effective cough    History and Physical: Chronic condition    SpO2 to Oxygen Need: greater than 90% on  greater than 6L, Vapotherm, oxygen mask greater than 40% or ventilator    Current SpO2 is: 94% on 9L High Flow cannula     Based on this information, I have completed the following interventions: Teach/Instruct patient on cough and deep breathe      Electronically signed by Azeb Hansen RRT, 02/11/24, 7:53 AM EST.

## 2024-02-11 NOTE — PLAN OF CARE
Goal Outcome Evaluation:   NSR. Blood pressure continues to run soft. Medicated for back pain. Left forearm with possible infiltration from Vancomycin. Extravasation protocol in place. NO acute distress noted. Continue with current POC.

## 2024-02-11 NOTE — CONSULTS
Cardiology Consult Note  [facility] 4TH FLOOR MEDICAL TELEMETRY UNIT          Patient Identification:  Hermelindo Zuñiga      [id number]  68 y.o.        male  1955           Reason for Consultation: PCI stent    PCP: Antony Florence MD    History of Present Illness:     Patient is a 68-year-old gentleman with a known history of CAD status post stents in December/23, essential hypertension,HFpEF,COPD, and lung mass concerning for malignancy who came back in due to increased weakness and came back in with increased weakness hypoxia and shortness of breath.  Pulmonary was consulted and going require biopsy for further identification of mass and possible treatment for lung cancer.  Patient has been compliant with his antiplatelets and denies any chest discomfort problems    Past History:  Past Medical History:   Diagnosis Date    Arthritis     COPD (chronic obstructive pulmonary disease)     Heart attack     Hypertension      Past Surgical History:   Procedure Laterality Date    CARDIAC CATHETERIZATION N/A 12/25/2023    Procedure: Left Heart Cath;  Surgeon: Jeancarlos White MD;  Location: [facility] CATH INVASIVE LOCATION;  Service: Cardiovascular;  Laterality: N/A;    CARDIAC CATHETERIZATION N/A 12/25/2023    Procedure: Percutaneous Coronary Intervention;  Surgeon: Jeancarlos White MD;  Location: [facility] CATH INVASIVE LOCATION;  Service: Cardiovascular;  Laterality: N/A;     No Known Allergies  Social History     Socioeconomic History    Marital status:    Tobacco Use    Smoking status: Every Day     Packs/day: 0.50     Years: 50.00     Additional pack years: 0.00     Total pack years: 25.00     Types: Cigarettes     Start date: 1999    Smokeless tobacco: Never    Tobacco comments:     Smoking 1 cigarette every 2-3 days. -4--js   Vaping Use    Vaping Use: Never used   Substance and Sexual Activity    Alcohol use: Never    Drug use: Never    Sexual activity: Defer     History reviewed. No  pertinent family history.    Medications:  Prior to Admission medications    Medication Sig Start Date End Date Taking? Authorizing Provider   albuterol sulfate  (90 Base) MCG/ACT inhaler Inhale 2 puffs Every 4 (Four) Hours As Needed for Wheezing or Shortness of Air for up to 30 days. 1/22/24 2/21/24 Yes Adriana Choudhary APRN   Cholecalciferol 25 MCG (1000 UT) tablet Take 1 tablet by mouth Daily.   Yes ProviderPricila MD   gabapentin (NEURONTIN) 600 MG tablet Take 1.5 tablets by mouth 3 (Three) Times a Day.   Yes ProviderPricila MD   HYDROcodone-acetaminophen (NORCO)  MG per tablet Take 1 tablet by mouth Every 8 (Eight) Hours As Needed for Moderate Pain.   Yes Pricila Hicks MD   hydrOXYzine (ATARAX) 25 MG tablet Take 1 tablet by mouth 3 (Three) Times a Day As Needed for Itching.   Yes Pricila Hicks MD   metoprolol succinate XL (TOPROL-XL) 25 MG 24 hr tablet Take 1 tablet by mouth Daily. 2/6/24  Yes Toyin Robins APRN   ticagrelor (BRILINTA) 90 MG tablet tablet Take 1 tablet by mouth 2 (Two) Times a Day. 2/6/24  Yes Toyin Robins APRN   tiotropium bromide-olodaterol (Stiolto Respimat) 2.5-2.5 MCG/ACT aerosol solution inhaler Inhale 2 puffs Daily for 30 days. 1/22/24 2/21/24 Yes Adriana Choudhary APRN   aspirin 81 MG EC tablet Take 1 tablet by mouth Daily. 2/6/24   Toyin Robins APRN   atorvastatin (LIPITOR) 40 MG tablet Take 1 tablet by mouth Every Night. 2/6/24   Toyin Robins APRN   nitroglycerin (NITROSTAT) 0.4 MG SL tablet Place 1 tablet under the tongue Every 5 (Five) Minutes As Needed for Chest Pain. Take no more than 3 doses in 15 minutes. 12/28/23   Steve Kendall MD      Current medications:  arformoterol, 15 mcg, Nebulization, BID - RT  aspirin, 81 mg, Oral, Daily  atorvastatin, 40 mg, Oral, Nightly  budesonide, 0.5 mg, Nebulization, BID - RT  enoxaparin, 30 mg, Subcutaneous, Daily  gabapentin, 600 mg, Oral,  "Q8H  ipratropium-albuterol, 3 mL, Nebulization, Q4H - RT  piperacillin-tazobactam, 3.375 g, Intravenous, Q8H  potassium chloride, 40 mEq, Oral, Q4H  sodium chloride, 1,000 mL, Intravenous, Once  sodium chloride, 10 mL, Intravenous, Q12H      Current IV drips:  cangrelor 50 mg in 250 ml (200 mcg/ml) IV infusion, 0.75 mcg/kg/min  Pharmacy to dose vancomycin,   Pharmacy to Dose Zosyn,         Review of Systems   Constitutional: Negative for chills, fever and weight loss.   HENT:  Negative for congestion and nosebleeds.    Cardiovascular:  Negative for orthopnea and paroxysmal nocturnal dyspnea.   Respiratory:  Positive for shortness of breath. Negative for cough.    Endocrine: Negative for cold intolerance and heat intolerance.   Skin:  Negative for rash.   Musculoskeletal:  Positive for muscle weakness. Negative for back pain.   Gastrointestinal:  Negative for abdominal pain, nausea and vomiting.   Genitourinary:  Negative for dysuria and nocturia.   Neurological:  Negative for dizziness and light-headedness.   Psychiatric/Behavioral:  Negative for altered mental status and hallucinations.          Physical Exam    /60 (BP Location: Left arm, Patient Position: Lying)   Pulse 78   Temp 97.5 °F (36.4 °C) (Oral)   Resp 20   Ht 182.9 cm (72.01\")   Wt 54.9 kg (121 lb 0.5 oz)   SpO2 94%   BMI 16.41 kg/m²  Body mass index is 16.41 kg/m².   Oxygen saturation   @FLOWAN(10::1)@ SpO2  Min: 91 %  Max: 97 %    General Appearance:   no acute distress  Alert and oriented x3  HENT:   lips not cyanotic  Atraumatic  Neck:  thyroid not enlarged  supple  Respiratory:  no respiratory distress  normal breath sounds  no rales  Cardiovascular:  no jugular venous distention  regular rhythm  apical impulse normal  S1 normal, S2 normal  no S3, no S4   no murmur  no rub, no thrill  no carotid bruit  pedal pulses normal  lower extremity edema: none    Gastrointestinal:   bowel sounds normal  non-tender  no hepatomegaly, no " splenomegaly  Musculoskeletal:  no clubbing of fingers.   normocephalic, head atraumatic  Skin:   warm, dry  No rashes  Neuro/Psychiatric:  normal mood and affect  judgement and insight appropriate      Cardiographics:     ECG  (personally reviewed)       Results for orders placed during the hospital encounter of 12/25/23    Adult Transthoracic Echo Complete W/ Cont if Necessary Per Protocol    Interpretation Summary    Left ventricular ejection fraction appears to be 51 - 55%.    Left ventricular diastolic function is consistent with (grade I) impaired relaxation and age.    Estimated right ventricular systolic pressure from tricuspid regurgitation is moderately elevated (45-55 mmHg).    Mild to moderate tricuspid regurgitation.         Results for orders placed during the hospital encounter of 12/25/23    Cardiac Catheterization/Vascular Study    Narrative  OPERATORS  Jeancarlos White M.D. (Attending Cardiologist)    PROCEDURES PERFORMED  Ultrasound guided Vascular access  Left Heart Catheterization  Coronary Angiogram  PCI of left circumflex artery  IVUS of left circumflex artery  PCI of mid LAD  IVUS of LAD  Moderate sedation    INDICATIONS FOR PROCEDURE  68 years old man with multiple cardiovascular risk factors presented to the hospital with chest pain and dynamic ECG changes in the anterolateral leads.  After receiving nitroglycerin he became severely hypotensive into 70s systolic.  After emergent discussion of risk and benefit of the procedure he was brought to the Cath Lab for definitive coronary angiography.    PROCEDURE IN DETAIL  Informed consent was obtained from the patient after explaining the risks, benefits, and alternative options of the procedure. After obtaining informed consent, the patient was brought to the cath lab and was prepped in a sterile fashion. Lidocaine 2% was used for local anesthesia into the right femoral access site. The right femoral artery was accessed with a micropuncture needle  via modified Seldinger technique under ultrasound guidance. A 6F was inserted successfully. Afterwards, 6F JR4 and JL5 diagnostic catheters were advanced over a wire into the ascending aorta and were used to engage the ostia of the left main and RCA respectively. JR4 used to cross the AV and obtain LV pressures and gradient across the AV measured via pullback technique. Images of the right and left coronary systems were obtained.    HEMODYNAMICS    LV: 115/0, 3 mmHg  AO: 119/69, 89 mmHg  No significant gradient across the aortic valve during pullback of JR4 catheter.  LV gram was not performed during the study.    FINDINGS  Coronary Angiogram    Right dominant circulation    Left main: Left main is a large caliber vessel which gives rise to the Left Anterior Descending and the Left circumflex.  Ostium of the left main has 20% stenosis.    Left Anterior Descending Artery: LAD is a medium caliber vessel which gives rise to several septal perforators and several diagonal branches.  Mid LAD has 80% tubular stenosis.  JANELL-3 flow    Left Circumflex: Left circumflex artery gives rise to obtuse marginals.  Proximal left circumflex has tubular 80% hazy appearing stenosis.  Mid segment of left circumflex at bifurcation with OM has a complex 70% stenosis in both segments.  JANELL-3 flow    Right Coronary Artery: The RCA is a medium caliber vessel gives rise to PDA and PLV.  Mid RCA has diffuse 40% stenosis.      Percutaneous coronary intervention  Diagnostic coronary angiography suggested both left circumflex and LAD to be contributed to ischemia and symptoms.  Left circumflex being the worst of the 2 vessels.    100 units/kg of heparin was administered and ACT of more than 250 was documented.  A 6 K[place] XB 4.0 guide catheter was used to engage the ostium of the left main coronary artery.  0.014 run-through wire was then advanced into the left circumflex artery.  I then advanced intravascular ultrasound catheter into the  proximal segment of left circumflex.  IVUS showed proximal left circumflex to be 3 mm and mid left circumflex to be 3.5 mm in dimension.  IVUS also showed calcification with thrombus in the proximal left circumflex.  The lesion in the proximal left circumflex was predilated with 2.5 x 12 mm compliant balloon at 12 lisa.  This was followed by placement of 3 x 15 mm Xience magi point stent which was then postdilated with 3.25 x 12 mm noncompliant balloon at 14 lisa.  I then noted a new 50% stenosis at the ostium of left circumflex artery which was treated with balloon angioplasty by a 3 x 12 mm compliant balloon at 12 lisa.  Final angiography showed 20-30% stenosis at the ostium of left circumflex artery.  JANELL-3 flow in the left circumflex artery and OM.  I did not treat mid left circumflex vessel as it was a complex bifurcating lesion which would have required more contrast and time while the patient was hypotensive receiving pressors and IV fluid.    I then quickly diverted my attention to the LAD which was wired with 0.014 run-through wire.  Intravascular ultrasound catheter was then advanced into the mid LAD and a slow manual pullback was performed.  IVUS showed LAD size to be 3.5 mm.  IVUS also confirmed 80% calcific stenosis in the mid LAD.  I then predilated this lesion with 3.25 x 12 mm noncompliant balloon at 12 lisa.  This was followed by placement of 3.25 x 33 mm Xience magi point stent.  The stent was postdilated with 3.5 x 12 mm noncompliant balloon at 16 lisa.  Final angiography showed 0% stenosis in LAD and JANELL-3 flow.    All the catheters were exchanged over a wire and subsequently removed.  6 Swedish introducer sheath was left in place to be pulled manually at a later time when ACT is less than 150    ESTIMATED BLOOD LOSS:  10 ml    COMPLICATIONS:  None      IMPRESSIONS  PCI of proximal left circumflex and mid LAD.  Low LVEDP    RECOMMENDATIONS  -Start dual antiplatelet therapy, high intensity statin and  "beta-blocker  -Staged bifurcation PCI of mid left circumflex artery/OM  -Smoking cessation  -Obtain an echocardiogram  -Cardiac rehab at the time of discharge.    Electronically signed by Jeancarlos White MD, 12/25/23, 9:52 PM EST.     Cardiolite (Tc-99m Sestamibi) stress test                  Lab Review:       CBC          1/10/2024    12:28 2/9/2024    12:51 2/10/2024    05:32   CBC   WBC 5.96  7.03  5.57    RBC 3.78  3.31  3.37    Hemoglobin 12.0  10.5  10.5    Hematocrit 34.8  31.1  31.9    MCV 92.1  94.0  94.7    MCH 31.7  31.7  31.2    MCHC 34.5  33.8  32.9    RDW 13.3  14.9  15.1    Platelets 377  353  349        CMP          2/9/2024    12:51 2/9/2024    17:33 2/10/2024    05:32 2/11/2024    05:08   CMP   Glucose 91     85   77  85    BUN 9   11  7    Creatinine 0.43   0.40  0.43    EGFR 117.7   120.3  117.7    Sodium 128     128.7   127  126    Potassium 3.7   3.4  3.3    Chloride 94   95  92    Calcium 8.1   8.7  8.5    Total Protein 6.2  6.5  6.0     Albumin 3.1   2.9     Globulin 3.1   3.1     Total Bilirubin 0.6   0.5     Alkaline Phosphatase 114   108     AST (SGOT) 26   27     ALT (SGPT) 10   9     Albumin/Globulin Ratio 1.0   0.9     BUN/Creatinine Ratio 20.9   27.5  16.3    Anion Gap 10.5   11.0  11.9         CARDIAC LABS:      Lab 02/09/24  1251   PROBNP 446.2   HSTROP T 21      No results found for: \"DIGOXIN\"   No results found for: \"TSH\"        Invalid input(s): \"LDLCALC\"  No results found for: \"POCTROP\"  No components found for: \"DDIMERQUAN\"  Lab Results   Component Value Date    MG 1.6 02/11/2024             CARDIAC LABS:      Lab 02/09/24  1251   PROBNP 446.2   HSTROP T 21           Assessment:    Hypoxia    CAD S/P percutaneous coronary angioplasty      CAD status post stents on 12/25/2023 due to MI who is here for evaluation due to large lung mass suspicious for cancer is good on needle biopsy patient is not exhibiting any signs of unstable angina his EKG and troponin are within normal limits.  " Overall the patient is at higher risk for stopping dual antiplatelet therapy still but given the need for biopsy would recommend continuing at least aspirin 81 mg will hold Brilinta today and go ahead and start on IV cangrelor which can be turned off just prior to the biopsy and restarted afterwards to minimize the duration of antiplatelet therapy.    Thank you for allowing us to share in [facility].            Hung Banks MD   2/11/2024    11:59 EST

## 2024-02-11 NOTE — PROGRESS NOTES
Patient Care Team:  Antony Florence MD as PCP - General (Family Medicine)    Date: 2/10/2024  Patient Name: Hermelindo Zuñiga  : 1955  MRN: [id number]  Date of admission: 2024  Room/Bed: [facility]/      Subjective   Subjective         Chief Complaint: f/u shortness of breath     Summary:Hermelindo Zuñiga is a 68 y.o. male with a past medical history of coronary artery disease with stents placed in 2023, lung mass that has not been biopsied at this point, diastolic heart failure, and hypertension who presents to the emergency department shortness of breath     Patient was seen on  with an ultrasound-guided right thoracentesis for pleural effusion.  700 cc were removed moved.  Patient initially felt very well but has started feeling short of breath since that time.  Of note he had a stent placed in 2023 so is on dual antiplatelet therapy.  He also has a mass in his lung that has not been biopsied and has not started treated on yet.  As result of all the symptoms he came the ER for further evaluation        In the emergency department patient's vital signs are as follows: Temperature is 97, pulse 71, blood pressure 81/67 on his right arm, satting 87% on 9 L of high flow.  Blood pressures taken on his left arm and this was normal.  This is showing that the lymphadenopathy is showing some hemodynamic issues with blood pressure on the right arm.  CBC shows no abnormality.  CMP shows a creatinine of 0.43, ionized calcium 1.03, and a sodium of 128.  ABG is 7.45/31.  CT scan shows no PE.  Findings consistent with advanced bronchogenic malignancy or lung cancer evident.  There is concern for possible SV syndrome due to expansion of tumor and adenopathy.  Also concern for postobstructive pneumonia.  Pulmonology was consulted and they will do bedside thoracentesis.  Patient be admitted for acute hypoxic respiratory failure due to obstructive pneumonia and worsening malignancy    Interval  "Followup:   Patient reports he is still feeling short of breath, wants to eat, but complains of nausea and vomiting at home.         Review of Systems   Constitutional:  Positive for appetite change.   Respiratory:  Positive for shortness of breath.    Cardiovascular:  Positive for chest pain.   Gastrointestinal:  Positive for nausea and vomiting.         Objective   Objective       Vital Signs  Temp:  [97.3 °F (36.3 °C)-98 °F (36.7 °C)] 97.3 °F (36.3 °C)  Heart Rate:  [70-79] 79  Resp:  [16-24] 20  BP: ()/(46-70) 107/51  Oxygen Therapy  SpO2: 97 %  Pulse Oximetry Type: Intermittent  Device (Oxygen Therapy): humidified, high-flow nasal cannula  $ High Flow Nasal Cannula Set-Up: yes  Flow (L/min): 9  Flowsheet Rows      Flowsheet Row First Filed Value   Admission Height 182.9 cm (72\") Documented at 02/09/2024 1247   Admission Weight 54.7 kg (120 lb 9.5 oz) Documented at 02/09/2024 1247          Intake & Output (last 3 days)         02/07 0701  02/08 0700 02/08 0701  02/09 0700 02/09 0701  02/10 0700 02/10 0701  02/11 0700    P.O.   480 360    IV Piggyback   1000     Total Intake(mL/kg)   1480 (27) 360 (6.6)    Urine (mL/kg/hr)   200 400 (0.6)    Total Output   200 400    Net   +1280 -40                  Lines, Drains & Airways       Active LDAs       Name Placement date Placement time Site Days    Peripheral IV 02/10/24 1502 Anterior;Left Forearm 02/10/24  1502  Forearm  less than 1                      Physical Exam  Vitals reviewed.   Constitutional:       General: He is not in acute distress.     Appearance: He is cachectic. He is not toxic-appearing.   HENT:      Head: Normocephalic and atraumatic.      Mouth/Throat:      Mouth: Mucous membranes are moist.   Cardiovascular:      Rate and Rhythm: Normal rate.   Pulmonary:      Comments: Decreased aeration, scattered rhonchi, labored breathing   Abdominal:      General: Bowel sounds are normal.      Palpations: Abdomen is soft.   Musculoskeletal:      Right " "lower leg: No edema.      Left lower leg: No edema.   Psychiatric:         Mood and Affect: Mood normal.         Behavior: Behavior normal.           Results Review:      Results from last 7 days   Lab Units 02/10/24  0532 02/09/24  1251   WBC 10*3/mm3 5.57 7.03   HEMOGLOBIN g/dL 10.5* 10.5*   HEMATOCRIT % 31.9* 31.1*   PLATELETS 10*3/mm3 349 353     Results from last 7 days   Lab Units 02/10/24  0532 02/09/24  1251   SODIUM mmol/L 127* 128*   SODIUM, ARTERIAL mmol/L  --  128.7*   POTASSIUM mmol/L 3.4* 3.7   CHLORIDE mmol/L 95* 94*   CO2 mmol/L 21.0* 23.5   BUN mg/dL 11 9   CREATININE mg/dL 0.40* 0.43*   CALCIUM mg/dL 8.7 8.1*   BILIRUBIN mg/dL 0.5 0.6   ALK PHOS U/L 108 114   ALT (SGPT) U/L 9 10   AST (SGOT) U/L 27 26   GLUCOSE mg/dL 77 91   GLUCOSE, ARTERIAL mg/dL  --  85       Results from last 7 days   Lab Units 02/10/24  0532   MAGNESIUM mg/dL 1.8       Blood Culture   Date Value Ref Range Status   02/09/2024 No growth at 24 hours  Preliminary       No results found for: \"MRSACX\"    No results found for: \"STOOLCX\"    No results found for: \"URINECX\"    No results found for: \"WOUNDCX\"    Imaging Results (Last 24 Hours)       ** No results found for the last 24 hours. **            I have personally reviewed the patient's new imaging and lab results.          ECG/EMG Results (most recent)       Procedure Component Value Units Date/Time    ECG 12 Lead ED Triage Standing Order; SOA [[id number]] Collected: 02/09/24 1254     Updated: 02/09/24 1635     QTC Interval -- ms     Narrative:      HEART RATE= 78  bpm  RR Interval= 764  ms  SC Interval= 184  ms  P Horizontal Axis= -67  deg  P Front Axis= 81  deg  QRSD Interval= 119  ms  QT Interval=   ms  QTcB= Invalid  ms  QRS Axis= 90  deg  T Wave Axis= 83  deg  - ABNORMAL ECG -  Sinus rhythm  Nonspecific intraventricular conduction delay  Probable anterolateral infarct, age indeterm  When compared with ECG of 10-Joshua-2024 11:52:59,  New or worsened ischemia or " infarction  Electronically Signed By: Hung Banks (CHUY) 09-Feb-2024 16:34:53  Date and Time of Study: 2024-02-09 12:54:27            Results for orders placed during the hospital encounter of 12/25/23    Adult Transthoracic Echo Complete W/ Cont if Necessary Per Protocol    Interpretation Summary    Left ventricular ejection fraction appears to be 51 - 55%.    Left ventricular diastolic function is consistent with (grade I) impaired relaxation and age.    Estimated right ventricular systolic pressure from tricuspid regurgitation is moderately elevated (45-55 mmHg).    Mild to moderate tricuspid regurgitation.          Medication Review:     Scheduled Meds:arformoterol, 15 mcg, Nebulization, BID - RT  aspirin, 81 mg, Oral, Daily  atorvastatin, 40 mg, Oral, Nightly  budesonide, 0.5 mg, Nebulization, BID - RT  enoxaparin, 30 mg, Subcutaneous, Daily  gabapentin, 600 mg, Oral, Q8H  ipratropium-albuterol, 3 mL, Nebulization, Q4H - RT  piperacillin-tazobactam, 3.375 g, Intravenous, Q8H  sodium chloride, 1,000 mL, Intravenous, Once  sodium chloride, 10 mL, Intravenous, Q12H  ticagrelor, 90 mg, Oral, BID  vancomycin, 1,000 mg, Intravenous, Q12H      Continuous Infusions:Pharmacy to dose vancomycin,   Pharmacy to Dose Zosyn,       PRN Meds:.  acetaminophen    HYDROcodone-acetaminophen    Morphine **AND** naloxone    Pharmacy to dose vancomycin    Pharmacy to Dose Zosyn    sodium chloride    sodium chloride    sodium chloride      I have reviewed the patient's current medication list    Assessment & Plan   Assessment / Plan       Active Hospital Problems    Diagnosis  POA    **Hypoxia [R09.02]  Yes       Acute hypoxic respiratory failure  Postobstructive pneumonia with risk for Pseudomonas and MRSA  Coronary artery disease with stent placed in December 2023 on dual antiplatelet therapy  Severe protein calorie malnutrition  Right-sided pleural effusion  Hyponatremia due to SIADH and lung cancer     Plan:  --Admit to hospital  service  -- Consult pulmonary and cardiology  -- Patient's blood pressure on his right arm is dramatically different than his left arm due to the malignancy.  Will need to watch for flushing and dramatic changes in blood pressure on the left arm due to adenopathy and malignancy spread to the SVC  - s/p right sided thoracentesis 2/9, exudative   - patient still requiring 9L HFNC  - cultures pending   -- continue IV Zosyn and vancomycin  -- Blood culture  -- Respiratory culture  -- Strep and Legionella urine antigen  -- Procalcitonin now and again in the morning  -- Brovana/Pulmicort/DuoNebs  -- Consult dietitian due to severe protein calorie malnutrition  --Repeat lactic acid  --Restart aspirin and Brilinta due to recent stent, I have discussed with pulmonology and they has ask me to consult cardiology to help give us an opinion on holding these medications for possible biopsy    DVT Prophylaxis  - enoxaparin     Code Status  - DNR/DNI    Plan for disposition: pending progress    Patient is high risk     Karey Llanos DO  02/10/24  19:00 EST          Note disclaimer: At [facility], we believe that sharing information builds trust and better relationships. You are receiving this note because you recently visited [facility]. It is possible you will see health information before a provider has talked with you about it. This kind of information can be easy to misunderstand. To help you fully understand what it means for your health, we urge you to discuss this note with your provider.

## 2024-02-11 NOTE — PROGRESS NOTES
Pulmonary / Critical Care Progress Note      Patient Name: Hermelindo Zuñiga  : 1955  MRN: [id number]  Primary Care Physician:  Antony Florence MD  Date of admission: 2024    Subjective   Subjective   Follow-up for pleural effusion, lung mass, shortness of breath    No acute events overnight.    This afternoon,  Lying in bed  Feeling slightly better  Oxygen demand increased  Currently on 11 L HFNC  Continues to have congested cough  No fever or chills  No chest pain or hemoptysis  Remains weak and fatigued    Review of Systems  General: Fatigue, otherwise denied complaints  HEENT: Denied complaints  Respiratory: Dyspnea, cough, otherwise denied complaints  Cardiovascular: Denied complaints  GI: Denied complaints  : Denied complaints  MSK: Denied complaints      Objective   Objective     Vitals:   Temp:  [97.3 °F (36.3 °C)-98.6 °F (37 °C)] 97.7 °F (36.5 °C)  Heart Rate:  [74-97] 97  Resp:  [18-22] 20  BP: ()/(48-68) 88/52  Flow (L/min):  [9] 9    Physical Exam   Vital Signs Reviewed   General: Chronically ill-appearing, elderly male, alert, NAD, lying in bed  Chest:  Decreased aeration R>L, rhonchi to auscultation bilaterally, no work of breathing noted on 11 L HFNC, pursed lip breathing noted   CV: RRR, no MGR, pulses 2+, equal.  Abd:  Soft, NT, ND, + BS, no HSM  EXT:  no clubbing, no cyanosis, no edema  Neuro:  A&Ox3, CN grossly intact, no focal deficits.  Skin: No rashes or lesions noted      Result Review    Result Review:  I have personally reviewed the results from the time of this admission to 2024 17:36 EST and agree with these findings:  [x]  Laboratory  [x]  Microbiology  [x]  Radiology  [x]  EKG/Telemetry   []  Cardiology/Vascular   []  Pathology  []  Old records  []  Other:  Most notable findings include:   Lactate 2.5  Procalcitonin 12.17 --> 13.23      Lab 24  0508 02/10/24  0532 24  1733 24  1251   WBC  --  5.57  --  7.03   HEMOGLOBIN  --  10.5*  --   10.5*   HEMATOCRIT  --  31.9*  --  31.1*   PLATELETS  --  349  --  353   SODIUM 126* 127*  --  128*   SODIUM, ARTERIAL  --   --   --  128.7*   POTASSIUM 3.3* 3.4*  --  3.7   CHLORIDE 92* 95*  --  94*   CO2 22.1 21.0*  --  23.5   BUN 7* 11  --  9   CREATININE 0.43* 0.40*  --  0.43*   GLUCOSE 85 77  --  91   GLUCOSE, ARTERIAL  --   --   --  85   CALCIUM 8.5* 8.7  --  8.1*   TOTAL PROTEIN  --  6.0 6.5 6.2   ALBUMIN  --  2.9*  --  3.1*   GLOBULIN  --  3.1  --  3.1     CT Chest With Contrast Diagnostic    Result Date: 2/9/2024  PROCEDURE: CT CHEST W CONTRAST DIAGNOSTIC  COMPARISON:  [facility], PET, NM PET/CT SKULL BASE TO MID THIGH, 1/26/2024, 14:44.  [facility], CR, XR CHEST 1 VW, 1/30/2024, 13:14.  [facility], CR, XR CHEST 1 VW, 2/09/2024, 13:19.  [facility], CT, CT ANGIOGRAM ABDOMEN PELVIS, 1/10/2024, 12:57.  INDICATIONS: shortness of breath, worsening lung cancer  TECHNIQUE: CT images were obtained with non-ionic intravenous contrast material.   PROTOCOL:   Pulmonary embolism imaging protocol performed    RADIATION:   DLP: 409.6 mGy*cm   Automated exposure control was utilized to minimize radiation dose. CONTRAST: 100 cc Isovue 370 I.V. LABS:   eGFR: >60 ml/min/1.73m2  FINDINGS:  The pulmonary arteries are well opacified.  No filling defects are seen.  There are no findings of PE.  The main pulmonary artery is of similar diameter to the ascending aorta, consistent with pulmonary artery hypertension.  Mediastinal windows reveal 9 cm mass in the right upper lobe with bulky hilar and confluent mediastinal adenopathy consistent with bronchogenic malignancy or lung cancer.  This was hypermetabolic on PET scan.  Cervical, mediastinal, and upper abdominal adenopathy is again seen, which was also hypermetabolic on PET scan.  The superior vena cava is surrounded by bulky adenopathy, and is markedly narrowed.  Extensive coronary artery calcifications are evident.   Moderate right pleural effusion appears slightly larger.  Lung window images reveal marked emphysema.  No airspace disease or mass is seen on the left.  Heterogeneous hepatic density is consistent with hypermetabolic metastatic lesions seen on CT scan.  Numerous hypermetabolic ICS lesions were seen on PET scan, no destructive bone lesion is evident.  CONTINUED ON NEXT PAGE...        Impression:   CT scan of the chest with IV contrast demonstrating no findings of PE.  Findings consistent with advanced bronchogenic malignancy or lung cancer are again evident.  The superior vena cava is surrounded by bulky adenopathy, and is markedly narrowed.      AJ TRAN MD       Electronically Signed and Approved By: AJ TRAN MD on 2/09/2024 at 15:49                Assessment & Plan   Assessment / Plan     Active Hospital Problems:  Active Hospital Problems    Diagnosis     **Hypoxia     CAD S/P percutaneous coronary angioplasty      Impression:  Acute on chronic hypoxic respiratory failure, wears O2 as needed  Right-sided pleural effusion  9 cm right upper lobe mass with bulky hilar /confluent mediastinal adenopathy  Chronic obstructive pulmonary disease  Current tobacco abuse of cigarettes  Hyponatremia  CAD with stent stent placement 12/23    Plan:  -Continue to wean O2 to maintain SpO2 greater than 90%  -CT chest reviewed revealing large right upper lobe mass with hilar and mediastinal adenopathy concerning for malignancy, moderate right pleural effusion, concern for postobstructive pneumonia  - 2/9 underwent right-sided thoracentesis with 1300 mL of serosanguineous pleural fluid drained, exudative per lights criteria, cultures NGTD, cytology pending  -Continue Zosyn for pneumonia and sepsis, may de-escalate pending cultures  -MRSA PCR negative.  Vancomycin discontinued.  -Procalcitonin 12.17 --> 13.23  -S pneumo, Legionella, mycoplasma negative  -Continue Brovana, Pulmicort and DuoNebs  -Continue bronchopulmonary  hygiene.  Encourage use of I-S and flutter valve.  -Continue to monitor renal function and electrolytes.  Replace electrolytes as needed.  -Patient has a large right-sided lung mass without formal diagnoses.  He will likely need EBUS bronchoscopy versus Ion navigational bronchoscopy for this mass.    -Cardiology consulted..  Appreciate assistance.  -Brilinta placed on hold per cardiology for pending bronchoscopy.  IV cangrelor started.  Cangrelor to be held immediately prior to bronchoscopy and restarted after.  -Continue aspirin  -Encourage mobilization.  Out of bed to chair.  -PT/OT on board.  Appreciate assistance.    DVT prophylaxis:  Medical DVT prophylaxis orders are present.        CODE STATUS:   Level Of Support Discussed With: Patient  Code Status (Patient has no pulse and is not breathing): No CPR (Do Not Attempt to Resuscitate)  Medical Interventions (Patient has pulse or is breathing): Full Support  Comments: DNR/DNI      Electronically signed by DINA Jorge, 02/10/24, 5:23 PM EST.      This patient was seen by both a physician and a NP. I, Chaitanya Dsouza MD, spent >50% of time in accordance with split shared billing. This included personally reviewing all pertinent labs, imaging, microbiology and documentation. Also discussing the case with the patient and any available family, the admitting physician and any available ancillary staff.   Electronically signed by Chaitanya Dsouza MD, 02/11/24, 11:21 PM EST.

## 2024-02-12 NOTE — CONSULTS
Purpose of the visit was to evaluate for: goals of care/advanced care planning. Spoke with MD, RN, patient, and family and discussed palliative care, goals of care, resuscitation status, advanced care planning, and clarify code status.      Assessment:The patient is on 4MTU, on 40 liters of airvo, hob of bed slightly elevated. The patient reports pain of 6-8/10  which is chronic in his back, shoulder and neck. Primary nurse was updated and will medicate. Heart monitor in use.    Recommendations/Plan:  The patients spouse will arrive tomorrow and needs a note that reports the patient is AXOX4 and in the hospital as they are trying to close on a house. The patient is interested in starting comfort measures here at the hospital once his affairs are in order .    Tasks Completed: Code Status clarification and Emotional Support.    Other Comments: Palliative care nurse visited the patient at the bedside and discussed his code status and goals with his spouse on speaker phone. The spouse aggress with whatever the patient wants and expressed she would have difficulty at home caring for him. I reviewed comfort measures as an option and they are both in agreement and the spouse will be in tomorrow. The spouse expressed that they are trying to close on the house and she will need a place to live so they are wanting to get this done prior to starting comfort measures. The spouse also expressed it is hard for her to sleep on the chair at the hospital. Primary nurse and provider updated. At this time code is changed to NO CPR/DNI with possible compassionate withdrawal of care tomorrow. Emotional support provided.    -Malka CHOU, RN, [facility]   Palliative Care    2/12/2024 14:13 EST

## 2024-02-12 NOTE — PLAN OF CARE
Goal Outcome Evaluation:   Respiratory Therapist Broncho-Pulmonary Hygiene Progress Note      Patient Name:  Hermelindo Zuñiga  YOB: 1955    Hermelindo Zuñiga meets the qualification for Level 2 of the Bronco-Pulmonary Hygiene Protocol. This was based on my daily patient assessment and includes review of chest x-ray results, cough ability and quality, oxygenation, secretions or risk for secretion development and patient mobility.     Broncho-Pulmonary Hygiene Assessment:    Level of Movement: Actively changing positions without assistance  Alert/ oriented/ cooperative    Breath Sounds: Diminished and/or coarse rhonchi    Cough: Strong, effective and/or frequent    Chest X-Ray: Possible signs of consolidation and/or atelectasis or clear.     Sputum Productions: None or small amount of thin or watery secretions with effective cough    History and Physical: Chronic condition    SpO2 to Oxygen Need: greater than 90% on  greater than 6L, Vapotherm, oxygen mask greater than 40% or ventilator    Current SpO2 is: 92% on 15lpm     Based on this information, I have completed the following interventions: Aerobika with bronchodialtor medication or TID      Electronically signed by Moraima Hunter RRT, 02/12/24, 6:57 AM EST.

## 2024-02-12 NOTE — THERAPY EVALUATION
"Patient Name: Hermelindo Zuñiga  : 1955    MRN: [id number]                              Today's Date: 2024       Admit Date: 2024    Visit Dx:     ICD-10-CM ICD-9-CM   1. Malignant neoplasm of right lung, unspecified part of lung  C34.91 162.9   2. Hypoxia  R09.02 799.02   3. Decreased activities of daily living (ADL)  Z78.9 V49.89     Patient Active Problem List   Diagnosis    STEMI (ST elevation myocardial infarction)    Severe malnutrition    Lung mass    Tobacco abuse    Chronic obstructive pulmonary disease    Pleural effusion    Hypoxia    CAD S/P percutaneous coronary angioplasty     Past Medical History:   Diagnosis Date    Arthritis     COPD (chronic obstructive pulmonary disease)     Heart attack     Hypertension      Past Surgical History:   Procedure Laterality Date    CARDIAC CATHETERIZATION N/A 2023    Procedure: Left Heart Cath;  Surgeon: Jeancarlos White MD;  Location: [facility] CATH INVASIVE LOCATION;  Service: Cardiovascular;  Laterality: N/A;    CARDIAC CATHETERIZATION N/A 2023    Procedure: Percutaneous Coronary Intervention;  Surgeon: Jeancarlos White MD;  Location: [facility] CATH INVASIVE LOCATION;  Service: Cardiovascular;  Laterality: N/A;      General Information       Row Name 24 0728          OT Time and Intention    Document Type evaluation  -AV     Mode of Treatment individual therapy;occupational therapy  -AV       Row Name 24 0728          General Information    Patient Profile Reviewed yes  -AV     Prior Level of Function independent:;ADL's;transfer  Independent basic ADLs/ transfers. sponge bathes while seated. sits to groom (no teeth). standard commode. ambulates with rollator, which he reports \"gets away from me\" and has fallen. no home O2.  -AV     Existing Precautions/Restrictions fall;oxygen therapy device and L/min  DNR  -AV     Barriers to Rehab none identified  -AV       Row Name 24 0728          Occupational Profile    Reason for " Services/Referral (Occupational Profile) Patient is a 68 year old male admitted to [facility] on 2/9/24 with shortness of air and chest pain. He is currently on 4th floor/ 15L high-flow O2 (short of air & desat to 77% with minimal activity). /65. OT consulted due to recent decline in ADL/ transfer independence. No previous OT services for current condition.  -AV       Row Name 02/12/24 0728          Living Environment    People in Home spouse  -AV       Row Name 02/12/24 0728          Home Main Entrance    Number of Stairs, Main Entrance none  ramped entry  -AV       Row Name 02/12/24 0728          Cognition    Orientation Status (Cognition) --  alert, pleasant and cooperative. able to retain information and follow commands.  -AV       Row Name 02/12/24 0728          Safety Issues, Functional Mobility    Impairments Affecting Function (Mobility) balance;endurance/activity tolerance;shortness of breath;strength  -AV               User Key  (r) = Recorded By, (t) = Taken By, (c) = Cosigned By      Initials Name Provider Type    Bryan Thao OT Occupational Therapist                     Mobility/ADL's       Row Name 02/12/24 0728          Transfers    Comment, (Transfers) supine to long-sitting min assist: desat to 77% therefore further transfers deferred. rebounded to 89% with return to high-Arango's/ focused breathing.  -AV       Row Name 02/12/24 0728          Activities of Daily Living    BADL Assessment/Intervention --  Independent feeding and grooming with setup in bed. Mod assist bathing/ dressing. Independent use of urinal in bed/ no BM during hospitalization. Rest breaks required for all ADL task completion.  -AV               User Key  (r) = Recorded By, (t) = Taken By, (c) = Cosigned By      Initials Name Provider Type    Bryan Thao OT Occupational Therapist                   Obj/Interventions       Row Name 02/12/24 0728          Sensory Assessment (Somatosensory)    Sensory  Assessment (Somatosensory) UE sensation intact  -AV       Row Name 02/12/24 0728          Vision Assessment/Intervention    Visual Impairment/Limitations WFL  -AV     Vision Assessment Comment glasses  -AV       Row Name 02/12/24 0728          Range of Motion Comprehensive    General Range of Motion upper extremity range of motion deficits identified  -AV     Comment, General Range of Motion bilateral active shoulder flexion <90  -AV       Row Name 02/12/24 0728          Strength Comprehensive (MMT)    Comment, General Manual Muscle Testing (MMT) Assessment 4(-)/5 bilateral biceps, triceps and   -AV       Row Name 02/12/24 0728          Motor Skills    Motor Skills coordination;functional endurance  -AV     Coordination --  right dominant- diminished prior to onset  -AV     Functional Endurance poor  -AV       Row Name 02/12/24 0728          Balance    Balance Assessment sitting static balance  -AV     Static Sitting Balance independent  -AV     Comment, Balance further testing deferred: SOA/ desat with position change to sitting  -AV               User Key  (r) = Recorded By, (t) = Taken By, (c) = Cosigned By      Initials Name Provider Type    AV Bryan Palma OT Occupational Therapist                   Goals/Plan       Row Name 02/12/24 0728          Transfer Goal 1 (OT)    Activity/Assistive Device (Transfer Goal 1, OT) transfers, all;walker, rolling  -AV     [place] Level/Cues Needed (Transfer Goal 1, OT) modified independence  -AV     Time Frame (Transfer Goal 1, OT) long term goal (LTG);10 days  -AV       Row Name 02/12/24 0728          Bathing Goal 1 (OT)    Activity/Device (Bathing Goal 1, OT) bathing skills, all  -AV     [place] Level/Cues Needed (Bathing Goal 1, OT) modified independence  -AV     Time Frame (Bathing Goal 1, OT) long term goal (LTG);10 days  -AV       Row Name 02/12/24 0728          Dressing Goal 1 (OT)    Activity/Device (Dressing Goal 1, OT) dressing skills, all  -AV      [place]/Cues Needed (Dressing Goal 1, OT) modified independence  -AV     Time Frame (Dressing Goal 1, OT) long term goal (LTG);10 days  -AV       Row Name 02/12/24 0728          Toileting Goal 1 (OT)    Activity/Device (Toileting Goal 1, OT) toileting skills, all;raised toilet seat  -AV     [place] Level/Cues Needed (Toileting Goal 1, OT) modified independence  -AV     Time Frame (Toileting Goal 1, OT) long term goal (LTG);10 days  -AV       Row Name 02/12/24 0728          Grooming Goal 1 (OT)    Activity/Device (Grooming Goal 1, OT) grooming skills, all  -AV     [place] (Grooming Goal 1, OT) modified independence  unsupported sitting  -AV     Time Frame (Grooming Goal 1, OT) long term goal (LTG);10 days  -AV       Row Name 02/12/24 0728          Strength Goal 1 (OT)    Strength Goal 1 (OT) Patient will demonstrate 4/5 bilateral upper extremities to increase ADL/ transfer independence.  -AV     Time Frame (Strength Goal 1, OT) long term goal (LTG);10 days  -AV       Row Name 02/12/24 0728          Problem Specific Goal 1 (OT)    Problem Specific Goal 1 (OT) Patient will demonstrate fair endurance/ activity tolerance needed to support ADLs.  -AV     Time Frame (Problem Specific Goal 1, OT) long term goal (LTG);10 days  -AV       Row Name 02/12/24 0728          Therapy Assessment/Plan (OT)    Planned Therapy Interventions (OT) activity tolerance training;BADL retraining;functional balance retraining;occupation/activity based interventions;patient/caregiver education/training;strengthening exercise;transfer/mobility retraining  -AV               User Key  (r) = Recorded By, (t) = Taken By, (c) = Cosigned By      Initials Name Provider Type    Bryan Thao, OT Occupational Therapist                   Clinical Impression       Row Name 02/12/24 0728          Pain Assessment    Pretreatment Pain Rating 0/10 - no pain  -AV     Posttreatment Pain Rating 0/10 - no pain  -AV       Row Name 02/12/24 0728           Plan of Care Review    Plan of Care Reviewed With patient;spouse  -AV     Progress no change  first session: evaluation  -AV     Outcome Evaluation Patient presents with limitations of balance, strength and endurance/ activity tolerance which are impacting ADL/ transfer independence. Skilled OT is indicated to remediate/ compensate for deficits to maximize independence and safety with functional tasks.  -AV       Row Name 02/12/24 0728          Therapy Assessment/Plan (OT)    Patient/Family Therapy Goal Statement (OT) none stated  -AV     Rehab Potential (OT) good, to achieve stated therapy goals  -AV     Criteria for Skilled Therapeutic Interventions Met (OT) yes;meets criteria;skilled treatment is necessary  -AV     Therapy Frequency (OT) 5 times/wk  -AV       Row Name 02/12/24 0728          Therapy Plan Review/Discharge Plan (OT)    Equipment Needs Upon Discharge (OT) walker, rolling;commode chair  -AV     Anticipated Discharge Disposition (OT) sub acute care setting  -AV       Row Name 02/12/24 0728          Vital Signs    O2 Delivery Pre Treatment hi-flow  -AV     O2 Delivery Intra Treatment hi-flow  -AV     O2 Delivery Post Treatment hi-flow  -AV       Row Name 02/12/24 0728          Positioning and Restraints    Pre-Treatment Position in bed  -AV     Post Treatment Position bed  -AV     In Bed call light within reach;encouraged to call for assist;exit alarm on;with family/caregiver  -AV               User Key  (r) = Recorded By, (t) = Taken By, (c) = Cosigned By      Initials Name Provider Type    Bryan Thao, OT Occupational Therapist                   Outcome Measures       Row Name 02/12/24 0955          How much help from another is currently needed...    Putting on and taking off regular lower body clothing? 2  -AV     Bathing (including washing, rinsing, and drying) 2  -AV     Toileting (which includes using toilet bed pan or urinal) 2  -AV     Putting on and taking off regular upper body  clothing 3  -AV     Taking care of personal grooming (such as brushing teeth) 4  -AV     Eating meals 4  -AV     AM-PAC 6 Clicks Score (OT) 17  -AV       Row Name 02/12/24 0955          Functional Assessment    Outcome Measure Options AM-PAC 6 Clicks Daily Activity (OT);Optimal Instrument  -AV       Row Name 02/12/24 0955          Optimal Instrument    Optimal Instrument Optimal - 3  -AV     Bending/Stooping 2  -AV     Standing 5  -AV     Reaching 2  -AV     From the list, choose the 3 activities you would most like to be able to do without any difficulty Bending/stooping;Standing;Reaching  -AV     Total Score Optimal - 3 9  -AV               User Key  (r) = Recorded By, (t) = Taken By, (c) = Cosigned By      Initials Name Provider Type    Bryan Thao OT Occupational Therapist                    Occupational Therapy Education       Title: PT OT SLP Therapies (In Progress)       Topic: Occupational Therapy (In Progress)       Point: ADL training (Done)       Description:   Instruct learner(s) on proper safety adaptation and remediation techniques during self care or transfers.   Instruct in proper use of assistive devices.                  Learning Progress Summary             Patient Acceptance, E, VU by AV at 2/12/2024 0955   Significant Other Acceptance, E, VU by AV at 2/12/2024 0955                         Point: Home exercise program (Done)       Description:   Instruct learner(s) on appropriate technique for monitoring, assisting and/or progressing therapeutic exercises/activities.                  Learning Progress Summary             Patient Acceptance, E, VU by AV at 2/12/2024 0955   Significant Other Acceptance, E, VU by AV at 2/12/2024 0955                         Point: Precautions (Done)       Description:   Instruct learner(s) on prescribed precautions during self-care and functional transfers.                  Learning Progress Summary             Patient Acceptance, E, VU by AV at 2/12/2024  0955   Significant Other Acceptance, E, VU by AV at 2/12/2024 0955                         Point: Body mechanics (Not Started)       Description:   Instruct learner(s) on proper positioning and spine alignment during self-care, functional mobility activities and/or exercises.                  Learner Progress:  Not documented in this visit.                              User Key       Initials Effective Dates Name Provider Type Discipline    AV 06/16/21 -  Bryan Palma, OT Occupational Therapist OT                  OT Recommendation and Plan  Planned Therapy Interventions (OT): activity tolerance training, BADL retraining, functional balance retraining, occupation/activity based interventions, patient/caregiver education/training, strengthening exercise, transfer/mobility retraining  Therapy Frequency (OT): 5 times/wk  Plan of Care Review  Plan of Care Reviewed With: patient, spouse  Progress: no change (first session: evaluation)  Outcome Evaluation: Patient presents with limitations of balance, strength and endurance/ activity tolerance which are impacting ADL/ transfer independence. Skilled OT is indicated to remediate/ compensate for deficits to maximize independence and safety with functional tasks.     Time Calculation:   Evaluation Complexity (OT)  Review Occupational Profile/Medical/Therapy History Complexity: expanded/moderate complexity  Assessment, Occupational Performance/Identification of Deficit Complexity: 3-5 performance deficits  Clinical Decision Making Complexity (OT): detailed assessment/moderate complexity  Overall Complexity of Evaluation (OT): moderate complexity     Time Calculation- OT       Row Name 02/12/24 0728             Time Calculation- OT    OT Received On 02/12/24  -AV      OT Goal Re-Cert Due Date 02/21/24  -AV         Untimed Charges    OT Eval/Re-eval Minutes 35  -AV         Total Minutes    Untimed Charges Total Minutes 35  -AV       Total Minutes 35  -AV                User  Key  (r) = Recorded By, (t) = Taken By, (c) = Cosigned By      Initials Name Provider Type    AV Bryan Palma OT Occupational Therapist                  Therapy Charges for Today       Code Description Service Date Service Provider Modifiers Qty    [id number] HC OT EVAL MOD COMPLEXITY 3 2/12/2024 Bryan Palma OT GO 1                 Bryan Palma OT  2/12/2024

## 2024-02-12 NOTE — PROGRESS NOTES
Pulmonary / Critical Care Progress Note      Patient Name: Hermelindo Zuñiga  : 1955  MRN: [id number]  Primary Care Physician:  Antony Florence MD  Date of admission: 2024    Subjective   Subjective   Follow-up for pleural effusion, lung mass, shortness of breath    No acute events overnight.    This morning,  Lying in bed  Worsening dyspnea  Oxygen demand increased  Currently on 15 L HFNC  Transitioning to Airvo  Continues to have congested cough  BP soft overnight but taken in right arm  No fever or chills  Denies chest pain or hemoptysis  Remains weak and fatigued    Review of Systems  General: Fatigue, otherwise denied complaints  HEENT: Denied complaints  Respiratory: Dyspnea, cough, otherwise denied complaints  Cardiovascular: Denied complaints  GI: Denied complaints  : Denied complaints  MSK: Denied complaints      Objective   Objective     Vitals:   Temp:  [97.3 °F (36.3 °C)-97.9 °F (36.6 °C)] 97.9 °F (36.6 °C)  Heart Rate:  [] 92  Resp:  [18-22] 20  BP: ()/(38-77) 119/65  Flow (L/min):  [9-55] 55    Physical Exam   Vital Signs Reviewed   General: Chronically ill-appearing, elderly male, alert, mild distress, lying in bed  Chest:  Decreased aeration R>L, rhonchi to auscultation bilaterally, increased work of breathing noted, pursed lip breathing noted   CV: RRR, no MGR, pulses 2+, equal.  EXT:  no clubbing, no cyanosis, no edema  Neuro:  A&Ox3, CN grossly intact, no focal deficits.  Skin: No rashes or lesions noted      Result Review    Result Review:  I have personally reviewed the results from the time of this admission to 2024 12:11 EST and agree with these findings:  [x]  Laboratory  [x]  Microbiology  [x]  Radiology  [x]  EKG/Telemetry   []  Cardiology/Vascular   []  Pathology  []  Old records  []  Other:  Most notable findings include:   Lactate 2.5  Procalcitonin 12.17 --> 13.23     underwent right-sided thoracentesis with 1300 mL of serosanguineous pleural  fluid drained, exudative per lights criteria, cultures NGTD, cytology pending        Lab 02/12/24  0446 02/11/24  0508 02/10/24  0532 02/09/24  1733 02/09/24  1251   WBC  --   --  5.57  --  7.03   HEMOGLOBIN  --   --  10.5*  --  10.5*   HEMATOCRIT  --   --  31.9*  --  31.1*   PLATELETS  --   --  349  --  353   SODIUM 129* 126* 127*  --  128*   SODIUM, ARTERIAL  --   --   --   --  128.7*   POTASSIUM 4.0 3.3* 3.4*  --  3.7   CHLORIDE 95* 92* 95*  --  94*   CO2 22.0 22.1 21.0*  --  23.5   BUN 5* 7* 11  --  9   CREATININE 0.39* 0.43* 0.40*  --  0.43*   GLUCOSE 90 85 77  --  91   GLUCOSE, ARTERIAL  --   --   --   --  85   CALCIUM 8.7 8.5* 8.7  --  8.1*   TOTAL PROTEIN  --   --  6.0 6.5 6.2   ALBUMIN  --   --  2.9*  --  3.1*   GLOBULIN  --   --  3.1  --  3.1     CT Chest With Contrast Diagnostic    Result Date: 2/9/2024  PROCEDURE: CT CHEST W CONTRAST DIAGNOSTIC  COMPARISON:  [facility], PET, NM PET/CT SKULL BASE TO MID THIGH, 1/26/2024, 14:44.  [facility], CR, XR CHEST 1 VW, 1/30/2024, 13:14.  [facility], CR, XR CHEST 1 VW, 2/09/2024, 13:19.  [facility], CT, CT ANGIOGRAM ABDOMEN PELVIS, 1/10/2024, 12:57.  INDICATIONS: shortness of breath, worsening lung cancer  TECHNIQUE: CT images were obtained with non-ionic intravenous contrast material.   PROTOCOL:   Pulmonary embolism imaging protocol performed    RADIATION:   DLP: 409.6 mGy*cm   Automated exposure control was utilized to minimize radiation dose. CONTRAST: 100 cc Isovue 370 I.V. LABS:   eGFR: >60 ml/min/1.73m2  FINDINGS:  The pulmonary arteries are well opacified.  No filling defects are seen.  There are no findings of PE.  The main pulmonary artery is of similar diameter to the ascending aorta, consistent with pulmonary artery hypertension.  Mediastinal windows reveal 9 cm mass in the right upper lobe with bulky hilar and confluent mediastinal adenopathy consistent with bronchogenic malignancy or lung cancer.   This was hypermetabolic on PET scan.  Cervical, mediastinal, and upper abdominal adenopathy is again seen, which was also hypermetabolic on PET scan.  The superior vena cava is surrounded by bulky adenopathy, and is markedly narrowed.  Extensive coronary artery calcifications are evident.  Moderate right pleural effusion appears slightly larger.  Lung window images reveal marked emphysema.  No airspace disease or mass is seen on the left.  Heterogeneous hepatic density is consistent with hypermetabolic metastatic lesions seen on CT scan.  Numerous hypermetabolic ICS lesions were seen on PET scan, no destructive bone lesion is evident.  CONTINUED ON NEXT PAGE...        Impression:   CT scan of the chest with IV contrast demonstrating no findings of PE.  Findings consistent with advanced bronchogenic malignancy or lung cancer are again evident.  The superior vena cava is surrounded by bulky adenopathy, and is markedly narrowed.      AJ TRAN MD       Electronically Signed and Approved By: AJ TRAN MD on 2/09/2024 at 15:49                Assessment & Plan   Assessment / Plan     Active Hospital Problems:  Active Hospital Problems    Diagnosis     **Hypoxia     CAD S/P percutaneous coronary angioplasty      Impression:  Acute on chronic hypoxic respiratory failure, wears O2 as needed  Right-sided pleural effusion  9 cm right upper lobe mass with bulky hilar /confluent mediastinal adenopathy  Chronic obstructive pulmonary disease  Current tobacco abuse of cigarettes  Hyponatremia  CAD with stent stent placement 12/23    Plan:  -Transition to Airvo, continue to wean O2 to maintain SpO2 greater than 88%  -Continue Zosyn for pneumonia and sepsis, may de-escalate pending cultures  -Continue Brovana, Pulmicort and DuoNebs  -Continue bronchopulmonary hygiene.  Encourage use of I-S and flutter valve.  -Start aggressive pulmonary hygiene.  Chest percussor to right chest.  -Continue to monitor renal function and  electrolytes.  Replace electrolytes as needed.  -Patient has a large right-sided lung mass without formal diagnoses.  He will likely need EBUS bronchoscopy versus Ion navigational bronchoscopy for this mass.    -Cardiology consulted..  Appreciate assistance.  -Brilinta placed on hold per cardiology for pending bronchoscopy.  IV cangrelor started.  Cangrelor to be held immediately prior to bronchoscopy and restarted after.  -Continue aspirin  -Check D-dimer --> 1.71  -Acute bilateral upper extremity DVTs  -Start heparin drip per protocol  -Check CT chest with contrast  -Encourage mobilization.  Out of bed to chair.  -PT/OT on board.  Appreciate assistance.  -Palliative care on board.  Appreciate assistance.    Extensive discussion had with the patient today made it very clear to him that I suspect that he is dying from his lung cancer which appears to be progressive and aggressive  Recommend palliative care and potential comfort measures if the patient wishes to go this route this would not be unreasonable    DVT prophylaxis:  Medical DVT prophylaxis orders are present.        CODE STATUS:   Level Of Support Discussed With: Patient  Code Status (Patient has no pulse and is not breathing): No CPR (Do Not Attempt to Resuscitate)  Medical Interventions (Patient has pulse or is breathing): Full Support  Comments: DNR/DNI      Electronically signed by DINA Jorge, 02/12/24, 12:11 PM EST.    This visit was performed by BOTH a physician and an APC. I personally evaluated and examined the patient.  And spent more than 51% of time caring for this patient I performed all aspects of MDM as documented. , I have reviewed and confirmed the accuracy of the patient's history as documented in this note. and I have reexamined the patient and the results are consistent with the previously documented exam. I have updated the documentation as necessary    Electronically signed by Morteza Davila DO, 02/12/24, 4:59 PM  EST.

## 2024-02-12 NOTE — PLAN OF CARE
Goal Outcome Evaluation:  Plan of Care Reviewed With: patient, spouse        Progress: no change (first session: evaluation)  Outcome Evaluation: Patient presents with limitations of balance, strength and endurance/ activity tolerance which are impacting ADL/ transfer independence. Skilled OT is indicated to remediate/ compensate for deficits to maximize independence and safety with functional tasks.      Anticipated Discharge Disposition (OT): sub acute care setting

## 2024-02-12 NOTE — PROGRESS NOTES
Patient Care Team:  Antony Florence MD as PCP - General (Family Medicine)    Date: 2024  Patient Name: Hermelindo Zuñiga  : 1955  MRN: [id number]  Date of admission: 2024  Room/Bed: [facility]/1      Subjective   Subjective         Chief Complaint: f/u shortness of breath     Summary:Hermelindo Zuñiga is a 68 y.o. male with a past medical history of coronary artery disease with stents placed in 2023, lung mass that has not been biopsied at this point, diastolic heart failure, and hypertension who presents to the emergency department shortness of breath     Patient was seen on  with an ultrasound-guided right thoracentesis for pleural effusion.  700 cc were removed moved.  Patient initially felt very well but has started feeling short of breath since that time.  Of note he had a stent placed in 2023 so is on dual antiplatelet therapy.  He also has a mass in his lung that has not been biopsied and has not started treated on yet.  As result of all the symptoms he came the ER for further evaluation      In the emergency department patient's blood pressure was 81/67 on his right arm, satting 87% on 9 L of high flow.  Blood pressures taken on his left arm and this was normal.  This is showing that the lymphadenopathy is showing some hemodynamic issues with blood pressure on the right arm.    CT scan shows no PE.  Findings consistent with advanced bronchogenic malignancy or lung cancer evident.  There is concern for possible SV syndrome due to expansion of tumor and adenopathy.  Also concern for postobstructive pneumonia.  Pulmonology was consulted and they performed bedside thoracentesis.  Patient be admitted for acute hypoxic respiratory failure due to obstructive pneumonia and worsening malignancy. Patient has continued to decline. Cardiology was consulted for opinion on holding Brilinta for possible biopsy and patient was transitioned to cangrelor. Patient respiratory status  "worsening overnight and is now requiring airvo. His blood pressures have remained low and received 1L bolus with some improvement. Have ask nursing to try to get blood pressures in left arm. Patient also had doppler which showed multiple DVTs. Discussed with pulmonology and palliative care, we will start patient on heparin drip for now. Patient is DNR/DNI but wants to continue aggressive measures. He is considering transitioning to comfort measures this hospitalization, but states he has some affairs he needs to get in order.       Interval Followup:   Patient is feeling more short of breath, denies any chest pain. Patient is concerned about his heart, but tried to explain that the cancer is killing him and he likely will not make it to have repeat cardiac catheterization.       Review of Systems   Constitutional:  Positive for appetite change.   Respiratory:  Positive for shortness of breath.    Cardiovascular:  Positive for chest pain.   Gastrointestinal:  Positive for nausea and vomiting.         Objective   Objective       Vital Signs  Temp:  [97.2 °F (36.2 °C)-97.9 °F (36.6 °C)] 97.2 °F (36.2 °C)  Heart Rate:  [] 100  Resp:  [18-22] 20  BP: ()/(38-77) 119/72  Oxygen Therapy  SpO2: 92 %  Pulse Oximetry Type: Continuous  Device (Oxygen Therapy): heated, high-flow nasal cannula  $ High Flow Nasal Cannula Set-Up: yes  Flow (L/min): 50  Oxygen Concentration (%): 91  Flowsheet Rows      Flowsheet Row First Filed Value   Admission Height 182.9 cm (72\") Documented at 02/09/2024 1247   Admission Weight 54.7 kg (120 lb 9.5 oz) Documented at 02/09/2024 1247          Intake & Output (last 3 days)         02/07 0701  02/08 0700 02/08 0701  02/09 0700 02/09 0701  02/10 0700 02/10 0701  02/11 0700    P.O.   480 360    IV Piggyback   1000     Total Intake(mL/kg)   1480 (27) 360 (6.6)    Urine (mL/kg/hr)   200 400 (0.6)    Total Output   200 400    Net   +1280 -40                  Lines, Drains & Airways       " Active LDAs       Name Placement date Placement time Site Days    Peripheral IV 02/10/24 1502 Anterior;Left Forearm 02/10/24  1502  Forearm  less than 1                      Physical Exam  Vitals reviewed.   Constitutional:       Appearance: He is cachectic.      Comments: Mild distress    HENT:      Head: Normocephalic and atraumatic.      Mouth/Throat:      Mouth: Mucous membranes are moist.   Cardiovascular:      Rate and Rhythm: Normal rate.   Pulmonary:      Comments: Decreased aeration, scattered rhonchi, labored breathing   Abdominal:      General: Bowel sounds are normal.      Palpations: Abdomen is soft.   Musculoskeletal:      Right lower leg: No edema.      Left lower leg: No edema.      Comments: B/l upper extremity swelling L>R   Skin:     Comments: Ecchymosis to b/l ue    Neurological:      Mental Status: He is alert.   Psychiatric:         Mood and Affect: Mood normal.         Behavior: Behavior normal.           Results Review:      Results from last 7 days   Lab Units 02/12/24  1239 02/10/24  0532 02/09/24  1251   WBC 10*3/mm3 4.68 5.57 7.03   HEMOGLOBIN g/dL 10.5* 10.5* 10.5*   HEMATOCRIT % 31.1* 31.9* 31.1*   PLATELETS 10*3/mm3 375 349 353     Results from last 7 days   Lab Units 02/12/24  0446 02/11/24  0508 02/10/24  0532 02/09/24  1251   SODIUM mmol/L 129* 126* 127* 128*   SODIUM, ARTERIAL mmol/L  --   --   --  128.7*   POTASSIUM mmol/L 4.0 3.3* 3.4* 3.7   CHLORIDE mmol/L 95* 92* 95* 94*   CO2 mmol/L 22.0 22.1 21.0* 23.5   BUN mg/dL 5* 7* 11 9   CREATININE mg/dL 0.39* 0.43* 0.40* 0.43*   CALCIUM mg/dL 8.7 8.5* 8.7 8.1*   BILIRUBIN mg/dL  --   --  0.5 0.6   ALK PHOS U/L  --   --  108 114   ALT (SGPT) U/L  --   --  9 10   AST (SGOT) U/L  --   --  27 26   GLUCOSE mg/dL 90 85 77 91   GLUCOSE, ARTERIAL mg/dL  --   --   --  85       Results from last 7 days   Lab Units 02/11/24  0508 02/10/24  0532   MAGNESIUM mg/dL 1.6 1.8       Blood Culture   Date Value Ref Range Status   02/09/2024 No growth at  "24 hours  Preliminary       No results found for: \"MRSACX\"    No results found for: \"STOOLCX\"    No results found for: \"URINECX\"    No results found for: \"WOUNDCX\"    Imaging Results (Last 24 Hours)       Procedure Component Value Units Date/Time    CT Chest With Contrast Diagnostic [[id number]] Collected: 02/12/24 1425     Updated: 02/12/24 1428    Narrative:      PROCEDURE: CT CHEST W CONTRAST DIAGNOSTIC     COMPARISON:  [facility], PET, NM PET/CT SKULL BASE TO MID THIGH, 1/26/2024, 14:44.    [facility], CT, CT CHEST W CONTRAST DIAGNOSTIC, 2/09/2024, 15:12.  INDICATIONS: worsening hypoxia, mutlitple acute DVTs     TECHNIQUE: After obtaining the patient's consent, CT images were obtained with non-ionic   intravenous contrast material.       PROTOCOL:   Pulmonary embolism imaging protocol performed      RADIATION:   DLP: 762mGy*cm    Automated exposure control was utilized to minimize radiation dose.   CONTRAST: 100cc Isovue 370 I.V.     FINDINGS:   Soft tissues of the lower neck demonstrate prominent venous collaterals in right upper chest and   right lower neck related to severe SVC compression seen on the prior study.  Redemonstration of a   large right paratracheal and right hilar mediastinal mass consistent with known malignant   adenopathy which encases the SVC with severe SVC narrowing and near occlusion.  This abnormal soft   tissue measures up to 10 x 6 cm similar to prior study.  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  Normal contrast opacification   of the left carotid artery and left subclavian artery.     Heart size normal.  Coronary calcifications and coronary stents noted.  Negative for pulmonary   embolus.  Large mass in the right upper lobe consistent with known malignancy again noted measuring   up to the 10 x 9 cm.  Mass again extends to the right hilum.       Moderate to large right pleural " effusion similar to the prior study.  New moderate left pleural   effusion.  Severe emphysema.  No pneumothorax.  Airspace disease involving the right lower lobe and   right middle lobe likely compressive atelectasis mildly worsened from the prior study.  Mild   basilar dependent atelectasis at the left lower lobe.     Numerous osseous metastatic lesions better demonstrated on recent PET-CT.  Negative for acute   fracture.  Upper abdomen demonstrates focal avid enhancement in segment 4 consistent with quadrate   lobe sign secondary to SVC occlusion.  Hepatic metastasis better assessed on recent PET-CT.  Bulky   upper abdominal portacaval adenopathy better assessed on the recent PET-CT.          Impression:         1. Negative for pulmonary embolus.  2. Redemonstration of large malignant right upper lobe mass with extensive right paratracheal and   right hilar mediastinal adenopathy which causes near complete SVC occlusion similar to prior study.  3. Moderate to large right pleural effusion similar to prior study with increased compressive   atelectasis at the right middle and right lower lobes.  4. New moderate left-sided pleural effusion.  5. Hepatic and osseous metastases better demonstrated on recent PET-CT.  6. Additional chronic findings above.            TWILA VELEZ MD         Electronically Signed and Approved By: TWILA VELEZ MD on 2/12/2024 at 14:24                     XR Chest 1 View [[id number]] Collected: 02/11/24 1707     Updated: 02/11/24 1710    Narrative:      PROCEDURE: XR CHEST 1 VW     COMPARISON: [facility], [facility], XR CHEST 1 VW, 2/09/2024, 17:36.     INDICATIONS: hypoxia, dyspnea, pleural effusion     FINDINGS:   The heart is unchanged in size.  The lungs have a hyperexpanded appearance consistent with   emphysema.     Right upper lobe mass and mediastinal adenopathy is unchanged.     Small pleural effusions are evident.       Impression:         Emphysema.     Right upper lobe mass  and mediastinal adenopathy is unchanged.     Small bilateral pleural effusions.                  AJ TRAN MD         Electronically Signed and Approved By: AJ TRAN MD on 2/11/2024 at 17:07                             I have personally reviewed the patient's new imaging and lab results.          ECG/EMG Results (most recent)       Procedure Component Value Units Date/Time    ECG 12 Lead ED Triage Standing Order; SOA [[id number]] Collected: 02/09/24 1254     Updated: 02/09/24 1635     QTC Interval -- ms     Narrative:      HEART RATE= 78  bpm  RR Interval= 764  ms  OK Interval= 184  ms  P Horizontal Axis= -67  deg  P Front Axis= 81  deg  QRSD Interval= 119  ms  QT Interval=   ms  QTcB= Invalid  ms  QRS Axis= 90  deg  T Wave Axis= 83  deg  - ABNORMAL ECG -  Sinus rhythm  Nonspecific intraventricular conduction delay  Probable anterolateral infarct, age indeterm  When compared with ECG of 10-Joshua-2024 11:52:59,  New or worsened ischemia or infarction  Electronically Signed By: Hung Banks ([facility]) 09-Feb-2024 16:34:53  Date and Time of Study: 2024-02-09 12:54:27            Results for orders placed during the hospital encounter of 12/25/23    Adult Transthoracic Echo Complete W/ Cont if Necessary Per Protocol    Interpretation Summary    Left ventricular ejection fraction appears to be 51 - 55%.    Left ventricular diastolic function is consistent with (grade I) impaired relaxation and age.    Estimated right ventricular systolic pressure from tricuspid regurgitation is moderately elevated (45-55 mmHg).    Mild to moderate tricuspid regurgitation.          Medication Review:     Scheduled Meds:arformoterol, 15 mcg, Nebulization, BID - RT  aspirin, 81 mg, Oral, Daily  atorvastatin, 40 mg, Oral, Nightly  budesonide, 0.5 mg, Nebulization, BID - RT  gabapentin, 600 mg, Oral, Q8H  heparin, 70 Units/kg, Intravenous, Once  ipratropium-albuterol, 3 mL, Nebulization, Q4H - RT  piperacillin-tazobactam, 3.375 g,  Intravenous, Q8H  sodium chloride, 1,000 mL, Intravenous, Once  sodium chloride, 10 mL, Intravenous, Q12H      Continuous Infusions:cangrelor 50 mg in 250 ml (200 mcg/ml) IV infusion, 0.75 mcg/kg/min, Last Rate: 0.75 mcg/kg/min (02/11/24 9917)  heparin, 18 Units/kg/hr, Last Rate: 18 Units/kg/hr (02/12/24 1503)  Pharmacy to Dose Zosyn,       PRN Meds:.  acetaminophen    heparin    heparin    HYDROcodone-acetaminophen    Morphine **AND** naloxone    Pharmacy to Dose Zosyn    sodium chloride    sodium chloride    sodium chloride      I have reviewed the patient's current medication list    Assessment & Plan   Assessment / Plan       Active Hospital Problems    Diagnosis  POA    **Hypoxia [R09.02]  Yes    CAD S/P percutaneous coronary angioplasty [I25.10, Z98.61]  Not Applicable       Acute hypoxic respiratory failure  Postobstructive pneumonia with risk for Pseudomonas and MRSA  Right sided pleural effusion s/p thoracentesis 2/9/24  Coronary artery disease with stent placed in December 2023 on dual antiplatelet therapy  Severe protein calorie malnutrition  Hyponatremia due to SIADH and lung cancer  Hypokalemia   Acute DVTs of the right internal jugular, left internal jugular, and left subclavian    Plan:  -- Admit to hospital service  -- pulmonary and cardiology following   -- Patient's blood pressure on his right arm is dramatically different than his left arm due to the malignancy.  Will need to watch for flushing and dramatic changes in blood pressure on the left arm due to adenopathy and malignancy spread to the SVC  - s/p right sided thoracentesis 2/9, exudative   - patient still requiring 9L HFNC  -- Blood culture- NGTD  -- MRSA, Strep and Legionella urine antigen- negative   -- dc Vancomycin, continue Zosyn  -- patient given hyaluronidase d/t Vancomycin infiltration  -- Brovana/Pulmicort/DuoNebs  -- Consult dietitian due to severe protein calorie malnutrition  -- Repeat lactic acid wnl   -- replaced potassium po    -- cardiology following and Brilinta being held, continue aspirin 81mg. Started on IV cangrelor and will continue for now and turn off just prior to biopsy.   -- Discussed with pulm and patient's condition is worsening, likely will not even be able to make it for biopsy  -- started on heparin drip for now since patient wants all aggressive measures      DVT Prophylaxis  - enoxaparin     Code Status  - DNR/DNI    Patient is high risk for further decompensation and prognosis is poor. Discussed with palliative care and patient will be DNR/DNI with aggressive measures for now, considering comfort measures this hospitalization. Discussed with patient's wife at bedside this am.       Plan for disposition: pending progress        Karey Llanos, DO  02/12/24  16:33 EST          Note disclaimer: At [facility], we believe that sharing information builds trust and better relationships. You are receiving this note because you recently visited [facility]. It is possible you will see health information before a provider has talked with you about it. This kind of information can be easy to misunderstand. To help you fully understand what it means for your health, we urge you to discuss this note with your provider.

## 2024-02-12 NOTE — PLAN OF CARE
Goal Outcome Evaluation:  Plan of Care Reviewed With: patient           Outcome Evaluation: Pt presents with decreased strength, transfers and functional mobility. Will benefit from inpatient PT services and continued PT services upon discharge.      Anticipated Discharge Disposition (PT): inpatient rehabilitation facility

## 2024-02-12 NOTE — THERAPY EVALUATION
Acute Care - Physical Therapy Initial Evaluation   Kelly     Patient Name: Hermelindo Zuñiga  : 1955  MRN: [id number]  Today's Date: 2024      Visit Dx:     ICD-10-CM ICD-9-CM   1. Malignant neoplasm of right lung, unspecified part of lung  C34.91 162.9   2. Hypoxia  R09.02 799.02   3. Decreased activities of daily living (ADL)  Z78.9 V49.89   4. Difficulty walking  R26.2 719.7     Patient Active Problem List   Diagnosis    STEMI (ST elevation myocardial infarction)    Severe malnutrition    Lung mass    Tobacco abuse    Chronic obstructive pulmonary disease    Pleural effusion    Hypoxia    CAD S/P percutaneous coronary angioplasty     Past Medical History:   Diagnosis Date    Arthritis     COPD (chronic obstructive pulmonary disease)     Heart attack     Hypertension      Past Surgical History:   Procedure Laterality Date    CARDIAC CATHETERIZATION N/A 2023    Procedure: Left Heart Cath;  Surgeon: Jeancarlos White MD;  Location: [facility] INVASIVE LOCATION;  Service: Cardiovascular;  Laterality: N/A;    CARDIAC CATHETERIZATION N/A 2023    Procedure: Percutaneous Coronary Intervention;  Surgeon: Jeancarlos White MD;  Location: [facility] CATH INVASIVE LOCATION;  Service: Cardiovascular;  Laterality: N/A;     PT Assessment (last 12 hours)       PT Evaluation and Treatment       Row Name 24 1400          Physical Therapy Time and Intention    Subjective Information no complaints  -DP     Document Type evaluation  -DP     Mode of Treatment individual therapy;physical therapy  -DP     Patient Effort good  -DP       Row Name 24 1400          General Information    Patient Profile Reviewed yes  -DP     Patient Observations alert;cooperative  -DP     Prior Level of Function independent:;gait;transfer;bed mobility;ADL's  -DP     Equipment Currently Used at Home rollator  -DP     Existing Precautions/Restrictions fall  -DP     Barriers to Rehab none identified  -DP       Row Name 24  1400          Living Environment    Current Living Arrangements home  -DP     People in Home spouse  -DP       Row Name 02/12/24 1400          Home Use of Assistive/Adaptive Equipment    Equipment Currently Used at Home walker, rolling  -DP       Row Name 02/12/24 1400          Range of Motion (ROM)    Range of Motion bilateral lower extremities;ROM is WFL  -DP       Row Name 02/12/24 1400          Strength Comprehensive (MMT)    General Manual Muscle Testing (MMT) Assessment lower extremity strength deficits identified  -DP     Comment, General Manual Muscle Testing (MMT) Assessment BLE: 4/5  -DP       Row Name 02/12/24 1400          Bed Mobility    Bed Mobility supine-sit-supine  -DP     Supine-Sit-Supine [place] (Bed Mobility) minimum assist (75% patient effort)  -DP     Comment, (Bed Mobility) Pt desats very easily  -DP       Row Name 02/12/24 1400          Transfers    Transfers sit-stand transfer  -DP       Row Name 02/12/24 1400          Sit-Stand Transfer    Sit-Stand [place] (Transfers) contact guard  -DP     Comment, (Sit-Stand Transfer) desat to 74% ambulation deferred  -DP       Row Name 02/12/24 1400          Safety Issues, Functional Mobility    Impairments Affecting Function (Mobility) balance;endurance/activity tolerance;shortness of breath;strength  -DP       Row Name 02/12/24 1400          Balance    Balance Assessment standing static balance  -DP     Static Sitting Balance contact guard  -DP       Row Name             [REMOVED] Wound 02/09/24 1939 sacral spine    Wound - Properties Group Placement Date: 02/09/24  -JM Placement Time: 1939 -JM Location: sacral spine  -JM Removal Date: 02/12/24  -VIKAS Removal Time: 1338 -JP Wound Outcome: Other  -VIKAS, blanchable and intact. moo, rn, wcc     Retired Wound - Properties Group Placement Date: 02/09/24  -JM Placement Time: 1939 -JM Location: sacral spine  -JM Removal Date: 02/12/24  -VIKAS Removal Time: 1338 -JP Wound Outcome: Other  -VIKAS,  blanchable and intact. ros cortés, wagner     Retired Wound - Properties Group Date first assessed: 02/09/24  - Time first assessed: 1939 -JM Location: sacral spine  -JM Resolution Date: 02/12/24  -VIKAS Resolution Time: 1338 -VIKAS Wound Outcome: Other  -VIKAS, blanchable and intact. jmp, rn, tank       Row Name             [REMOVED] Wound 12/26/23 2315 Right anterior greater trochanter Incision    Wound - Properties Group Placement Date: 12/26/23  -SK Placement Time: 2315  -SK Side: Right  -SK Orientation: anterior  -SK Location: greater trochanter  -SK Primary Wound Type: Incision  -SK Removal Date: 02/12/24  -VIKAS Removal Time: 1338 -VIKAS Wound Outcome: Healed  -VIKAS    Retired Wound - Properties Group Placement Date: 12/26/23  -SK Placement Time: 2315  -SK Side: Right  -SK Orientation: anterior  -SK Location: greater trochanter  -SK Primary Wound Type: Incision  -SK Removal Date: 02/12/24  -VIKAS Removal Time: 1338 -VIKAS Wound Outcome: Healed  -VKIAS    Retired Wound - Properties Group Date first assessed: 12/26/23  -SK Time first assessed: 2315  -SK Side: Right  -SK Location: greater trochanter  -SK Primary Wound Type: Incision  -SK Resolution Date: 02/12/24  -VIKAS Resolution Time: 1338 -VIKAS Wound Outcome: Healed  -VIKAS      Row Name 02/12/24 1400          Plan of Care Review    Plan of Care Reviewed With patient  -DP     Outcome Evaluation Pt presents with decreased strength, transfers and functional mobility. Will benefit from inpatient PT services and continued PT services upon discharge.  -DP       Row Name 02/12/24 1400          Therapy Assessment/Plan (PT)    Rehab Potential (PT) good, to achieve stated therapy goals  -DP     Criteria for Skilled Interventions Met (PT) yes;meets criteria  -DP     Therapy Frequency (PT) daily  -DP     Predicted Duration of Therapy Intervention (PT) 10 days  -DP     Problem List (PT) problems related to;mobility  -DP     Activity Limitations Related to Problem List (PT) unable to transfer  safely;unable to ambulate safely  -DP       Row Name 02/12/24 1400          PT Evaluation Complexity    History, PT Evaluation Complexity no personal factors and/or comorbidities  -DP     Examination of Body Systems (PT Eval Complexity) total of 4 or more elements  -DP     Clinical Presentation (PT Evaluation Complexity) stable  -DP     Clinical Decision Making (PT Evaluation Complexity) low complexity  -DP     Overall Complexity (PT Evaluation Complexity) low complexity  -DP       Row Name 02/12/24 1400          Physical Therapy Goals    Transfer Goal Selection (PT) transfer, PT goal 1  -DP     Gait Training Goal Selection (PT) gait training, PT goal 1  -DP       Row Name 02/12/24 1400          Transfer Goal 1 (PT)    Activity/Assistive Device (Transfer Goal 1, PT) sit-to-stand/stand-to-sit  -DP     [place] Level/Cues Needed (Transfer Goal 1, PT) supervision required  -DP     Time Frame (Transfer Goal 1, PT) 10 days  -DP       Row Name 02/12/24 1400          Gait Training Goal 1 (PT)    Activity/Assistive Device (Gait Training Goal 1, PT) assistive device use;walker, rolling  -DP     [place] Level (Gait Training Goal 1, PT) supervision required  -DP     Distance (Gait Training Goal 1, PT) 200  -DP     Time Frame (Gait Training Goal 1, PT) 10 days  -DP               User Key  (r) = Recorded By, (t) = Taken By, (c) = Cosigned By      Initials Name Provider Type    Dorothea Singletary, RN Registered Nurse    Kya Kauffman RN Registered Nurse    Pino Orozco, PT Physical Therapist    Vivek Babcock RN Registered Nurse                      PT Recommendation and Plan  Anticipated Discharge Disposition (PT): inpatient rehabilitation facility  Planned Therapy Interventions (PT): balance training, bed mobility training, gait training, strengthening, transfer training  Therapy Frequency (PT): daily  Plan of Care Reviewed With: patient  Outcome Evaluation: Pt presents with decreased strength,  transfers and functional mobility. Will benefit from inpatient PT services and continued PT services upon discharge.   Outcome Measures       Row Name 02/12/24 1400             How much help from another person do you currently need...    Turning from your back to your side while in flat bed without using bedrails? 4  -DP      Moving from lying on back to sitting on the side of a flat bed without bedrails? 3  -DP      Moving to and from a bed to a chair (including a wheelchair)? 3  -DP      Standing up from a chair using your arms (e.g., wheelchair, bedside chair)? 3  -DP      Climbing 3-5 steps with a railing? 2  -DP      To walk in hospital room? 2  -DP      AM-PAC 6 Clicks Score (PT) 17  -DP      Highest Level of Mobility Goal 5 --> Static standing  -DP         Functional Assessment    Outcome Measure Options AM-PAC 6 Clicks Basic Mobility (PT)  -DP                User Key  (r) = Recorded By, (t) = Taken By, (c) = Cosigned By      Initials Name Provider Type    Pino Orozco, PT Physical Therapist                     Time Calculation:    PT Charges       Row Name 02/12/24 1457             Time Calculation    PT Received On 02/12/24  -DP      PT Goal Re-Cert Due Date 02/21/24  -DP         Untimed Charges    PT Eval/Re-eval Minutes 40  -DP         Total Minutes    Untimed Charges Total Minutes 40  -DP       Total Minutes 40  -DP                User Key  (r) = Recorded By, (t) = Taken By, (c) = Cosigned By      Initials Name Provider Type    Pino Orozco, PT Physical Therapist                      PT G-Codes  Outcome Measure Options: AM-PAC 6 Clicks Basic Mobility (PT)  AM-PAC 6 Clicks Score (PT): 17  AM-PAC 6 Clicks Score (OT): 17    Pino Gabriel PT  2/12/2024

## 2024-02-12 NOTE — SIGNIFICANT NOTE
Wound Eval / Progress Noted     Kelly     Patient Name: Hermelindo Zuñiga  : 1955  MRN: [id number]  Today's Date: 2024                 Admit Date: 2024    Visit Dx:    ICD-10-CM ICD-9-CM   1. Malignant neoplasm of right lung, unspecified part of lung  C34.91 162.9   2. Hypoxia  R09.02 799.02   3. Decreased activities of daily living (ADL)  Z78.9 V49.89         Hypoxia    CAD S/P percutaneous coronary angioplasty        Past Medical History:   Diagnosis Date    Arthritis     COPD (chronic obstructive pulmonary disease)     Heart attack     Hypertension         Past Surgical History:   Procedure Laterality Date    CARDIAC CATHETERIZATION N/A 2023    Procedure: Left Heart Cath;  Surgeon: Jeancarlos White MD;  Location: [facility] CATH INVASIVE LOCATION;  Service: Cardiovascular;  Laterality: N/A;    CARDIAC CATHETERIZATION N/A 2023    Procedure: Percutaneous Coronary Intervention;  Surgeon: Jeancarlos White MD;  Location: [facility] CATH INVASIVE LOCATION;  Service: Cardiovascular;  Laterality: N/A;         Physical Assessment:         24 1115   Skin   Skin WDL X;color;characteristics;all   Skin Color/Characteristics redness blanchable;other (see comments)  (buttocks)   Skin Temperature warm   Skin Moisture dry   Skin Elasticity quick return to original state   Skin Integrity intact     Wound Check / Follow-up:  Patient seen today for a wound consult. Patient yelling for help upon entry into the patients room; patient reports that he is having difficulty breathing; high flow nasal cannula in place. Oxygen saturation on the continuous pulse ox reading 75%; primary RN made aware and requested to come to patients room. Assisted the patient in an optimal upright sitting position.     Buttocks was later assessed; tissue is intact and blanchable with some redness. Healing from previous injury, no open wounds at this time. Cleansed with NS and gauze. Applied blue top moisture barrier and left open  to air. Recommending application of the blue top barrier cream TID / PRN and leave open to air. Implement Q2H turns and keep patient clean and free from all moisture.    Impression: blanchable redness     Short term goals:  regain skin integrity, skin protection, topical treatment, pressure reduction, moisture prevention    Dorothea Gloria RN    2/12/2024    13:40 EST

## 2024-02-12 NOTE — PLAN OF CARE
Goal Outcome Evaluation:  Plan of Care Reviewed With: patient        Progress: improving            Patient blood pressure low, Dr. Malik contacted and at bedside. See MAR. Will continue with POC.

## 2024-02-13 LAB
FUNGUS WND CULT: NORMAL
MYCOBACTERIUM SPEC CULT: NORMAL
NIGHT BLUE STAIN TISS: NORMAL

## 2024-02-13 NOTE — NURSING NOTE
The patient is on 4mtu, on 55 liters of airvo, sitting up in bed. Reports SOA, no anxiety and has been medicated for pain. The patient is expecting someone to bring him legal documents today to sign and will also be calling family and friends in today to say his goodbyes prior to starting comfort measures. Spouse is at bedside. All questions have been answered and it was explained again the patients medical condition and the inability to wean his oxygen down to safely leave the hospital due to his cancer. The compassionate withdrawal of care was explained again and emotional support provided. Primary nurse updated.     -Malka CHOU, RN, [facility]   Palliative Care    2/13/2024 10:41 EST

## 2024-02-13 NOTE — PROGRESS NOTES
Pulmonary / Critical Care Progress Note      Patient Name: Hermelindo Zuñiga  : 1955  MRN: [id number]  Primary Care Physician:  Antony Florence MD  Date of admission: 2024    Subjective   Subjective   Follow-up for pleural effusion, lung mass, shortness of breath    No acute events overnight.    This morning,  Resting in bed  Wife at bedside  Worsening dyspnea  Oxygen demand increased  Currently on Airvo 55 L / 91% SpO2  Remains very dyspneic  Continues to have congested cough  No fever or chills  Denies chest pain or hemoptysis  Remains weak and fatigued    Review of Systems  General: Fatigue, otherwise denied complaints  HEENT: Denied complaints  Respiratory: Dyspnea, cough, otherwise denied complaints  Cardiovascular: Denied complaints  GI: Denied complaints  : Denied complaints  MSK: Denied complaints      Objective   Objective     Vitals:   Temp:  [97 °F (36.1 °C)-98 °F (36.7 °C)] 97.7 °F (36.5 °C)  Heart Rate:  [] 87  Resp:  [18-22] 18  BP: ()/(56-84) 109/84  Flow (L/min):  [50-55] 55    Physical Exam   Vital Signs Reviewed   General: Chronically ill-appearing, elderly male, alert, mild distress, lying in bed  Chest:  Decreased aeration R>L, scattered rhonchi to auscultation bilaterally, increased work of breathing noted, pursed lip breathing noted   CV: RRR, no MGR, pulses 2+, equal.  EXT:  no clubbing, no cyanosis, no edema  Neuro:  A&Ox3, CN grossly intact, no focal deficits.  Skin: No rashes or lesions noted      Result Review    Result Review:  I have personally reviewed the results from the time of this admission to 2024 15:27 EST and agree with these findings:  [x]  Laboratory  [x]  Microbiology  [x]  Radiology  [x]  EKG/Telemetry   []  Cardiology/Vascular   []  Pathology  []  Old records  []  Other:  Most notable findings include:   Lactate 2.5  Procalcitonin 12.17 --> 13.23     underwent right-sided thoracentesis with 1300 mL of serosanguineous pleural fluid  drained, exudative per lights criteria, cultures NGTD, cytology pending        Lab 02/13/24  0205 02/12/24  1239 02/12/24  0446 02/11/24  0508 02/10/24  0532 02/09/24  1733 02/09/24  1251   WBC 4.35 4.68  --   --  5.57  --  7.03   HEMOGLOBIN 9.7* 10.5*  --   --  10.5*  --  10.5*   HEMATOCRIT 28.8* 31.1*  --   --  31.9*  --  31.1*   PLATELETS 366 375  --   --  349  --  353   SODIUM 128*  --  129* 126* 127*  --  128*   SODIUM, ARTERIAL  --   --   --   --   --   --  128.7*   POTASSIUM 3.8  --  4.0 3.3* 3.4*  --  3.7   CHLORIDE 94*  --  95* 92* 95*  --  94*   CO2 22.9  --  22.0 22.1 21.0*  --  23.5   BUN 5*  --  5* 7* 11  --  9   CREATININE 0.38*  --  0.39* 0.43* 0.40*  --  0.43*   GLUCOSE 91  --  90 85 77  --  91   GLUCOSE, ARTERIAL  --   --   --   --   --   --  85   CALCIUM 8.6  --  8.7 8.5* 8.7  --  8.1*   TOTAL PROTEIN  --   --   --   --  6.0 6.5 6.2   ALBUMIN  --   --   --   --  2.9*  --  3.1*   GLOBULIN  --   --   --   --  3.1  --  3.1     CT Chest With Contrast Diagnostic    Result Date: 2/12/2024  PROCEDURE: CT CHEST W CONTRAST DIAGNOSTIC  COMPARISON:  [facility], PET, NM PET/CT SKULL BASE TO MID THIGH, 1/26/2024, 14:44.  [facility], CT, CT CHEST W CONTRAST DIAGNOSTIC, 2/09/2024, 15:12. INDICATIONS: worsening hypoxia, mutlitple acute DVTs  TECHNIQUE: After obtaining the patient's consent, CT images were obtained with non-ionic intravenous contrast material.   PROTOCOL:   Pulmonary embolism imaging protocol performed    RADIATION:   DLP: 762mGy*cm   Automated exposure control was utilized to minimize radiation dose. CONTRAST: 100cc Isovue 370 I.V.  FINDINGS:  Soft tissues of the lower neck demonstrate prominent venous collaterals in right upper chest and right lower neck related to severe SVC compression seen on the prior study.  Redemonstration of a large right paratracheal and right hilar mediastinal mass consistent with known malignant adenopathy which encases the SVC with severe  SVC narrowing and near occlusion.  This abnormal soft tissue measures up to 10 x 6 cm similar to prior study.  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  Normal contrast opacification of the left carotid artery and left subclavian artery.  Heart size normal.  Coronary calcifications and coronary stents noted.  Negative for pulmonary embolus.  Large mass in the right upper lobe consistent with known malignancy again noted measuring up to the 10 x 9 cm.  Mass again extends to the right hilum.   Moderate to large right pleural effusion similar to the prior study.  New moderate left pleural effusion.  Severe emphysema.  No pneumothorax.  Airspace disease involving the right lower lobe and right middle lobe likely compressive atelectasis mildly worsened from the prior study.  Mild basilar dependent atelectasis at the left lower lobe.  Numerous osseous metastatic lesions better demonstrated on recent PET-CT.  Negative for acute fracture.  Upper abdomen demonstrates focal avid enhancement in segment 4 consistent with quadrate lobe sign secondary to SVC occlusion.  Hepatic metastasis better assessed on recent PET-CT.  Bulky upper abdominal portacaval adenopathy better assessed on the recent PET-CT.       Impression:   1. Negative for pulmonary embolus. 2. Redemonstration of large malignant right upper lobe mass with extensive right paratracheal and right hilar mediastinal adenopathy which causes near complete SVC occlusion similar to prior study. 3. Moderate to large right pleural effusion similar to prior study with increased compressive atelectasis at the right middle and right lower lobes. 4. New moderate left-sided pleural effusion. 5. Hepatic and osseous metastases better demonstrated on recent PET-CT. 6. Additional chronic findings above.     TWILA VELEZ MD       Electronically Signed and Approved By: TWILA VELEZ MD on 2/12/2024 at 14:24              CT Chest With Contrast Diagnostic    Result Date: 2/9/2024  PROCEDURE: CT CHEST W CONTRAST DIAGNOSTIC  COMPARISON:  [facility], PET, NM PET/CT SKULL BASE TO MID THIGH, 1/26/2024, 14:44.  [facility], CR, XR CHEST 1 VW, 1/30/2024, 13:14.  [facility], CR, XR CHEST 1 VW, 2/09/2024, 13:19.  [facility], CT, CT ANGIOGRAM ABDOMEN PELVIS, 1/10/2024, 12:57.  INDICATIONS: shortness of breath, worsening lung cancer  TECHNIQUE: CT images were obtained with non-ionic intravenous contrast material.   PROTOCOL:   Pulmonary embolism imaging protocol performed    RADIATION:   DLP: 409.6 mGy*cm   Automated exposure control was utilized to minimize radiation dose. CONTRAST: 100 cc Isovue 370 I.V. LABS:   eGFR: >60 ml/min/1.73m2  FINDINGS:  The pulmonary arteries are well opacified.  No filling defects are seen.  There are no findings of PE.  The main pulmonary artery is of similar diameter to the ascending aorta, consistent with pulmonary artery hypertension.  Mediastinal windows reveal 9 cm mass in the right upper lobe with bulky hilar and confluent mediastinal adenopathy consistent with bronchogenic malignancy or lung cancer.  This was hypermetabolic on PET scan.  Cervical, mediastinal, and upper abdominal adenopathy is again seen, which was also hypermetabolic on PET scan.  The superior vena cava is surrounded by bulky adenopathy, and is markedly narrowed.  Extensive coronary artery calcifications are evident.  Moderate right pleural effusion appears slightly larger.  Lung window images reveal marked emphysema.  No airspace disease or mass is seen on the left.  Heterogeneous hepatic density is consistent with hypermetabolic metastatic lesions seen on CT scan.  Numerous hypermetabolic ICS lesions were seen on PET scan, no destructive bone lesion is evident.  CONTINUED ON NEXT PAGE...        Impression:   CT scan of the chest with IV contrast demonstrating no findings of PE.   Findings consistent with advanced bronchogenic malignancy or lung cancer are again evident.  The superior vena cava is surrounded by bulky adenopathy, and is markedly narrowed.      AJ TRAN MD       Electronically Signed and Approved By: AJ TRAN MD on 2/09/2024 at 15:49                Assessment & Plan   Assessment / Plan     Active Hospital Problems:  Active Hospital Problems    Diagnosis     **Hypoxia     CAD S/P percutaneous coronary angioplasty      Impression:  Acute on chronic hypoxic respiratory failure, wears O2 as needed  Right-sided pleural effusion  9 cm right upper lobe mass with bulky hilar /confluent mediastinal adenopathy  Chronic obstructive pulmonary disease  Current tobacco abuse of cigarettes  Hyponatremia  CAD with stent stent placement 12/23    Plan:  -Currently on Airvo, continue to wean O2 to maintain SpO2 greater than 88%  -CT chest with contrast reviewed.  No pulmonary embolus noted.  Redevelopment of moderate to large right pleural effusion similar to prior study with increased compressive atelectasis, new moderate left-sided pleural effusion, large malignant right upper lobe mass with extensive right paratracheal and right hilar mediastinal adenopathy with near complete SVC occlusion.  -Continue Zosyn for pneumonia and sepsis, may de-escalate pending cultures  -Continue Brovana, Pulmicort and DuoNebs  -Continue bronchopulmonary hygiene.  Encourage use of I-S and flutter valve.  -Continue aggressive pulmonary hygiene.  Chest percussor to right chest.  -Continue to monitor renal function and electrolytes.  Replace electrolytes as needed.  -Cardiology consulted. Appreciate assistance.  -Brilinta placed on hold per cardiology for possible bronchoscopy.  Continue IV cangrelor at this time.    -Continue aspirin  -Check D-dimer --> 1.7  -Acute bilateral upper extremity DVTs  -Continue heparin drip per protocol  -PT/OT on board.  Appreciate assistance.  -Palliative care on board.   Appreciate assistance.  Per palliative care, patient has intention to proceed with comfort measures after he is able to complete some legal documents and say goodbye to family and friends.  Patient is aware unless his O2 demand decreases significantly he will be required to remain in the hospital for transition to comfort measures.    Extensive discussion had with the patient today made it very clear to him that I suspect that he is dying from his lung cancer which appears to be progressive and aggressive  Recommend palliative care and potential comfort measures if the patient wishes to go this route this would not be unreasonable    DVT prophylaxis:  Medical and mechanical DVT prophylaxis orders are present.        CODE STATUS:   Code Status (Patient has no pulse and is not breathing): No CPR (Do Not Attempt to Resuscitate)  Medical Interventions (Patient has pulse or is breathing): Comfort Measures      Electronically signed by DINA Jorge, 02/13/24, 3:27 PM EST.    This visit was performed by BOTH a physician and an APC. I personally evaluated and examined the patient.  And spent more than 51% of time caring for this patient I performed all aspects of MDM as documented. , I have reviewed and confirmed the accuracy of the patient's history as documented in this note. and I have reexamined the patient and the results are consistent with the previously documented exam. I have updated the documentation as necessary    Electronically signed by Morteza Davila DO, 02/13/24, 5:10 PM EST.

## 2024-02-13 NOTE — PLAN OF CARE
Goal Outcome Evaluation:              Problem: Adult Inpatient Plan of Care  Goal: Plan of Care Review  Outcome: Ongoing, Progressing             Pt transitioning to comfort care

## 2024-02-13 NOTE — PLAN OF CARE
Goal Outcome Evaluation:      Patient on airvo this shift, episodes of confusion, ABG ordered, becomes very anxious at times taking off airvo. Heparin drip on hold at this time, cangrelor currently infusing. No other significant changes at this time.

## 2024-02-13 NOTE — SIGNIFICANT NOTE
02/13/24 1030   Coping/Psychosocial   Observed Emotional State anxious;frustrated   Verbalized Emotional State anxiety   Trust Relationship/Rapport empathic listening provided   Family/Support Persons spouse   Involvement in Care interacting with patient   Additional Documentation Spiritual Care (Group)   Spiritual Care   Use of Spiritual Resources prayer   Spiritual Care Source  initiative   Spiritual Care Follow-Up follow-up, none required as presently assessed   Response to Spiritual Care receptive of support   Spiritual Care Interventions supportive conversation provided;prayer support provided;other (see comments)  (The Pt has long cancer.)   Spiritual Care Visit Type initial   Receptivity to Spiritual Care visit welcomed

## 2024-02-13 NOTE — NURSING NOTE
Pt requesting to stop all treatment and come off HFNC; wife just left bedside and is aware of patient wishes; Dr. Serrano notified; see orders

## 2024-02-14 NOTE — CONSULTS
02/14/24 1005   Spiritual Care   Spiritual Care Source  initiative   Spiritual Care Follow-Up will follow closely   Spiritual Care Interventions end-of-life care assistance provided   Spiritual Care Visit Type other (see comments)  (Pt is Comfort Measures Only)   Spiritual Care Request family support;end-of-life issue(s) support  (family not at bedside at time of visit)   Receptivity to Spiritual Care other (see comments)  (pt resting at time of visit, but appeared restless)     Pt was visited yesterday by Chaplain Torres on 4MTU for prayer.  will continue to support pt and family as needed.

## 2024-02-14 NOTE — NURSING NOTE
The patient is on 3NT on comfort measures. Oxygen has been weaned to 1 liter, payton to Bedside drain, the patient is on his right side and restless. Staff having trouble getting a BP and temp. RR are 18, patients breathing is labored and patient had some moaning when spoken to. The patient was medicated with morphine at 0904 and was asked to provide ativan. Currently no family/friends at the bedside.    -Malka CHOU, RN, [facility]   Palliative Care    2/14/2024 09:42 EST

## 2024-02-14 NOTE — PLAN OF CARE
Problem: Adult Inpatient Plan of Care  Goal: Plan of Care Review  Outcome: Adequate for Care Transition  Goal: Patient-Specific Goal (Individualized)  Outcome: Adequate for Care Transition  Goal: Absence of Hospital-Acquired Illness or Injury  Outcome: Adequate for Care Transition  Intervention: Identify and Manage Fall Risk  Recent Flowsheet Documentation  Taken 2/14/2024 0904 by Rosalie Ramos RN  Safety Promotion/Fall Prevention: safety round/check completed  Taken 2/14/2024 0720 by Rosalie Ramos RN  Safety Promotion/Fall Prevention: safety round/check completed  Intervention: Prevent and Manage VTE (Venous Thromboembolism) Risk  Recent Flowsheet Documentation  Taken 2/14/2024 0904 by Rosalie Ramos RN  Activity Management: bedrest  Range of Motion: active ROM (range of motion) encouraged  Goal: Optimal Comfort and Wellbeing  Outcome: Adequate for Care Transition  Intervention: Monitor Pain and Promote Comfort  Recent Flowsheet Documentation  Taken 2/14/2024 0904 by Rosalie Ramos RN  Pain Management Interventions: see MAR  Intervention: Provide Person-Centered Care  Recent Flowsheet Documentation  Taken 2/14/2024 0904 by Rosalie Ramos RN  Trust Relationship/Rapport:   care explained   choices provided   emotional support provided   empathic listening provided   questions answered   questions encouraged   reassurance provided   thoughts/feelings acknowledged  Goal: Readiness for Transition of Care  Outcome: Adequate for Care Transition     Problem: Fall Injury Risk  Goal: Absence of Fall and Fall-Related Injury  Outcome: Adequate for Care Transition  Intervention: Identify and Manage Contributors  Recent Flowsheet Documentation  Taken 2/14/2024 0904 by Rosalie Ramos RN  Medication Review/Management: medications reviewed  Intervention: Promote Injury-Free Environment  Recent Flowsheet Documentation  Taken 2/14/2024 0904 by Rosalie Ramos RN  Safety Promotion/Fall Prevention: safety round/check  completed  Taken 2/14/2024 0720 by Rosalie Ramos RN  Safety Promotion/Fall Prevention: safety round/check completed     Problem: Skin Injury Risk Increased  Goal: Skin Health and Integrity  Outcome: Adequate for Care Transition     Problem: Hypertension Comorbidity  Goal: Blood Pressure in Desired Range  Outcome: Adequate for Care Transition  Intervention: Maintain Blood Pressure Management  Recent Flowsheet Documentation  Taken 2/14/2024 0904 by Rosalie Ramos RN  Medication Review/Management: medications reviewed     Problem: Palliative Care  Goal: Enhanced Quality of Life  Outcome: Adequate for Care Transition  Intervention: Maximize Comfort  Recent Flowsheet Documentation  Taken 2/14/2024 0904 by Rosalie Ramos RN  Pain Management Interventions: see MAR  Intervention: Optimize Psychosocial Wellbeing  Recent Flowsheet Documentation  Taken 2/14/2024 0904 by Rosalie Ramos RN  Family/Support System Care: self-care encouraged     Problem: End-of-Life Care  Goal: Comfort, Peace and Preserved Dignity  Outcome: Adequate for Care Transition  Intervention: Support the Grieving Process  Recent Flowsheet Documentation  Taken 2/14/2024 0904 by Rosalie Ramos RN  Family/Support System Care: self-care encouraged   Goal Outcome Evaluation:

## 2024-02-14 NOTE — PROGRESS NOTES
[facility]   Hospitalist Progress Note  Date: 2024  Patient Name: Hermelindo Zuñiga  : 1955  MRN: [id number]  Date of admission: 2024      Subjective   Subjective     Chief Complaint: Follow-up shortness of breath    Summary:Hermelindo Zuñiga is a 68 y.o. male with a past medical history of coronary artery disease with stents placed in 2023, lung mass that has not been biopsied at this point, diastolic heart failure, and hypertension who presents to the emergency department shortness of breath. Patient was seen on  with an ultrasound-guided right thoracentesis for pleural effusion.  700 cc were removed moved.  Patient initially felt very well but has started feeling short of breath since that time.  Of note he had a stent placed in 2023 so is on dual antiplatelet therapy.  He also has a mass in his lung that has not been biopsied and has not started treated on yet.  As result of all the symptoms he came the ER for further evaluation. In the emergency department patient's blood pressure was 81/67 on his right arm, satting 87% on 9 L of high flow.  Blood pressures taken on his left arm and this was normal.  This is showing that the lymphadenopathy is showing some hemodynamic issues with blood pressure on the right arm.    CT scan shows no PE.  Findings consistent with advanced bronchogenic malignancy or lung cancer evident.  There is concern for possible SV syndrome due to expansion of tumor and adenopathy.  Also concern for postobstructive pneumonia.  Pulmonology was consulted and they performed bedside thoracentesis.  Patient be admitted for acute hypoxic respiratory failure due to obstructive pneumonia and worsening malignancy. Patient continued to decline. Cardiology was consulted for opinion on holding Brilinta for possible biopsy and patient was transitioned to cangrelor. Patient respiratory status worsened and required airvo. Patient also had doppler which  showed multiple DVTs.  Started on heparin drip.  Patient discussed prognosis and goals of care extensively with pulmonology and palliative care.  Following these conversations patient has decided to transition to comfort measures only.    Interval Followup: Patient lying in bed appears to be somewhat anxious.  Patient states that he feels ready to transition to comfort measures only at this time.  Significant other at the bedside.  Patient afebrile overnight.  Heart rate within normal limits.  Blood pressure low this morning improving throughout the day.  Still requiring Airvo 55 L 90% FiO2 keep sats in the 90s.    Review of Systems  Constitutional: Negative for fatigue and fever.   HENT: Negative for sore throat and trouble swallowing.    Eyes: Negative for pain and discharge.   Respiratory: Positive for cough and shortness of breath.    Cardiovascular: Negative for chest pain and palpitations.   Gastrointestinal: Negative for abdominal pain, nausea and vomiting.   Endocrine: Negative for cold intolerance and heat intolerance.   Genitourinary: Negative for difficulty urinating and dysuria.   Musculoskeletal: Negative for back pain and neck stiffness.   Skin: Negative for color change and rash.   Neurological: Negative for syncope and headaches.   Hematological: Negative for adenopathy.   Psychiatric/Behavioral: Negative for confusion and hallucinations.    Objective   Objective     Vitals:   Temp:  [97 °F (36.1 °C)-98 °F (36.7 °C)] 97.3 °F (36.3 °C)  Heart Rate:  [] 86  Resp:  [18-22] 18  BP: ()/(60-85) 117/85  Flow (L/min):  [50-55] 50  Physical Exam   General: well-developed appearing stated age thin frail muscle wasting bilateral upper and lower extremities in no acute distress  HEENT: Normocephalic atraumatic moist membranes pupils equal round reactive light, no scleral icterus no conjunctival injection  Cardiovascular: regular rate and rhythm no murmurs rubs or gallops S1-S2, no lower extremity  edema appreciated  Pulmonary: Diminished to auscultation bilaterally scant wheezes no rales or rhonchi symmetric chest expansion, unlabored, no conversational dyspnea appreciated  Gastrointestinal: Soft nontender nondistended positive bowel sounds all 4 quadrants no rebound or guarding  Musculoskeletal: No clubbing cyanosis, warm and well-perfused, calves soft symmetric nontender bilaterally  Skin: Clean dry without rashes  Neuro: Cranial nerves II through XII intact grossly no sensorimotor deficits appreciated bilateral upper and lower extremities  Psych: Patient is calm cooperative and appropriate with exam not responding to internal stimuli  : No Nye catheter no bladder distention no suprapubic tenderness    Result Review    Result Review:  I have personally reviewed these results and agree with these findings:  [x]  Laboratory  LAB RESULTS:      Lab 02/13/24  1130 02/13/24  0523 02/13/24  0205 02/12/24  1941 02/12/24  1239 02/10/24  0532 02/09/24  2034 02/09/24  1733 02/09/24  1254 02/09/24  1251   WBC  --   --  4.35  --  4.68 5.57  --   --   --  7.03   HEMOGLOBIN  --   --  9.7*  --  10.5* 10.5*  --   --   --  10.5*   HEMATOCRIT  --   --  28.8*  --  31.1* 31.9*  --   --   --  31.1*   PLATELETS  --   --  366  --  375 349  --   --   --  353   NEUTROS ABS  --   --  3.18  --  3.80 4.47  --   --   --  6.00   IMMATURE GRANS (ABS)  --   --  0.03  --  0.03 0.03  --   --   --  0.03   LYMPHS ABS  --   --  0.53*  --  0.29* 0.45*  --   --   --  0.32*   MONOS ABS  --   --  0.55  --  0.51 0.52  --   --   --  0.64   EOS ABS  --   --  0.05  --  0.03 0.08  --   --   --  0.02   MCV  --   --  93.2  --  93.1 94.7  --   --   --  94.0   PROCALCITONIN  --   --   --   --   --  13.23*  --   --   --  12.17*   LACTATE  --   --  1.9  --   --   --  1.9 2.7* 2.5*  --    LACTATE, ARTERIAL  --   --   --   --   --   --   --   --   --  1.86   LDH  --   --   --   --   --   --  853*  --   --   --    PROTIME  --   --   --   --  14.3  --   --    --   --   --    APTT 77.8* 56.3* 140.9* 55.0* 29.3*  --   --   --   --   --    D DIMER QUANT  --   --   --   --  1.71*  --   --   --   --   --          Lab 02/13/24  0205 02/12/24  0446 02/11/24  0508 02/10/24  0532 02/09/24  1251   SODIUM 128* 129* 126* 127* 128*   SODIUM, ARTERIAL  --   --   --   --  128.7*   POTASSIUM 3.8 4.0 3.3* 3.4* 3.7   CHLORIDE 94* 95* 92* 95* 94*   CO2 22.9 22.0 22.1 21.0* 23.5   ANION GAP 11.1 12.0 11.9 11.0 10.5   BUN 5* 5* 7* 11 9   CREATININE 0.38* 0.39* 0.43* 0.40* 0.43*   EGFR 122.2 121.3 117.7 120.3 117.7   GLUCOSE 91 90 85 77 91   GLUCOSE, ARTERIAL  --   --   --   --  85   CALCIUM 8.6 8.7 8.5* 8.7 8.1*   IONIZED CALCIUM  --   --   --   --  1.03*   MAGNESIUM 1.7  --  1.6 1.8  --          Lab 02/10/24  0532 02/09/24  1733 02/09/24  1251   TOTAL PROTEIN 6.0 6.5 6.2   ALBUMIN 2.9*  --  3.1*   GLOBULIN 3.1  --  3.1   ALT (SGPT) 9  --  10   AST (SGOT) 27  --  26   BILIRUBIN 0.5  --  0.6   ALK PHOS 108  --  114         Lab 02/12/24  1239 02/09/24  1251   PROBNP  --  446.2   HSTROP T  --  21   PROTIME 14.3  --    INR 1.09  --                  Lab 02/12/24  2339 02/09/24  1251   PH, ARTERIAL 7.453* 7.458*   PCO2, ARTERIAL 33.9* 31.4*   PO2 ART 71.1* 45.7*   O2 SATURATION ART 93.3* 80.6*   FIO2 90 40   HCO3 ART 23.2 21.7*   BASE EXCESS ART -0.3 -1.4   CARBOXYHEMOGLOBIN 0.3 1.0     Brief Urine Lab Results  (Last result in the past 365 days)        Color   Clarity   Blood   Leuk Est   Nitrite   Protein   CREAT   Urine HCG        01/10/24 1534 Yellow   Clear   Negative   Negative   Negative   Negative                 Microbiology Results (last 10 days)       Procedure Component Value - Date/Time    MRSA Screen, PCR (Inpatient) - Swab, Nares [[id number]]  (Normal) Collected: 02/10/24 1720    Lab Status: Final result Specimen: Swab from Nares Updated: 02/10/24 1853     MRSA PCR No MRSA Detected    Narrative:      The negative predictive value of this diagnostic test is high and should only be used  to consider de-escalating anti-MRSA therapy. A positive result may indicate colonization with MRSA and must be correlated clinically.    S. Pneumo Ag Urine or CSF - Urine, Urine, Clean Catch [[id number]]  (Normal) Collected: 02/10/24 1701    Lab Status: Final result Specimen: Urine, Clean Catch Updated: 02/10/24 1729     Strep Pneumo Ag Negative    Legionella Antigen, Urine - Urine, Urine, Clean Catch [[id number]]  (Normal) Collected: 02/10/24 1701    Lab Status: Final result Specimen: Urine, Clean Catch Updated: 02/10/24 1729     LEGIONELLA ANTIGEN, URINE Negative    Body Fluid Culture - Body Fluid, Pleural Cavity [[id number]] Collected: 02/09/24 1734    Lab Status: Final result Specimen: Body Fluid from Pleural Cavity Updated: 02/12/24 0801     Body Fluid Culture No growth at 3 days     Gram Stain No organisms seen    Anaerobic Culture - Pleural Fluid, Pleural Cavity [[id number]]  (Normal) Collected: 02/09/24 1734    Lab Status: Preliminary result Specimen: Pleural Fluid from Pleural Cavity Updated: 02/12/24 0818     Anaerobic Culture No anaerobes isolated at 3 days    COVID-19, FLU A/B, RSV PCR 1 HR TAT - Swab, Nasopharynx [[id number]]  (Normal) Collected: 02/09/24 1334    Lab Status: Final result Specimen: Swab from Nasopharynx Updated: 02/09/24 1424     COVID19 Not Detected     Influenza A PCR Not Detected     Influenza B PCR Not Detected     RSV, PCR Not Detected    Narrative:      Fact sheet for providers: https://www.fda.gov/media/727752/download    Fact sheet for patients: https://www.fda.gov/media/776338/download    Test performed by PCR.    Blood Culture - Blood, Arm, Left [[id number]]  (Normal) Collected: 02/09/24 1254    Lab Status: Preliminary result Specimen: Blood from Arm, Left Updated: 02/13/24 1301     Blood Culture No growth at 4 days    Mycoplasma Pneumoniae Antibody, IgM - Blood, [[id number]]  (Normal) Collected: 02/09/24 1251    Lab Status: Final result Specimen: Blood Updated: 02/09/24 1939      Mycoplasma pneumo IgM Negative            [x]  Microbiology  [x]  Radiology  CT Chest With Contrast Diagnostic    Result Date: 2/12/2024    1. Negative for pulmonary embolus. 2. Redemonstration of large malignant right upper lobe mass with extensive right paratracheal and right hilar mediastinal adenopathy which causes near complete SVC occlusion similar to prior study. 3. Moderate to large right pleural effusion similar to prior study with increased compressive atelectasis at the right middle and right lower lobes. 4. New moderate left-sided pleural effusion. 5. Hepatic and osseous metastases better demonstrated on recent PET-CT. 6. Additional chronic findings above.     TWILA VELEZ MD       Electronically Signed and Approved By: TWILA VELEZ MD on 2/12/2024 at 14:24             XR Chest 1 View    Result Date: 2/11/2024    Emphysema.  Right upper lobe mass and mediastinal adenopathy is unchanged.  Small bilateral pleural effusions.       AJ TRAN MD       Electronically Signed and Approved By: AJ TRAN MD on 2/11/2024 at 17:07             XR Chest 1 View    Result Date: 2/9/2024    No pneumothorax status post thoracentesis.  Trace residual right effusion.       JENNIFER BLOUNT MD       Electronically Signed and Approved By: JENNIFER BLOUNT MD on 2/09/2024 at 17:54             CT Chest With Contrast Diagnostic    Result Date: 2/9/2024    CT scan of the chest with IV contrast demonstrating no findings of PE.  Findings consistent with advanced bronchogenic malignancy or lung cancer are again evident.  The superior vena cava is surrounded by bulky adenopathy, and is markedly narrowed.      AJ TRAN MD       Electronically Signed and Approved By: AJ TRAN MD on 2/09/2024 at 15:49             XR Chest 1 View    Result Date: 2/9/2024    1. Increasing small right-sided pleural effusion and worsening right basilar airspace disease compared to the prior exam. 2. Unchanged right upper lobe  consolidation and likely right suprahilar adenopathy. 3. Suspected findings of COPD/emphysema.        RODRIGUEZ MELENDEZ MD       Electronically Signed and Approved By: RODRIGUEZ MELENDEZ MD on 2/09/2024 at 13:32              []  EKG/Telemetry   []  Cardiology/Vascular   []  Pathology  []  Old records  [x]  Other:  Scheduled Meds:arformoterol, 15 mcg, Nebulization, BID - RT  budesonide, 0.5 mg, Nebulization, BID - RT  gabapentin, 600 mg, Oral, Q8H  ipratropium-albuterol, 3 mL, Nebulization, Q4H - RT  sodium chloride, 10 mL, Intravenous, Q12H      Continuous Infusions:   PRN Meds:.  acetaminophen    atropine    diazePAM **OR** LORazepam    diazePAM **OR** LORazepam    diazePAM **OR** LORazepam    haloperidol **OR** haloperidol lactate    HYDROcodone-acetaminophen    Morphine **OR** morphine    Morphine **AND** naloxone    ondansetron    sodium chloride    sodium chloride    sodium chloride      Assessment & Plan   Assessment / Plan     Assessment/Plan:  Acute hypoxic respiratory failure  Postobstructive pneumonia with risk for Pseudomonas and MRSA  9 cm right upper lobe mass with bulky hilar mediastinal adenopathy  Right sided pleural effusion s/p thoracentesis 2/9/24  Coronary artery disease with stent placed in December 2023 on dual antiplatelet therapy  Severe protein calorie malnutrition  Hyponatremia due to SIADH and lung cancer  Hypokalemia   COPD  Tobacco abuse with cigarettes  Acute DVTs of the right internal jugular, left internal jugular, and left subclavian  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  End-of-life care          Patient admitted for further evaluation and treatment  Pulmonology and cardiology consulted thank you for your assistance  Patient wanting to transition to comfort measures only at this time  Continue supplemental oxygen and titrate as needed for patient comfort  Start morphine as needed for pain and air hunger  Start Ativan and Valium as needed for anxiety  Start Haldol as needed for  agitation nausea and vomiting  Continue DuoNebs as needed for shortness of breath and wheezing  Continue gabapentin 3 times daily  Start as needed nicotine patch for withdrawal symptoms  Palliative care consulted thank you for your assistance  Further inpatient orders recommendations pending clinical course       Discussed plan with bedside RN as well as Dr. Davila pulmonology critical care attending.    Disposition: Pending patient's progression in the next 24 hours suspect patient will likely pass while in hospital if not can pursue returning home with hospice depending on level of need and symptom control.    DVT prophylaxis:  Mechanical DVT prophylaxis orders are present.        CODE STATUS:   Code Status (Patient has no pulse and is not breathing): No CPR (Do Not Attempt to Resuscitate)  Medical Interventions (Patient has pulse or is breathing): Comfort Measures

## 2024-02-14 NOTE — CONSULTS
Patient has transitioned to comfort measures only. RD will sign off at this time. If further clinical nutrition services are desired, please re-consult.

## 2024-02-15 NOTE — DISCHARGE SUMMARY
[facility]         HOSPITALIST  DISCHARGE/DEATH SUMMARY    Patient Name: Hermelindo Zuñiga  : 1955  MRN: [id number]    Date of Admission: 2024  Date of Discharge/death: 2024  Primary Care Physician: Antony Florence MD    Consults       Date and Time Order Name Status Description    2/10/2024 11:41 AM Inpatient Cardiology Consult      2024  4:52 PM IP General Consult (Use specialty-specific consult if known)      2024  4:31 PM Hospitalist (on-call MD unless specified)              Active and Resolved Hospital Problems:  Acute hypoxic respiratory failure  9 cm right upper lobe mass with bulky hilar mediastinal adenopathy affected primary lung malignancy  Postobstructive pneumonia with risk for Pseudomonas and MRSA  Right sided pleural effusion s/p thoracentesis 24  Coronary artery disease with stent placed in 2023 on dual antiplatelet therapy  Severe protein calorie malnutrition  Hyponatremia due to SIADH and lung cancer  Hypokalemia   COPD  Tobacco abuse with cigarettes  Acute DVTs of the right internal jugular, left internal jugular, and left subclavian  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       Hospital Course     Hospital Course:  Hermelindo Zuñiga is a 68 y.o. male  with a past medical history of coronary artery disease with stents placed in 2023, lung mass that has not been biopsied at this point, diastolic heart failure, and hypertension who presents to the emergency department shortness of breath. Patient was seen on  with an ultrasound-guided right thoracentesis for pleural effusion.  700 cc were removed moved.  Patient initially felt very well but has started feeling short of breath since that time.   Of note he had a stent placed in 2023 so is on dual antiplatelet therapy.  He also has a mass in his lung that has not been biopsied and has not started treated on yet.  As result of all the symptoms he came the ER for further evaluation. In the emergency department patient's blood pressure was 81/67 on his right arm, satting 87% on 9 L of high flow.  Blood pressures taken on his left arm and this was normal.  This is showing that the lymphadenopathy is showing some hemodynamic issues with blood pressure on the right arm.    CT scan shows no PE.  Findings consistent with advanced bronchogenic malignancy or lung cancer evident.  There is concern for possible SV syndrome due to expansion of tumor and adenopathy.  Also concern for postobstructive pneumonia.  Pulmonology was consulted and they performed bedside thoracentesis.  Patient be admitted for acute hypoxic respiratory failure due to obstructive pneumonia and worsening malignancy. Patient continued to decline. Cardiology was consulted for opinion on holding Brilinta for possible biopsy and patient was transitioned to cangrelor. Patient respiratory status worsened and required airvo. Patient also had doppler which showed multiple DVTs.  Started on heparin drip.  Patient discussed prognosis and goals of care extensively with pulmonology and palliative care.  Following these conversations patient has decided to transition to comfort measures only.  Aggressive measures stopped.  Patient started on comfort medications.  Patient declined rapidly and  on the afternoon of 2024 due to acute hypoxic respiratory failure due to right upper lobe mass with bulky hilar and mediastinal adenopathy suspected to represent primary lung malignancy complicated by postobstructive pneumonia.  Personally performed the exam confirming patient was apneic and pulseless and declared time of death with nurse at the bedside.          Day of Discharge     Vital Signs:  Temp:   [98 °F (36.7 °C)] 98 °F (36.7 °C)  Heart Rate:  [90-99] 99  Resp:  [20] 20  BP: (120)/(82) 120/82  Physical Exam:   General: well-developed appearing stated age thin frail muscle wasting bilateral upper and lower extremities   HEENT: Normocephalic atraumatic pupils nonreactive  Cardiovascular: no heart tones appreciated on auscultation no radial or jugular pulses appreciated bilaterally  Pulmonary: Apneic  Gastrointestinal: Soft nondistended  Skin: Clean dry  : Nye catheter draining clear yellow urine      Discharge Details        Discharge Medications        ASK your doctor about these medications        Instructions Start Date   albuterol sulfate  (90 Base) MCG/ACT inhaler  Commonly known as: PROVENTIL HFA;VENTOLIN HFA;PROAIR HFA   2 puffs, Inhalation, Every 4 Hours PRN      aspirin 81 MG EC tablet   81 mg, Oral, Daily      atorvastatin 40 MG tablet  Commonly known as: LIPITOR   40 mg, Oral, Nightly      cholecalciferol 25 MCG (1000 UT) tablet  Commonly known as: VITAMIN D3   1,000 Units, Oral, Daily      gabapentin 600 MG tablet  Commonly known as: NEURONTIN   1.5 tablets, Oral, 3 Times Daily      HYDROcodone-acetaminophen  MG per tablet  Commonly known as: NORCO   1 tablet, Oral, Every 8 Hours PRN      hydrOXYzine 25 MG tablet  Commonly known as: ATARAX   25 mg, Oral, 3 Times Daily PRN      metoprolol succinate XL 25 MG 24 hr tablet  Commonly known as: TOPROL-XL   25 mg, Oral, Every 24 Hours Scheduled      nitroglycerin 0.4 MG SL tablet  Commonly known as: NITROSTAT   0.4 mg, Sublingual, Every 5 Minutes PRN, Take no more than 3 doses in 15 minutes.      Stiolto Respimat 2.5-2.5 MCG/ACT aerosol solution inhaler  Generic drug: tiotropium bromide-olodaterol   2 puffs, Inhalation, Daily - RT      ticagrelor 90 MG tablet tablet  Commonly known as: BRILINTA   90 mg, Oral, 2 Times Daily               No Known Allergies    Discharge Disposition:      Diet:  Hospital:  Diet Order   Procedures     Diet: Regular/House Diet; Texture: Regular Texture (IDDSI 7); Fluid Consistency: Thin (IDDSI 0)       Discharge Activity:       CODE STATUS:  Code Status and Medical Interventions:   Ordered at: 02/13/24 1450     Code Status (Patient has no pulse and is not breathing):    No CPR (Do Not Attempt to Resuscitate)     Medical Interventions (Patient has pulse or is breathing):    Comfort Measures         No future appointments.        Pertinent  and/or Most Recent Results     PROCEDURES:         Chaitanya Dsouza MD   Physician  Pulmonology     Procedures      Signed     Date of Service: 02/09/24 1722  Creation Time: 02/09/24 1722     Signed         Procedure: Bedside thoracic ultrasound:     Left showed B lines, curtain sign seen.     Right side showed moderate to large effusion with anechoic space between lung and diaphragm.      Site marked for thoracentesis.     Images stored online.      Electronically signed by Chaitanya Dsouza MD, 2/9/2024, 17:23 EST.                     Chaitanya Dsouza MD   Physician  Pulmonology     Procedures      Signed     Date of Service: 02/09/24 1723  Creation Time: 02/09/24 1723     Signed         Procedure:Thoracentesis      Indication: Large sided pleural effusion     Consent: Yes, placed in chart.     Procedure: The patient was placed in the sitting position and the appropriate landmarks were identified. The skin over the puncture site in the right posterior chest wall was prepped with ChloraPrep and draped in sterile fashion. Local anesthesia was applied with 1% lidocaine. Thoracentesis catheter was inserted with needle guidance. Pleural fluid was serosanguineous, drained 1300 mL fluid. The catheter was then withdrawn and a sterile dressing was placed over the site. A post-procedure film is pending at this time.     The patient tolerated the procedure well.     Complications: None     Electronically signed by Chaitanya Dsouza MD, 2/9/2024, 17:23 EST.                      LAB RESULTS:      Lab  02/13/24  1130 02/13/24  0523 02/13/24  0205 02/12/24  1941 02/12/24  1239 02/10/24  0532 02/09/24  2034 02/09/24  1733 02/09/24  1254 02/09/24  1251   WBC  --   --  4.35  --  4.68 5.57  --   --   --  7.03   HEMOGLOBIN  --   --  9.7*  --  10.5* 10.5*  --   --   --  10.5*   HEMATOCRIT  --   --  28.8*  --  31.1* 31.9*  --   --   --  31.1*   PLATELETS  --   --  366  --  375 349  --   --   --  353   NEUTROS ABS  --   --  3.18  --  3.80 4.47  --   --   --  6.00   IMMATURE GRANS (ABS)  --   --  0.03  --  0.03 0.03  --   --   --  0.03   LYMPHS ABS  --   --  0.53*  --  0.29* 0.45*  --   --   --  0.32*   MONOS ABS  --   --  0.55  --  0.51 0.52  --   --   --  0.64   EOS ABS  --   --  0.05  --  0.03 0.08  --   --   --  0.02   MCV  --   --  93.2  --  93.1 94.7  --   --   --  94.0   PROCALCITONIN  --   --   --   --   --  13.23*  --   --   --  12.17*   LACTATE  --   --  1.9  --   --   --  1.9 2.7* 2.5*  --    LACTATE, ARTERIAL  --   --   --   --   --   --   --   --   --  1.86   LDH  --   --   --   --   --   --  853*  --   --   --    PROTIME  --   --   --   --  14.3  --   --   --   --   --    APTT 77.8* 56.3* 140.9* 55.0* 29.3*  --   --   --   --   --    D DIMER QUANT  --   --   --   --  1.71*  --   --   --   --   --          Lab 02/13/24  0205 02/12/24  0446 02/11/24  0508 02/10/24  0532 02/09/24  1251   SODIUM 128* 129* 126* 127* 128*   SODIUM, ARTERIAL  --   --   --   --  128.7*   POTASSIUM 3.8 4.0 3.3* 3.4* 3.7   CHLORIDE 94* 95* 92* 95* 94*   CO2 22.9 22.0 22.1 21.0* 23.5   ANION GAP 11.1 12.0 11.9 11.0 10.5   BUN 5* 5* 7* 11 9   CREATININE 0.38* 0.39* 0.43* 0.40* 0.43*   EGFR 122.2 121.3 117.7 120.3 117.7   GLUCOSE 91 90 85 77 91   GLUCOSE, ARTERIAL  --   --   --   --  85   CALCIUM 8.6 8.7 8.5* 8.7 8.1*   IONIZED CALCIUM  --   --   --   --  1.03*   MAGNESIUM 1.7  --  1.6 1.8  --          Lab 02/10/24  0532 02/09/24  1733 02/09/24  1251   TOTAL PROTEIN 6.0 6.5 6.2   ALBUMIN 2.9*  --  3.1*   GLOBULIN 3.1  --  3.1   ALT (SGPT)  9  --  10   AST (SGOT) 27  --  26   BILIRUBIN 0.5  --  0.6   ALK PHOS 108  --  114         Lab 02/12/24  1239 02/09/24  1251   PROBNP  --  446.2   HSTROP T  --  21   PROTIME 14.3  --    INR 1.09  --                  Lab 02/12/24  2339 02/09/24  1251   PH, ARTERIAL 7.453* 7.458*   PCO2, ARTERIAL 33.9* 31.4*   PO2 ART 71.1* 45.7*   O2 SATURATION ART 93.3* 80.6*   FIO2 90 40   HCO3 ART 23.2 21.7*   BASE EXCESS ART -0.3 -1.4   CARBOXYHEMOGLOBIN 0.3 1.0     Brief Urine Lab Results  (Last result in the past 365 days)        Color   Clarity   Blood   Leuk Est   Nitrite   Protein   CREAT   Urine HCG        01/10/24 1534 Yellow   Clear   Negative   Negative   Negative   Negative                 Microbiology Results (last 10 days)       Procedure Component Value - Date/Time    MRSA Screen, PCR (Inpatient) - Swab, Nares [[id number]]  (Normal) Collected: 02/10/24 1720    Lab Status: Final result Specimen: Swab from Nares Updated: 02/10/24 1853     MRSA PCR No MRSA Detected    Narrative:      The negative predictive value of this diagnostic test is high and should only be used to consider de-escalating anti-MRSA therapy. A positive result may indicate colonization with MRSA and must be correlated clinically.    S. Pneumo Ag Urine or CSF - Urine, Urine, Clean Catch [[id number]]  (Normal) Collected: 02/10/24 1701    Lab Status: Final result Specimen: Urine, Clean Catch Updated: 02/10/24 1729     Strep Pneumo Ag Negative    Legionella Antigen, Urine - Urine, Urine, Clean Catch [[id number]]  (Normal) Collected: 02/10/24 1701    Lab Status: Final result Specimen: Urine, Clean Catch Updated: 02/10/24 1729     LEGIONELLA ANTIGEN, URINE Negative    Body Fluid Culture - Body Fluid, Pleural Cavity [[id number]] Collected: 02/09/24 1734    Lab Status: Final result Specimen: Body Fluid from Pleural Cavity Updated: 02/12/24 0801     Body Fluid Culture No growth at 3 days     Gram Stain No organisms seen    Anaerobic Culture - Pleural Fluid,  Pleural Cavity [[id number]]  (Normal) Collected: 02/09/24 1734    Lab Status: Final result Specimen: Pleural Fluid from Pleural Cavity Updated: 02/14/24 1055     Anaerobic Culture No anaerobes isolated at 5 days    Fungus Culture - Body Fluid, Lung, R [[id number]] Collected: 02/09/24 1734    Lab Status: Preliminary result Specimen: Body Fluid from Lung, R Updated: 02/14/24 1745     Fungus Culture No fungus isolated at less than 1 week    COVID-19, FLU A/B, RSV PCR 1 HR TAT - Swab, Nasopharynx [[id number]]  (Normal) Collected: 02/09/24 1334    Lab Status: Final result Specimen: Swab from Nasopharynx Updated: 02/09/24 1424     COVID19 Not Detected     Influenza A PCR Not Detected     Influenza B PCR Not Detected     RSV, PCR Not Detected    Narrative:      Fact sheet for providers: https://www.fda.gov/media/811496/download    Fact sheet for patients: https://www.fda.gov/media/024729/download    Test performed by PCR.    Blood Culture - Blood, Arm, Left [[id number]]  (Normal) Collected: 02/09/24 1254    Lab Status: Final result Specimen: Blood from Arm, Left Updated: 02/14/24 1300     Blood Culture No growth at 5 days    Mycoplasma Pneumoniae Antibody, IgM - Blood, [[id number]]  (Normal) Collected: 02/09/24 1251    Lab Status: Final result Specimen: Blood Updated: 02/09/24 1939     Mycoplasma pneumo IgM Negative            CT Chest With Contrast Diagnostic    Result Date: 2/12/2024  Impression:   1. Negative for pulmonary embolus. 2. Redemonstration of large malignant right upper lobe mass with extensive right paratracheal and right hilar mediastinal adenopathy which causes near complete SVC occlusion similar to prior study. 3. Moderate to large right pleural effusion similar to prior study with increased compressive atelectasis at the right middle and right lower lobes. 4. New moderate left-sided pleural effusion. 5. Hepatic and osseous metastases better demonstrated on recent PET-CT. 6. Additional chronic findings above.      TWILA VELEZ MD       Electronically Signed and Approved By: TWILA VELEZ MD on 2/12/2024 at 14:24             XR Chest 1 View    Result Date: 2/11/2024  Impression:   Emphysema.  Right upper lobe mass and mediastinal adenopathy is unchanged.  Small bilateral pleural effusions.       AJ TRAN MD       Electronically Signed and Approved By: AJ TRAN MD on 2/11/2024 at 17:07             XR Chest 1 View    Result Date: 2/9/2024  Impression:   No pneumothorax status post thoracentesis.  Trace residual right effusion.       JENNIFER BLOUNT MD       Electronically Signed and Approved By: JENNIFER BLOUNT MD on 2/09/2024 at 17:54             CT Chest With Contrast Diagnostic    Result Date: 2/9/2024  Impression:   CT scan of the chest with IV contrast demonstrating no findings of PE.  Findings consistent with advanced bronchogenic malignancy or lung cancer are again evident.  The superior vena cava is surrounded by bulky adenopathy, and is markedly narrowed.      AJ TRAN MD       Electronically Signed and Approved By: AJ TRAN MD on 2/09/2024 at 15:49             XR Chest 1 View    Result Date: 2/9/2024  Impression:   1. Increasing small right-sided pleural effusion and worsening right basilar airspace disease compared to the prior exam. 2. Unchanged right upper lobe consolidation and likely right suprahilar adenopathy. 3. Suspected findings of COPD/emphysema.        RODRIGUEZ MELENDEZ MD       Electronically Signed and Approved By: RODRIGUEZ MELENDEZ MD on 2/09/2024 at 13:32             XR Chest 1 View    Result Date: 1/30/2024  Impression:   No pneumothorax status post thoracentesis.       JENNIFER BLOUNT MD       Electronically Signed and Approved By: JENNIFER BLOUNT MD on 1/30/2024 at 13:56             NM PET/CT Skull Base to Mid Thigh    Result Date: 1/26/2024  Impression:   1. Large FDG avid mass within right upper lobe, most suggestive of primary lung malignancy.  There are several smaller FDG  avid satellite nodules, likely malignant as well. 2. FDG avid cervical, mediastinal, hilar, and upper abdominal lymphadenopathy, likely metastatic. 3. FDG avid soft tissue nodules within subcutaneous tissues of pelvis, likely metastatic. 4. Numerous FDG avid hepatic and osseous lesions, likely metastatic.      BLAIR ROBERSON MD       Electronically Signed and Approved By: BLAIR ROBERSON MD on 1/26/2024 at 16:20              Results for orders placed during the hospital encounter of 02/09/24    Duplex Venous Upper Extremity - Left CV-READ    Interpretation Summary    Acute right upper extremity deep vein thrombosis noted in the internal jugular.    Acute left upper extremity deep vein thrombosis noted in the internal jugular and subclavian.    All other left sided vessels appear normal.    An incidental image of the right common carotid artery suggested occlusion.      Results for orders placed during the hospital encounter of 02/09/24    Duplex Venous Upper Extremity - Left CV-READ    Interpretation Summary    Acute right upper extremity deep vein thrombosis noted in the internal jugular.    Acute left upper extremity deep vein thrombosis noted in the internal jugular and subclavian.    All other left sided vessels appear normal.    An incidental image of the right common carotid artery suggested occlusion.      Results for orders placed during the hospital encounter of 12/25/23    Adult Transthoracic Echo Complete W/ Cont if Necessary Per Protocol    Interpretation Summary    Left ventricular ejection fraction appears to be 51 - 55%.    Left ventricular diastolic function is consistent with (grade I) impaired relaxation and age.    Estimated right ventricular systolic pressure from tricuspid regurgitation is moderately elevated (45-55 mmHg).    Mild to moderate tricuspid regurgitation.      Labs Pending at Discharge:  Pending Labs       Order Current Status    Non-gynecologic Cytology Collected (02/09/24 4270)     Fungus Culture - Body Fluid, Lung, R Preliminary result              Time spent on Discharge including face to face service: 25 minutes    Electronically signed by Steven Serrano MD, 02/14/24, 8:15 PM EST.

## 2024-02-16 LAB — FUNGUS WND CULT: NORMAL

## 2024-02-20 LAB
FUNGUS WND CULT: NORMAL
MYCOBACTERIUM SPEC CULT: NORMAL
NIGHT BLUE STAIN TISS: NORMAL

## 2024-02-23 LAB — FUNGUS WND CULT: NORMAL

## 2024-02-23 NOTE — PROGRESS NOTES
"Enter Query Response Below      Query Response: unable to determine             If applicable, please update the problem list.   Patient: Hermelindo Zuñiga        : 1955  Account: [id number]           Admit Date: 2024        How to Respond to this query:       a. Click New Note     b. Answer query within the yellow box.                c. Update the Problem List, if applicable.      If you have any questions about this query contact me at: [email or web address]     Dr. Dsouza,     68yr male noted with documentation of \"Postobstructive pneumonia with risk for Pseudomonas and MRSA\" in discharge summary. Patient started on IV Zosyn  thru 13, Vancomycin given  thru . Progress note on 2/10/24 states \"CT chest reviewed revealing large right upper lobe mass with hilar and mediastinal adenopathy concerning for malignancy, moderate right pleural effusion, concern for postobstructive pneumonia\". Patient noted with history of COPD, HTN and CHF. No MRSA PCR detected. Sputum culture on 24 rejected.     Please clarify the type of pneumonia the patient was treated/monitored for:    Gram negative pneumonia (excluding Haemophilus influenzae)  MRSA pneumonia  (Organism) pneumonia  Bacterial pneumonia unspecified  Aspiration pneumonia  Other- specify______  Unable to determine      By submitting this query, we are merely seeking further clarification of documentation to accurately reflect all conditions that you are monitoring, evaluating, treating or that extend the hospitalization or utilize additional resources of care. Please utilize your independent clinical judgment when addressing the question(s) above.     This query and your response, once completed, will be entered into the legal medical record.    Sincerely,  Nay MOODY Rn  Clinical Documentation Integrity Program   "

## 2024-02-27 LAB
FUNGUS WND CULT: NORMAL
MYCOBACTERIUM SPEC CULT: NORMAL
NIGHT BLUE STAIN TISS: NORMAL

## 2024-03-01 LAB — FUNGUS WND CULT: NORMAL

## 2024-03-03 NOTE — PROGRESS NOTES
"Enter Query Response Below      Query Response: Acute hypoxic respiratory failure due to sepsis ruled in              If applicable, please update the problem list.   Patient: Hermelindo Zuñiga        : 1955  Account: [id number]           Admit Date: 2024        How to Respond to this query:       a. Click New Note     b. Answer query within the yellow box.                c. Update the Problem List, if applicable.      If you have any questions about this query contact me at: [email or web address]     Dr. Serrano,     68yr male to ED with complaints of chest pain and shortness of breath. In ED, T-97, P-71, BP-81/67, WBC-7.03, Lactate-2.5, procal-12.17 with IV fluid bolus and IV Maxipime given. Progress notes  thru  reflects \"Continue vancomycin and Zosyn for pneumonia and sepsis, may de-escalate pending cultures\". Sputum culture rejected and blood cultures with no growth for 4 days.  Hospitalist reflects \"patient be admitted for acute hypoxic respiratory failure due to obstructive pneumonia and worsening malignancy. Patient continued to decline\" and \"Patient declined rapidly and  on the afternoon of 2024 due to acute hypoxic respiratory failure due to right upper lobe mass with bulky hilar and mediastinal adenopathy suspected to represent primary lung malignancy complicated by post obstructive pneumonia\" on discharge summary.    Please clarify the following:    Acute hypoxic respiratory failure due to sepsis ruled in  Sepsis without organ dysfunction ruled in   Sepsis ruled out   Other- specify______  Unable to determine      By submitting this query, we are merely seeking further clarification of documentation to accurately reflect all conditions that you are monitoring, evaluating, treating or that extend the hospitalization or utilize additional resources of care. Please utilize your independent clinical judgment when addressing the question(s) above.     This query and your " response, once completed, will be entered into the legal medical record.    Sincerely,  Nay MOODY Rn  Clinical Documentation Integrity Program

## 2024-03-05 LAB
MYCOBACTERIUM SPEC CULT: NORMAL
NIGHT BLUE STAIN TISS: NORMAL

## 2024-03-08 LAB — FUNGUS WND CULT: NORMAL

## 2024-03-12 LAB
MYCOBACTERIUM SPEC CULT: NORMAL
NIGHT BLUE STAIN TISS: NORMAL

## (undated) DEVICE — NC TREK NEO™ CORONARY DILATATION CATHETER 3.25 MM X 12 MM / RAPID-EXCHANGE: Brand: NC TREK NEO™

## (undated) DEVICE — GW WHOLEY HITORQ MOD/J .035 145CM

## (undated) DEVICE — CATH F6 ST JL 3.5 100CM: Brand: SUPERTORQUE

## (undated) DEVICE — TREK CORONARY DILATATION CATHETER 3.0 MM X 12 MM / RAPID-EXCHANGE: Brand: TREK

## (undated) DEVICE — RUNTHROUGH NS EXTRA FLOPPY PTCA GUIDEWIRE: Brand: RUNTHROUGH

## (undated) DEVICE — CATH F6 ST JL 4 100CM: Brand: SUPERTORQUE

## (undated) DEVICE — CATH LAB PACK: Brand: MEDLINE INDUSTRIES, INC.

## (undated) DEVICE — TREK CORONARY DILATATION CATHETER 2.50 MM X 12 MM / RAPID-EXCHANGE: Brand: TREK

## (undated) DEVICE — SI AVANTI+ 6F STD W/GW  NO OBT: Brand: AVANTI

## (undated) DEVICE — NC TREK NEO™ CORONARY DILATATION CATHETER 3.50 MM X 12 MM / RAPID-EXCHANGE: Brand: NC TREK NEO™

## (undated) DEVICE — CATH F6 ST JL 5 100CM: Brand: SUPERTORQUE

## (undated) DEVICE — CATH IMG IVUS EAGLE EYE PLATIN RX DIGITAL .014IN 5FR

## (undated) DEVICE — CATH F6 ST JR 4 100CM: Brand: SUPERTORQUE

## (undated) DEVICE — NC TREK NEO™ CORONARY DILATATION CATHETER 3.25 MM X 8 MM / RAPID-EXCHANGE: Brand: NC TREK NEO™

## (undated) DEVICE — 6F .070 XB 4 100CM: Brand: VISTA BRITE TIP